# Patient Record
Sex: FEMALE | Race: WHITE | NOT HISPANIC OR LATINO
[De-identification: names, ages, dates, MRNs, and addresses within clinical notes are randomized per-mention and may not be internally consistent; named-entity substitution may affect disease eponyms.]

---

## 2020-03-21 ENCOUNTER — TRANSCRIPTION ENCOUNTER (OUTPATIENT)
Age: 60
End: 2020-03-21

## 2022-04-07 ENCOUNTER — APPOINTMENT (OUTPATIENT)
Dept: PAIN MANAGEMENT | Facility: CLINIC | Age: 62
End: 2022-04-07
Payer: COMMERCIAL

## 2022-04-07 PROCEDURE — 20552 NJX 1/MLT TRIGGER POINT 1/2: CPT | Mod: 59

## 2022-04-07 PROCEDURE — J3490M: CUSTOM

## 2022-04-07 PROCEDURE — 64635 DESTROY LUMB/SAC FACET JNT: CPT

## 2022-04-07 NOTE — PROCEDURE
[FreeTextEntry3] : Date of Service: 04/07/2022 \par \par Account: [id number]\par \par Patient: NAIDA FAM \par \par YOB: 1960\par \par Age: 61 year\par \par \par Surgeon:  Crispin Acuna M.D.\par \par PREOPERATIVE DIAGNOSIS: Spondylosis of lumbar region without myelopathy or radiculopathy\par \par POSTOPERATIVE DIAGNOSIS: Spondylosis of lumbar region without myelopathy or radiculopathy \par \par PROCEDURE:\par \par      1) Left sided L2, L3, L4, L5 medial branch radiofrequency ablation under fluoroscopic guidance.  \par \par Anesthesia:                             MAC\par \par \par Risks, benefits and alternatives of the procedure were discussed with the patient after which she agreed to proceed.  Patient was brought into fluoroscopy suite and was placed in prone position with hip support. Back was prepped and draped in a sterile fashion x3. Anesthesia initiated. \par \par Under AP visualization, the left sacral ala was identified and marked. Using a 25 gauge ½ inch needle the skin and subcutaneous structures at this point were localized with 1% Lidocaine using approximately 3 cc's 1% Lidocaine.  After this, a 20 gauge 100mm Mina radiofrequency needle with a 10mm curved tip was inserted and using depth direction depth technique under constant fluoroscopic visualization, the needle was advanced to the sacral ala until os was contacted.  \par \par The camera was then redirected under AP view to visualize the L3,L4,L5 vertebra, the camera was left oblique to approximately 30 degrees to reveal good Emile dog anatomical view. The junction of the superior articulate process and transverse process at the L3,L4,L5 levels on the left were then identified and marked. Skin and subcutaneous structures were then anesthetized with approximately 3 cc's of 1% Lidocaine at each of these levels. After which a 20 gauge 100mm Mina radiofrequency needle with a 10mm curved tip then advanced until the junction at the SAP and transverse process was met.  The camera was then directed through the lateral view and under constant fluoroscopic visualization, the needle tips were then advanced and confirmed at the junction of the SAP and transverse process.  \par \par The stylette for the most cephalad needle at the L3 level was then removed and a Liliam radiofrequency probe was then placed inside the needle. After her pedis' were checked at approximately 300 ohm, 50 hertz sensory stimulation was performed.  Patient experienced concordant pain in his left low back at approximately 0.3 volts. The voltage was then increased to 1 volt. The patient reported increased left low back pain symptoms without any radiation below the left knee.  Stimulation was then changed to 2 hertz and increased slowly by .1 volt increments at approximately 0.5 volts, patient began to experience thumping like reproductive pain in his left low back. The voltage was then increased to approximately 2.5 volts. Patient experienced increasing thumping without any sensation below his left knee. This exact stimulation was then repeated for the L4 and L5 needles as well as sacral ala needle with concordant pain and no radiation below the left knee. The 3 levels were then anesthetized with approximately 0.5 cc's of 1% Lidocaine. After which each area was then ablated at 80 degrees centigrade for 60 seconds each. Patient felt no pain reproduction during the ablation procedure.  After each of these levels were ablated and injected approximately 1 cc of 0.25% Bupivacaine plus 10 mg of depo-medrol were then injected before the needles were removed.  Pressure was then applied to the low back. Band-aids were applied.  Patient was brought to the recovery, ambulated on his own after the procedure and reported decreased left sided low back pain. \par \par Anesthesia personnel were present throughout the procedure. Patient was told to apply ice to the low back for 20 minutes on and 20 minutes off for focal symptoms for 24-48 hours. He is to call the office if he had any questions or concerns.  \par \par \par Crispin Acuna M.D.\par

## 2022-04-25 ENCOUNTER — APPOINTMENT (OUTPATIENT)
Dept: PAIN MANAGEMENT | Facility: CLINIC | Age: 62
End: 2022-04-25
Payer: COMMERCIAL

## 2022-04-25 VITALS — BODY MASS INDEX: 30.65 KG/M2 | HEIGHT: 63 IN | WEIGHT: 173 LBS

## 2022-04-25 DIAGNOSIS — G89.29 OTHER CHRONIC PAIN: ICD-10-CM

## 2022-04-25 DIAGNOSIS — Z78.9 OTHER SPECIFIED HEALTH STATUS: ICD-10-CM

## 2022-04-25 PROCEDURE — J3490M: CUSTOM

## 2022-04-25 PROCEDURE — 20552 NJX 1/MLT TRIGGER POINT 1/2: CPT

## 2022-04-25 PROCEDURE — 99214 OFFICE O/P EST MOD 30 MIN: CPT | Mod: 25

## 2022-04-25 RX ORDER — ONDANSETRON 4 MG/1
4 TABLET, ORALLY DISINTEGRATING ORAL
Qty: 15 | Refills: 0 | Status: ACTIVE | COMMUNITY
Start: 2022-04-07

## 2022-04-25 RX ORDER — LEVOTHYROXINE SODIUM 0.1 MG/1
100 TABLET ORAL
Qty: 30 | Refills: 0 | Status: ACTIVE | COMMUNITY
Start: 2022-03-11

## 2022-04-25 NOTE — HISTORY OF PRESENT ILLNESS
[Lower back] : lower back [7] : 7 [Dull/Aching] : dull/aching [Radiating] : radiating [Constant] : constant [Injection therapy] : injection therapy [Full time] : Work status: full time [] : no [de-identified] : lifting

## 2022-04-25 NOTE — PHYSICAL EXAM
[Able to Communicate] : able to communicate [Well Developed] : well developed [Well Nourished] : well nourished [No Respiratory Distress] : no respiratory distress [] : positive Dickey maneuver facet loading

## 2022-04-25 NOTE — PROCEDURE
[Trigger point 1-2 muscle groups] : trigger point 1-2 muscle groups [Left] : of the left [Lumbar paraspinal muscle] : lumbar paraspinal muscle [Pain] : pain [Inflammation] : inflammation [Alcohol] : alcohol [Ethyl Chloride sprayed topically] : ethyl chloride sprayed topically [Sterile technique used] : sterile technique used [___ cc    1%] : Lidocaine ~Vcc of 1%  [___ cc    0.25%] : Bupivacaine (Marcaine) ~Vcc of 0.25%  [___ cc    10mg] : Triamcinolone (Kenalog) ~Vcc of 10 mg

## 2022-04-25 NOTE — ASSESSMENT
[FreeTextEntry1] : L LS RFA with 80% relief at this time, will re-assess \par will do TPI today \par  \par \par Trigger Point was performed because of pain inflammation Anesthesia: ethyl chloride sprayed topically.: Lidocaine .1% 2 cc Marcaine:.25% 2cc  kenalog 10mg/cc 2cc :Needle size: 25 gauge 1 1/2inch.  Medication was injected in the left Lumbar paraspinal muscles . Patient has tried OTC's including aspirin, Ibuprofen, Aleve etc or prescription NSAIDS, and/or exercises at home and/ or physical therapy without satisfactory response After verbal consent using sterile preparation and technique.The risks, benefits, and alternatives to cortisone injection were explained in full to the patient. Risks outlined include but are not limited to infection, sepsis, bleeding, scarring, skin discoloration, temporary increase in pain, syncopal episode, failure to resolve symptoms, allergic reaction, symptom recurrence, and elevation of blood sugar in diabetics. Patient understood the risks. All questions were answered. After discussion of options, patient requested an injection. Oral informed consent was obtained and sterile prep was done of the injection site. Sterile technique was utilized for the procedure including the preparation of the solutions used for the injection. Patient tolerated the procedure well. Advised to ice the injection site this evening.  Sterile technique used Prep with alcohol locally to site.\par \par

## 2022-04-25 NOTE — REASON FOR VISIT
[FreeTextEntry2] : f/u to injection \par Procedure: Left sided L2,L3,L4,L5 medical branch radiofrequency ablation under fluoroscopic guidance \par Anethesia: MAC ( DOS: 04/07/2022 )

## 2022-05-04 ENCOUNTER — RX RENEWAL (OUTPATIENT)
Age: 62
End: 2022-05-04

## 2022-06-06 ENCOUNTER — APPOINTMENT (OUTPATIENT)
Dept: PAIN MANAGEMENT | Facility: CLINIC | Age: 62
End: 2022-06-06

## 2022-06-08 ENCOUNTER — RX RENEWAL (OUTPATIENT)
Age: 62
End: 2022-06-08

## 2022-06-08 RX ORDER — DICLOFENAC SODIUM 50 MG/1
50 TABLET, DELAYED RELEASE ORAL
Qty: 60 | Refills: 0 | Status: ACTIVE | COMMUNITY
Start: 2022-05-04 | End: 1900-01-01

## 2022-07-25 ENCOUNTER — APPOINTMENT (OUTPATIENT)
Dept: PAIN MANAGEMENT | Facility: CLINIC | Age: 62
End: 2022-07-25

## 2022-07-26 ENCOUNTER — APPOINTMENT (OUTPATIENT)
Dept: ORTHOPEDIC SURGERY | Facility: CLINIC | Age: 62
End: 2022-07-26
Payer: COMMERCIAL

## 2022-07-26 VITALS — HEIGHT: 63 IN | BODY MASS INDEX: 29.23 KG/M2 | WEIGHT: 165 LBS

## 2022-07-26 DIAGNOSIS — S90.31XA CONTUSION OF RIGHT FOOT, INITIAL ENCOUNTER: ICD-10-CM

## 2022-07-26 DIAGNOSIS — S82.62XA DISPLACED FRACTURE OF LATERAL MALLEOLUS OF LEFT FIBULA, INITIAL ENCOUNTER FOR CLOSED FRACTURE: ICD-10-CM

## 2022-07-26 PROCEDURE — 73660 X-RAY EXAM OF TOE(S): CPT | Mod: RT

## 2022-07-26 PROCEDURE — 99214 OFFICE O/P EST MOD 30 MIN: CPT | Mod: 25

## 2022-07-26 PROCEDURE — L4361: CPT

## 2022-07-26 PROCEDURE — 99072 ADDL SUPL MATRL&STAF TM PHE: CPT

## 2022-07-26 PROCEDURE — 73610 X-RAY EXAM OF ANKLE: CPT | Mod: LT

## 2022-07-26 PROCEDURE — 73590 X-RAY EXAM OF LOWER LEG: CPT | Mod: LT

## 2022-07-26 PROCEDURE — 27786 TREATMENT OF ANKLE FRACTURE: CPT | Mod: LT

## 2022-07-26 NOTE — ASSESSMENT
[FreeTextEntry1] : wbat\par cam boot LLE\par ice/elevate\par nsaids prn\par f/up 3 wks w/ L ankle xray\par \par f/up elbow specialist for elbow injury

## 2022-07-26 NOTE — HISTORY OF PRESENT ILLNESS
[7] : 7 [Sharp] : sharp [de-identified] : 07/26/2022: fell on stairs 2 days ago w/ L ankle pain. went to ed and placed in splint. no prior ankle probs. also c/o R toe pain after running over w/ wheelchair. denies dm/tob.  [] : no [FreeTextEntry1] : Lt ankle  [FreeTextEntry3] : 7/24/22

## 2022-07-26 NOTE — PHYSICAL EXAM
[Mild] : mild swelling of toe(s) [5th] : 5th [5___] : eversion 5[unfilled]/5 [2+] : dorsalis pedis pulse: 2+ [] : Sensation present to light touch in all distributions [Left] : left tibia/fibula [Right] : right toe [There are no fractures, subluxations or dislocations. No significant abnormalities are seen] : There are no fractures, subluxations or dislocations. No significant abnormalities are seen [FreeTextEntry9] : lat mall avulsion fx [de-identified] : lat mall avulsion fx [de-identified] : plantar flexion 20 degrees [TWNoteComboBox7] : dorsiflexion 5 degrees

## 2022-07-26 NOTE — DATA REVIEWED
[CT Scan] : CT scan [Left] : left [Ankle] : ankle [FreeTextEntry1] : lat mall avulsion fx - [facility]--no images available

## 2022-08-01 ENCOUNTER — APPOINTMENT (OUTPATIENT)
Dept: PAIN MANAGEMENT | Facility: CLINIC | Age: 62
End: 2022-08-01
Payer: COMMERCIAL

## 2022-08-01 VITALS — WEIGHT: 165 LBS | HEIGHT: 63 IN | BODY MASS INDEX: 29.23 KG/M2

## 2022-08-01 PROCEDURE — 99072 ADDL SUPL MATRL&STAF TM PHE: CPT

## 2022-08-01 PROCEDURE — J3490M: CUSTOM

## 2022-08-01 PROCEDURE — 20552 NJX 1/MLT TRIGGER POINT 1/2: CPT

## 2022-08-01 PROCEDURE — 99214 OFFICE O/P EST MOD 30 MIN: CPT | Mod: 25

## 2022-08-01 NOTE — ASSESSMENT
[FreeTextEntry1] : L LS RFA sustained relief \par will do TPI today \par  \par \par Trigger Point was performed because of pain inflammation Anesthesia: ethyl chloride sprayed topically.: Lidocaine .1% 2 cc Marcaine:.25% 2cc  kenalog 10mg/cc 2cc :Needle size: 25 gauge 1 1/2inch.  Medication was injected in the left Lumbar paraspinal muscles . Patient has tried OTC's including aspirin, Ibuprofen, Aleve etc or prescription NSAIDS, and/or exercises at home and/ or physical therapy without satisfactory response After verbal consent using sterile preparation and technique.The risks, benefits, and alternatives to cortisone injection were explained in full to the patient. Risks outlined include but are not limited to infection, sepsis, bleeding, scarring, skin discoloration, temporary increase in pain, syncopal episode, failure to resolve symptoms, allergic reaction, symptom recurrence, and elevation of blood sugar in diabetics. Patient understood the risks. All questions were answered. After discussion of options, patient requested an injection. Oral informed consent was obtained and sterile prep was done of the injection site. Sterile technique was utilized for the procedure including the preparation of the solutions used for the injection. Patient tolerated the procedure well. Advised to ice the injection site this evening.  Sterile technique used Prep with alcohol locally to site.\par \par

## 2022-08-01 NOTE — HISTORY OF PRESENT ILLNESS
[Sharp] : sharp [Shooting] : shooting [Stabbing] : stabbing [Household chores] : household chores [Leisure] : leisure [Sleep] : sleep [Meds] : meds [Sitting] : sitting [Nothing helps with pain getting better] : Nothing helps with pain getting better [Standing] : standing [Walking] : walking [Stairs] : stairs [Lower back] : lower back [7] : 7 [Dull/Aching] : dull/aching [Radiating] : radiating [Constant] : constant [Injection therapy] : injection therapy [Full time] : Work status: full time [] : no [FreeTextEntry7] : Left side [de-identified] : lifting

## 2022-08-08 ENCOUNTER — FORM ENCOUNTER (OUTPATIENT)
Age: 62
End: 2022-08-08

## 2022-08-09 ENCOUNTER — APPOINTMENT (OUTPATIENT)
Dept: ORTHOPEDIC SURGERY | Facility: CLINIC | Age: 62
End: 2022-08-09

## 2022-08-09 ENCOUNTER — APPOINTMENT (OUTPATIENT)
Dept: MRI IMAGING | Facility: CLINIC | Age: 62
End: 2022-08-09

## 2022-08-09 VITALS — WEIGHT: 165 LBS | BODY MASS INDEX: 29.23 KG/M2 | HEIGHT: 63 IN

## 2022-08-09 DIAGNOSIS — M25.421 EFFUSION, RIGHT ELBOW: ICD-10-CM

## 2022-08-09 DIAGNOSIS — S50.01XA CONTUSION OF RIGHT ELBOW, INITIAL ENCOUNTER: ICD-10-CM

## 2022-08-09 PROCEDURE — 99072 ADDL SUPL MATRL&STAF TM PHE: CPT

## 2022-08-09 PROCEDURE — 73090 X-RAY EXAM OF FOREARM: CPT | Mod: RT

## 2022-08-09 PROCEDURE — 99244 OFF/OP CNSLTJ NEW/EST MOD 40: CPT | Mod: 24

## 2022-08-09 PROCEDURE — 73221 MRI JOINT UPR EXTREM W/O DYE: CPT | Mod: RT

## 2022-08-09 PROCEDURE — 73080 X-RAY EXAM OF ELBOW: CPT | Mod: RT

## 2022-08-09 NOTE — ASSESSMENT
[FreeTextEntry1] : Right elbow effusion, concern for fx or tear.\par MRI to eval.\par Ice/heat.\par RTO for review.

## 2022-08-09 NOTE — IMAGING
[Right] : right forearm [No acute displaced fracture or dislocation] : No acute displaced fracture or dislocation

## 2022-08-09 NOTE — HISTORY OF PRESENT ILLNESS
[8] : 8 [3] : 3 [Radiating] : radiating [Tingling] : tingling [Occasional] : occasional [Household chores] : household chores [Sleep] : sleep [Nothing helps with pain getting better] : Nothing helps with pain getting better [de-identified] : 61 yo RHD Female here as consult from [place] Right elbow/ forearm pain since 7/24/22, when she fell onto elbow after missing a step. Did not pop out. Was seen [facility] ED - took elbow CT which showed osseus fragment adjacent to right cornoid process. Pain has not improved since injury. Notes limited ROM and pain with lifting/pulling/ pushing. Denies N/T.  [] : no [FreeTextEntry1] : right elbow  [FreeTextEntry3] : 7/21/22 [FreeTextEntry5] : patient was injured when she fell outside in her sons backyard. She landed on her elbow  [FreeTextEntry7] : down the forearm  [de-identified] : ct

## 2022-08-15 ENCOUNTER — APPOINTMENT (OUTPATIENT)
Dept: ORTHOPEDIC SURGERY | Facility: CLINIC | Age: 62
End: 2022-08-15

## 2022-08-15 DIAGNOSIS — S52.124D NONDISPLACED FRACTURE OF HEAD OF RIGHT RADIUS, SUBSEQUENT ENCOUNTER FOR CLOSED FRACTURE WITH ROUTINE HEALING: ICD-10-CM

## 2022-08-15 PROCEDURE — 73080 X-RAY EXAM OF ELBOW: CPT | Mod: RT

## 2022-08-15 PROCEDURE — 24650 CLTX RDL HEAD/NCK FX WO MNPJ: CPT | Mod: 79,RT

## 2022-08-15 PROCEDURE — 99072 ADDL SUPL MATRL&STAF TM PHE: CPT

## 2022-08-15 PROCEDURE — 99214 OFFICE O/P EST MOD 30 MIN: CPT | Mod: 24,57

## 2022-08-15 NOTE — ASSESSMENT
[FreeTextEntry1] : MRI reviewed- non displaced radial head fracture.\par Now 2 weeks.\par D/c sling.\par Pt for PROM, AROM.\par Ok for light ADLs.\par RTO 4 weeks with xrays.

## 2022-08-15 NOTE — HISTORY OF PRESENT ILLNESS
[8] : 8 [3] : 3 [Radiating] : radiating [Tingling] : tingling [Occasional] : occasional [Household chores] : household chores [Sleep] : sleep [Nothing helps with pain getting better] : Nothing helps with pain getting better [de-identified] : 8/15/22- F/U R Elbow, presents for MRI results. \par \par MRI R elbow: \par 1. Nondisplaced fracture of the radial head with fracture lines extending into the neck and proximal diaphysis with moderate bone edema. The radial articular surface appears intact.\par 2. Mild bone contusion of the capitellum.\par 3. Mild strain of the common flexor tendon.\par 4. Mild sprain of the ulnar collateral ligament.\par 5. Moderate-sized joint effusion.\par \par 8/9/22- 61 yo RHD Female here as consult from [place] Right elbow/ forearm pain since 7/24/22, when she fell onto elbow after missing a step. Did not pop out. Was seen [facility] ED - took elbow CT which showed osseus fragment adjacent to right cornoid process. Pain has not improved since injury. Notes limited ROM and pain with lifting/pulling/pushing. Denies N/T.  [] : no [FreeTextEntry1] : right elbow  [FreeTextEntry3] : 7/21/22 [FreeTextEntry5] : patient was injured when she fell outside in her sons backyard. She landed on her elbow  [FreeTextEntry7] : down the forearm  [de-identified] : ct

## 2022-08-15 NOTE — IMAGING
[Right] : right forearm [The fracture is in acceptable alignment. There is progression in healing seen] : The fracture is in acceptable alignment. There is progression in healing seen

## 2022-08-15 NOTE — PHYSICAL EXAM
[Right] : right elbow [Pain with Extension] : pain with extension [] : no tenderness over olecranon [FreeTextEntry9] : Extension 10 deg [de-identified] : Pain with strength testing  [TWNoteComboBox7] : flexion 110 degrees

## 2022-08-16 ENCOUNTER — APPOINTMENT (OUTPATIENT)
Dept: ORTHOPEDIC SURGERY | Facility: CLINIC | Age: 62
End: 2022-08-16

## 2022-08-16 VITALS — HEIGHT: 63 IN | BODY MASS INDEX: 29.23 KG/M2 | WEIGHT: 165 LBS

## 2022-08-16 PROCEDURE — 73610 X-RAY EXAM OF ANKLE: CPT | Mod: LT

## 2022-08-16 PROCEDURE — L4350: CPT

## 2022-08-16 PROCEDURE — 99024 POSTOP FOLLOW-UP VISIT: CPT

## 2022-08-16 NOTE — ASSESSMENT
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

## 2022-08-16 NOTE — HISTORY OF PRESENT ILLNESS
[3] : 3 [Dull/Aching] : dull/aching [de-identified] : 07/26/2022: fell on stairs 2 days ago w/ L ankle pain. went to ed and placed in splint. no prior ankle probs. also c/o R toe pain after running over w/ wheelchair. denies dm/tob. \par \par 08/16/2022: Pain improving. WB in boot.  [] : no [FreeTextEntry1] : Lt ankle  [FreeTextEntry3] : 7/24/22 [de-identified] : boot  [de-identified] : Arina

## 2022-08-16 NOTE — IMAGING
[Left] : left ankle [The fracture is in acceptable alignment. There is progression in healing seen] : The fracture is in acceptable alignment. There is progression in healing seen

## 2022-08-16 NOTE — PHYSICAL EXAM
[Mild] : mild swelling of toe(s) [5th] : 5th [2+] : dorsalis pedis pulse: 2+ [Left] : left tibia/fibula [Right] : right toe [There are no fractures, subluxations or dislocations. No significant abnormalities are seen] : There are no fractures, subluxations or dislocations. No significant abnormalities are seen [5___] : plantar flexion 5[unfilled]/5 [] : negative anterior drawer at ankle [FreeTextEntry8] : improved [FreeTextEntry9] : lat mall avulsion fx [de-identified] : lat mall avulsion fx [de-identified] : plantar flexion 30 degrees [TWNoteComboBox7] : dorsiflexion 10 degrees

## 2022-08-29 ENCOUNTER — APPOINTMENT (OUTPATIENT)
Dept: PAIN MANAGEMENT | Facility: CLINIC | Age: 62
End: 2022-08-29
Payer: COMMERCIAL

## 2022-08-29 VITALS — HEIGHT: 63 IN | BODY MASS INDEX: 29.23 KG/M2 | WEIGHT: 165 LBS

## 2022-08-29 PROCEDURE — 20552 NJX 1/MLT TRIGGER POINT 1/2: CPT

## 2022-08-29 PROCEDURE — J3490M: CUSTOM

## 2022-08-29 PROCEDURE — 99072 ADDL SUPL MATRL&STAF TM PHE: CPT

## 2022-08-29 PROCEDURE — 99214 OFFICE O/P EST MOD 30 MIN: CPT | Mod: 25

## 2022-08-29 NOTE — HISTORY OF PRESENT ILLNESS
[Bending forward] : bending forward [Lower back] : lower back [7] : 7 [Dull/Aching] : dull/aching [Radiating] : radiating [Sharp] : sharp [Shooting] : shooting [Stabbing] : stabbing [Constant] : constant [Household chores] : household chores [Leisure] : leisure [Sleep] : sleep [Meds] : meds [Injection therapy] : injection therapy [Nothing helps with pain getting better] : Nothing helps with pain getting better [Sitting] : sitting [Standing] : standing [Walking] : walking [Stairs] : stairs [Full time] : Work status: full time [] : no [FreeTextEntry7] : Left side [de-identified] : lifting

## 2022-08-29 NOTE — ASSESSMENT
[FreeTextEntry1] : L LS RFA  WITH 75-80% RELIEF PP IMPROVED ROM AND ADLS, WILL REPEAT \par will do TPI today \par  \par \par Trigger Point was performed because of pain inflammation Anesthesia: ethyl chloride sprayed topically.: Lidocaine .1% 2 cc Marcaine:.25% 2cc  kenalog 10mg/cc 2cc :Needle size: 25 gauge 1 1/2inch.  Medication was injected in the left Lumbar paraspinal muscles . Patient has tried OTC's including aspirin, Ibuprofen, Aleve etc or prescription NSAIDS, and/or exercises at home and/ or physical therapy without satisfactory response After verbal consent using sterile preparation and technique.The risks, benefits, and alternatives to cortisone injection were explained in full to the patient. Risks outlined include but are not limited to infection, sepsis, bleeding, scarring, skin discoloration, temporary increase in pain, syncopal episode, failure to resolve symptoms, allergic reaction, symptom recurrence, and elevation of blood sugar in diabetics. Patient understood the risks. All questions were answered. After discussion of options, patient requested an injection. Oral informed consent was obtained and sterile prep was done of the injection site. Sterile technique was utilized for the procedure including the preparation of the solutions used for the injection. Patient tolerated the procedure well. Advised to ice the injection site this evening.  Sterile technique used Prep with alcohol locally to site.\par \par

## 2022-09-20 ENCOUNTER — APPOINTMENT (OUTPATIENT)
Dept: ORTHOPEDIC SURGERY | Facility: CLINIC | Age: 62
End: 2022-09-20

## 2022-09-20 VITALS — WEIGHT: 165 LBS | BODY MASS INDEX: 29.23 KG/M2 | HEIGHT: 63 IN

## 2022-09-20 DIAGNOSIS — S82.832D OTHER FRACTURE OF UPPER AND LOWER END OF LEFT FIBULA, SUBSEQUENT ENCOUNTER FOR CLOSED FRACTURE WITH ROUTINE HEALING: ICD-10-CM

## 2022-09-20 PROCEDURE — 73610 X-RAY EXAM OF ANKLE: CPT | Mod: LT

## 2022-09-20 PROCEDURE — 99024 POSTOP FOLLOW-UP VISIT: CPT

## 2022-09-20 NOTE — PHYSICAL EXAM
[5___] : [facility] 5[unfilled]/5 [2+] : dorsalis pedis pulse: 2+ [5th] : 5th [Left] : left ankle [] : no lisfranc's joint tenderness [The fracture is in acceptable alignment. There is progression in healing seen] : The fracture is in acceptable alignment. There is progression in healing seen [FreeTextEntry8] : improved [de-identified] : plantar flexion 30 degrees [TWNoteComboBox7] : dorsiflexion 10 degrees

## 2022-09-20 NOTE — HISTORY OF PRESENT ILLNESS
[3] : 3 [Dull/Aching] : dull/aching [de-identified] : 07/26/2022: fell on stairs 2 days ago w/ L ankle pain. went to ed and placed in splint. no prior ankle probs. also c/o R toe pain after running over w/ wheelchair. denies dm/tob. \par \par 08/16/2022: Pain improving. WB in boot. \par \par 09/20/2022: pain improving. walking in regular shoes w/ brace. stopped PT due to cost - doing HEP. using brace [] : Post Surgical Visit: no [FreeTextEntry1] : Lt ankle  [FreeTextEntry3] : 7/24/22 [de-identified] : boot  [de-identified] : [facility]  [de-identified] : xray

## 2022-09-27 ENCOUNTER — APPOINTMENT (OUTPATIENT)
Dept: ORTHOPEDIC SURGERY | Facility: CLINIC | Age: 62
End: 2022-09-27

## 2022-10-12 ENCOUNTER — APPOINTMENT (OUTPATIENT)
Dept: PAIN MANAGEMENT | Facility: CLINIC | Age: 62
End: 2022-10-12

## 2022-10-12 PROCEDURE — 64636Z: CUSTOM | Mod: 50

## 2022-10-12 PROCEDURE — J3490M: CUSTOM

## 2022-10-12 PROCEDURE — 99072 ADDL SUPL MATRL&STAF TM PHE: CPT

## 2022-10-12 PROCEDURE — 64635 DESTROY LUMB/SAC FACET JNT: CPT | Mod: LT

## 2022-10-17 NOTE — PROCEDURE
[FreeTextEntry3] : Date of Service: 10/12/2022 \par \par Account: [id number]\par \par Patient: NAIDA FAM \par \par YOB: 1960\par \par Age: 62 year\par \par \par Surgeon:  Crispin Acuna M.D.\par \par PREOPERATIVE DIAGNOSIS: Spondylosis of lumbar region without myelopathy or radiculopathy\par \par POSTOPERATIVE DIAGNOSIS: Spondylosis of lumbar region without myelopathy or radiculopathy \par \par PROCEDURE:\par \par      1) Left sided L2, L3, L4, L5 medial branch radiofrequency ablation under fluoroscopic guidance.  \par \par Anesthesia:                             MAC\par \par \par Risks, benefits and alternatives of the procedure were discussed with the patient after which she agreed to proceed.  Patient was brought into fluoroscopy suite and was placed in prone position with hip support. Back was prepped and draped in a sterile fashion x3. Anesthesia initiated. \par \par Under AP visualization, the left sacral ala was identified and marked. Using a 25 gauge ½ inch needle the skin and subcutaneous structures at this point were localized with 1% Lidocaine using approximately 3 cc's 1% Lidocaine.  After this, a 20 gauge 100mm [place] radiofrequency needle with a 10mm curved tip was inserted and using depth direction depth technique under constant fluoroscopic visualization, the needle was advanced to the sacral ala until os was contacted.  \par \par The camera was then redirected under AP view to visualize the L3,L4,L5 vertebra, the camera was left oblique to approximately 30 degrees to reveal good Emile dog anatomical view. The junction of the superior articulate process and transverse process at the L3,L4,L5 levels on the left were then identified and marked. Skin and subcutaneous structures were then anesthetized with approximately 3 cc's of 1% Lidocaine at each of these levels. After which a 20 gauge 100mm [place] radiofrequency needle with a 10mm curved tip then advanced until the junction at the SAP and transverse process was met.  The camera was then directed through the lateral view and under constant fluoroscopic visualization, the needle tips were then advanced and confirmed at the junction of the SAP and transverse process.  \par \par The stylette for the most cephalad needle at the L3 level was then removed and a Liliam radiofrequency probe was then placed inside the needle. After her pedis' were checked at approximately 300 ohm, 50 hertz sensory stimulation was performed.  Patient experienced concordant pain in his left low back at approximately 0.3 volts. The voltage was then increased to 1 volt. The patient reported increased left low back pain symptoms without any radiation below the left knee.  Stimulation was then changed to 2 hertz and increased slowly by .1 volt increments at approximately 0.5 volts, patient began to experience thumping like reproductive pain in his left low back. The voltage was then increased to approximately 2.5 volts. Patient experienced increasing thumping without any sensation below his left knee. This exact stimulation was then repeated for the L4 and L5 needles as well as sacral ala needle with concordant pain and no radiation below the left knee. The 3 levels were then anesthetized with approximately 0.5 cc's of 1% Lidocaine. After which each area was then ablated at 80 degrees centigrade for 60 seconds each. Patient felt no pain reproduction during the ablation procedure.  After each of these levels were ablated and injected approximately 1 cc of 0.25% Bupivacaine plus 10 mg of depo-medrol were then injected before the needles were removed.  Pressure was then applied to the low back. Band-aids were applied.  Patient was brought to the recovery, ambulated on his own after the procedure and reported decreased left sided low back pain. \par \par Anesthesia personnel were present throughout the procedure. Patient was told to apply ice to the low back for 20 minutes on and 20 minutes off for focal symptoms for 24-48 hours. He is to call the office if he had any questions or concerns.  \par \par \par Crispin Acuna M.D.\par

## 2022-11-07 ENCOUNTER — APPOINTMENT (OUTPATIENT)
Dept: PAIN MANAGEMENT | Facility: CLINIC | Age: 62
End: 2022-11-07

## 2022-11-07 VITALS — WEIGHT: 165 LBS | HEIGHT: 63 IN | BODY MASS INDEX: 29.23 KG/M2

## 2022-11-07 DIAGNOSIS — M79.10 MYALGIA, UNSPECIFIED SITE: ICD-10-CM

## 2022-11-07 PROCEDURE — 99072 ADDL SUPL MATRL&STAF TM PHE: CPT

## 2022-11-07 PROCEDURE — 99214 OFFICE O/P EST MOD 30 MIN: CPT | Mod: 25

## 2022-11-07 NOTE — ASSESSMENT
[FreeTextEntry1] : L LS RFA with >90% relief x 3 weeks, pain has returned with activity\par will do TPI today \par consider repeat MBB

## 2022-11-07 NOTE — PROCEDURE
[Trigger point 1-2 muscle groups] : trigger point 1-2 muscle groups [Left] : of the left [Lumbar paraspinal muscle] : lumbar paraspinal muscle [Pain] : pain [Inflammation] : inflammation [Alcohol] : alcohol [___ cc    1%] : Lidocaine ~Vcc of 1%  [___ cc    0.25%] : Bupivacaine (Marcaine) ~Vcc of 0.25%  [___ cc    10mg] : Triamcinolone (Kenalog) ~Vcc of 10 mg  [] : Patient tolerated procedure well [Call if redness, pain or fever occur] : call if redness, pain or fever occur [Risks, benefits, alternatives discussed / Verbal consent obtained] : the risks benefits, and alternatives have been discussed, and verbal consent was obtained

## 2022-11-07 NOTE — REASON FOR VISIT
[FreeTextEntry2] : f/u to injection \par Procedure: Left sided L2,L3,L4,L4 medial branch radiofrequency ablation under fluoroscopic guidance\par Anesthesia:MAC(DOS:10/12/2022)

## 2022-11-07 NOTE — HISTORY OF PRESENT ILLNESS
[Lower back] : lower back [7] : 7 [3] : 3 [Radiating] : radiating [] : yes [Constant] : constant [Household chores] : household chores [Rest] : rest [Standing] : standing [de-identified] : pt is following up after l spine rfa

## 2023-01-09 ENCOUNTER — APPOINTMENT (OUTPATIENT)
Dept: PAIN MANAGEMENT | Facility: CLINIC | Age: 63
End: 2023-01-09
Payer: COMMERCIAL

## 2023-01-09 VITALS — HEIGHT: 63 IN | WEIGHT: 165 LBS | BODY MASS INDEX: 29.23 KG/M2

## 2023-01-09 PROCEDURE — 99213 OFFICE O/P EST LOW 20 MIN: CPT

## 2023-01-09 PROCEDURE — 99072 ADDL SUPL MATRL&STAF TM PHE: CPT

## 2023-01-09 NOTE — DISCUSSION/SUMMARY
[de-identified] : doing better this month 85% relief with improved rom and adls\par still with some pain with standing in place and sleeping\par \par will consider repeat MBB prn and eligible for RFA after 4/12\par \par will hold off injections today

## 2023-01-09 NOTE — HISTORY OF PRESENT ILLNESS
[Lower back] : lower back [3] : 3 [Dull/Aching] : dull/aching [] : yes [Intermittent] : intermittent [Household chores] : household chores [Leisure] : leisure [Sleep] : sleep [Meds] : meds [Standing] : standing [Walking] : walking [FreeTextEntry7] : b/l legs

## 2023-01-09 NOTE — PHYSICAL EXAM
[de-identified] : PHYSICAL EXAM\par \par Constitutional: \par Appears well, no apparent distress\par Ability to communicate: Normal\par Respiratory: non-labored breathing\par Skin: no rash noted\par Head: normocephalic, atraumatic\par Neck: no visible thyroid enlargement\par Eyes: extraocular movements intact\par Neurologic: alert and oriented x3\par Psychiatric: normal mood, affect, and behavior\par \par Lumbar Spine: \par Palpation: left and right lumbar paraspinal spasm and left and right lumbar paraspinal tenderness to palpation.\par ROM: Diminished range of motion in all plains.  Patient notes pain with lateral bending to the left and right.\par MMT: Motor exam is 5/5 through out bilateral lower extremities.\par Sensation: Light touch and pain is intact throughout bilateral lower extremities.\par Reflexes: achilles and patella reflexes are intact and  symmetrical.  No sustained clonus.\par Special Testing: Positive kemps maneuver on the left and right side\par \par Assessemnt:\par Lumbar spondylosis (m47.816)\par Myalgia (M79.10)\par \par

## 2023-02-13 ENCOUNTER — APPOINTMENT (OUTPATIENT)
Dept: PAIN MANAGEMENT | Facility: CLINIC | Age: 63
End: 2023-02-13
Payer: COMMERCIAL

## 2023-02-13 VITALS — WEIGHT: 165 LBS | HEIGHT: 63 IN | BODY MASS INDEX: 29.23 KG/M2

## 2023-02-13 PROCEDURE — 99214 OFFICE O/P EST MOD 30 MIN: CPT | Mod: 25

## 2023-02-13 PROCEDURE — 20552 NJX 1/MLT TRIGGER POINT 1/2: CPT

## 2023-02-13 PROCEDURE — J3490M: CUSTOM

## 2023-02-13 PROCEDURE — 99072 ADDL SUPL MATRL&STAF TM PHE: CPT

## 2023-02-13 NOTE — HISTORY OF PRESENT ILLNESS
[Lower back] : lower back [7] : 7 [4] : 4 [Dull/Aching] : dull/aching [Radiating] : radiating [Constant] : constant [Household chores] : household chores [Leisure] : leisure [Social interactions] : social interactions [Injection therapy] : injection therapy [de-identified] : pt is following up for l spine pain , the pain goes into the left buttocks  [] : no [FreeTextEntry7] : left buttocks  [de-identified] : lifting

## 2023-02-13 NOTE — ASSESSMENT
[FreeTextEntry1] : L LS RFA 10/12/22 with >70% relief sustained until few weeks ago\par unable to repeat RFA until after 4/12, will do TPI today

## 2023-02-13 NOTE — PHYSICAL EXAM
[de-identified] : PHYSICAL EXAM\par \par Constitutional: \par Appears well, no apparent distress\par Ability to communicate: Normal\par Respiratory: non-labored breathing\par Skin: no rash noted\par Head: normocephalic, atraumatic\par Neck: no visible thyroid enlargement\par Eyes: extraocular movements intact\par Neurologic: alert and oriented x3\par Psychiatric: normal mood, affect, and behavior\par \par Lumbar Spine: \par Palpation: left and right lumbar paraspinal spasm and left and right lumbar paraspinal tenderness to palpation.\par ROM: Diminished range of motion in all plains.  Patient notes pain with lateral bending to the left and right.\par MMT: Motor exam is 5/5 through out bilateral lower extremities.\par Sensation: Light touch and pain is intact throughout bilateral lower extremities.\par Reflexes: achilles and patella reflexes are intact and  symmetrical.  No sustained clonus.\par Special Testing: Positive kemps maneuver on the left and right side\par \par Assessemnt:\par Lumbar spondylosis (m47.816)\par Myalgia (M79.10)\par \par

## 2023-02-13 NOTE — PROCEDURE
[Trigger point 1-2 muscle groups] : trigger point 1-2 muscle groups [Left] : of the left [Lumbar paraspinal muscle] : lumbar paraspinal muscle [Pain] : pain [Inflammation] : inflammation [Alcohol] : alcohol [Ethyl Chloride sprayed topically] : ethyl chloride sprayed topically [Sterile technique used] : sterile technique used [___ cc    1%] : Lidocaine ~Vcc of 1%  [___ cc    0.25%] : Bupivacaine (Marcaine) ~Vcc of 0.25%  [___ cc    10mg] : Triamcinolone (Kenalog) ~Vcc of 10 mg  [] : Patient tolerated procedure well [Call if redness, pain or fever occur] : call if redness, pain or fever occur [Risks, benefits, alternatives discussed / Verbal consent obtained] : the risks benefits, and alternatives have been discussed, and verbal consent was obtained

## 2023-03-06 ENCOUNTER — APPOINTMENT (OUTPATIENT)
Dept: ORTHOPEDIC SURGERY | Facility: CLINIC | Age: 63
End: 2023-03-06
Payer: COMMERCIAL

## 2023-03-06 ENCOUNTER — NON-APPOINTMENT (OUTPATIENT)
Age: 63
End: 2023-03-06

## 2023-03-06 VITALS — WEIGHT: 165 LBS | HEIGHT: 63 IN | BODY MASS INDEX: 29.23 KG/M2

## 2023-03-06 DIAGNOSIS — S43.422A SPRAIN OF LEFT ROTATOR CUFF CAPSULE, INITIAL ENCOUNTER: ICD-10-CM

## 2023-03-06 PROCEDURE — 99072 ADDL SUPL MATRL&STAF TM PHE: CPT

## 2023-03-06 PROCEDURE — L1833: CPT | Mod: RT

## 2023-03-06 PROCEDURE — 99213 OFFICE O/P EST LOW 20 MIN: CPT

## 2023-03-06 NOTE — HISTORY OF PRESENT ILLNESS
[Sudden] : sudden [9] : 9 [8] : 8 [Localized] : localized [Rest] : rest [Walking] : walking [] : yes [de-identified] : 03/06/2023 pt presents here today with left shoulder/right knee pain since friday 03/03/2023 after tripped and fell ,pt went to [facility] md next day \par h/o left shoulder rct repair  [FreeTextEntry1] : right knee,left shoulder  [FreeTextEntry5] : fell 03/03/2023 [de-identified] : with activity  [de-identified] : x rays done at [facility] [de-identified] : brace

## 2023-03-06 NOTE — ASSESSMENT
[FreeTextEntry1] : TRAUMATIC LEFT SHOULDER AND RIGHT KNEE PAIN AFTER FALL 3 DAYS AGO\par H/O LEFT SHOULDER RCR 2018, VERY GUARDED ON EXAM WITH DIFFUSE PAIN\par RIGHT KNEE WITH MECHANICAL SYMPTOMS AND GUARDING ON EXAM\par OUTSIDE XRAYS NEGATIVE\par MRI LEFT SHOULDER AND RIGHT KNEE TO FURTHER EVAL, R/O RCT/OCCULT FX AND MMT\par C/W SLING, PLAYMAKER BRACE RIGHT KNEE\par FOLLOW UP WITH DR. ARANA AFTER SHOULDER MRI\par RTO P KNEE MRI TO REVIEW\par NSAIDS, ICE PRN

## 2023-03-06 NOTE — IMAGING
[de-identified] : LEFT SHOULDER\par No swelling, no ecchymosis, no deformity, no scapular winging.\par Diffuse TTP\par Very guarded, limiting exam, able to passively elevate to 90 degrees with pain\par positive Richardson test, positive impingement sign, negative O’Nick test.\par Speed’s and yergason negative\par Motor and sensory intact distally\par \par RIGHT KNEE\par Mild effusion, no warmth, no ecchymosis, no erythema. Small superficial abrasion over anterior knee\par medial joint and anterior ttp\par Range of motion 3-110 with pain\par 5/5 quadriceps and hamstring strength\par Negative Lachman, positive Trav, negative anterior drawer, negative posterior drawer, negative patella apprehension; no extensor lag: no varus or valgus instability.\par Motor and sensory intact distally\par Negative Ameena\par antalgic gait\par  [Left] : left shoulder [Right] : right knee [All Views] : anteroposterior, lateral, skyline, and anteroposterior standing [Outside films reviewed] : Outside films reviewed [There are no fractures, subluxations or dislocations. No significant abnormalities are seen] : There are no fractures, subluxations or dislocations. No significant abnormalities are seen

## 2023-03-12 ENCOUNTER — FORM ENCOUNTER (OUTPATIENT)
Age: 63
End: 2023-03-12

## 2023-03-13 ENCOUNTER — APPOINTMENT (OUTPATIENT)
Dept: MRI IMAGING | Facility: CLINIC | Age: 63
End: 2023-03-13
Payer: COMMERCIAL

## 2023-03-13 PROCEDURE — 99072 ADDL SUPL MATRL&STAF TM PHE: CPT

## 2023-03-13 PROCEDURE — 73721 MRI JNT OF LWR EXTRE W/O DYE: CPT | Mod: RT

## 2023-03-13 PROCEDURE — 73221 MRI JOINT UPR EXTREM W/O DYE: CPT | Mod: LT

## 2023-03-16 ENCOUNTER — APPOINTMENT (OUTPATIENT)
Dept: ORTHOPEDIC SURGERY | Facility: CLINIC | Age: 63
End: 2023-03-16
Payer: COMMERCIAL

## 2023-03-16 VITALS — WEIGHT: 165 LBS | BODY MASS INDEX: 29.23 KG/M2 | HEIGHT: 63 IN

## 2023-03-16 PROCEDURE — 99072 ADDL SUPL MATRL&STAF TM PHE: CPT

## 2023-03-16 PROCEDURE — 99214 OFFICE O/P EST MOD 30 MIN: CPT

## 2023-03-16 NOTE — ASSESSMENT
[FreeTextEntry1] : REVIEWED SHOULDER MRI IN DETAIL, POSTERIOR MUSCLE STRAIN WITH ASSOCIATED POSTERIOR HH EDEMA; NO RE-TEAR NOTED; ADVISED SHE WEAN OFF SLING; SEEING TICKER TOMORROW FOR FURTHER CARE.\par tHE KNEE MRI ALSO REVEALS PRIMARILY A CONTUSION, NO TEAR NOTED; WEAN OFF KNEE BRACE AND START PT. F/U 4 WEEKS

## 2023-03-16 NOTE — IMAGING
[de-identified] : RIGHT KNEE\par Mild effusion, Iimproving\par Range of motion 3-110 with pain\par 5/5 quadriceps and hamstring strength\par Negative Lachman, positive Trav, negative anterior drawer, negative posterior drawer, negative patella apprehension; no extensor lag: no varus or valgus instability.\par Motor and sensory intact distally\par Negative Ameena\par MIldly antalgic gait\par

## 2023-03-16 NOTE — HISTORY OF PRESENT ILLNESS
[3] : 3 [0] : 0 [Dull/Aching] : dull/aching [Localized] : localized [Full time] : Work status: full time [de-identified] : 03/06/2023 pt presents here today with left shoulder/right knee pain since friday 03/03/2023 after tripped and fell ,pt went to [facility] md next day \par h/o left shoulder rct repair  [] : Post Surgical Visit: no [FreeTextEntry1] : R Knee [FreeTextEntry3] : 3/3/23 [FreeTextEntry5] : 63 y/o RHD F eval R Knee s/p MRI due to a trip and fall on above DOI pt states condition has improved since last visit  [de-identified] : MRI\par Playmaker  [de-identified] :

## 2023-03-17 ENCOUNTER — APPOINTMENT (OUTPATIENT)
Dept: ORTHOPEDIC SURGERY | Facility: CLINIC | Age: 63
End: 2023-03-17
Payer: COMMERCIAL

## 2023-03-17 VITALS — HEIGHT: 63 IN | WEIGHT: 165 LBS | BODY MASS INDEX: 29.23 KG/M2

## 2023-03-17 PROCEDURE — 73010 X-RAY EXAM OF SHOULDER BLADE: CPT | Mod: LT

## 2023-03-17 PROCEDURE — 73030 X-RAY EXAM OF SHOULDER: CPT | Mod: LT

## 2023-03-17 PROCEDURE — 99214 OFFICE O/P EST MOD 30 MIN: CPT

## 2023-03-17 PROCEDURE — 99072 ADDL SUPL MATRL&STAF TM PHE: CPT

## 2023-03-17 NOTE — REASON FOR VISIT
[FreeTextEntry2] : This is a 62 year old RHD female  with left shoulder pain after fall march 3rd 2023 neg LOC, also addressing concussion. Dr. Chand did surgery 2017, and again 2018. Night symptoms can occur. There is no n/t. Motrin use but d/c due concussion, and now taking tramadol with temp relief. Since fall minimal improvement changes in quality of pain. \par \par 3/7/2017 Left Shoulder Arthroscopy, Glenohumeral Debridement, Synovectomy, Lysis of Adhesions, Manipulation, Subacromial Decompression, Glenohumeral Injection\par \par 7/17/2018 Left Shoulder Arthroscopy, Glenohumeral Debridement, Lysis of Adhesions, Revision Subacromial Decompression, Small Rotator Cuff Repair, Distal Clavicle Resection.

## 2023-03-17 NOTE — CONSULT LETTER
[Dear  ___] : Dear  [unfilled], [Consult Letter:] : I had the pleasure of evaluating your patient, [unfilled]. [Please see my note below.] : Please see my note below. [Consult Closing:] : Thank you very much for allowing me to participate in the care of this patient.  If you have any questions, please do not hesitate to contact me. [Sincerely,] : Sincerely, [FreeTextEntry2] : BECK GÓMEZ [FreeTextEntry3] : Dr. Mandeep Chand\par Shoulder Surgery\par

## 2023-03-17 NOTE — HISTORY OF PRESENT ILLNESS
[Sudden] : sudden [5] : 5 [3] : 3 [Sleep] : sleep [Rest] : rest [Meds] : meds [] : no [FreeTextEntry1] : left shoulder  [FreeTextEntry3] : 03/03/2023 [FreeTextEntry5] : pt tripped over a pipe that was sticking out of the sidewalk and injured her left shoulder about 2 weeks ago [FreeTextEntry7] : to her neck slightly  [FreeTextEntry9] : motrin and tramadol  [de-identified] : movement  [de-identified] : 03/16/2023 [de-identified] :  [de-identified] : 2017 and 2018 [de-identified] : MRI

## 2023-03-17 NOTE — PHYSICAL EXAM
[Left] : left shoulder [Right] : right shoulder [Sitting] : sitting [Mild] : mild [] : no sensory deficits [TWNoteComboBox4] : passive forward flexion 160 degrees [TWNoteComboBox6] : internal rotation L1 [de-identified] : external rotation 60 degrees

## 2023-03-17 NOTE — DATA REVIEWED
[I independently reviewed and interpreted images and report] : I independently reviewed and interpreted images and report [FreeTextEntry1] : MRI left shoulder 03/13/23 There is posterior HH edema. The cuff and biceps look intact. There is soft tissue swelling. The muscle is decent. Po op changes evident.

## 2023-03-17 NOTE — ASSESSMENT
[FreeTextEntry1] : We discussed the underlying pathology. \par Treatment options reviewed. \par Medrol is prescribed. \par Tylenol use causes headaches. \par She'll continue with tramadol. \par Start PT. \par RTW FD 03/27/23. \par \par Cautions discussed. \par Questions answered.\par

## 2023-03-17 NOTE — IMAGING
[Left] : left shoulder [FreeTextEntry1] : The GH is OK. Evidence of prior DCE and acromioplasty. GT changes.  [FreeTextEntry5] : There is a type II acromion.

## 2023-04-10 ENCOUNTER — APPOINTMENT (OUTPATIENT)
Dept: ORTHOPEDIC SURGERY | Facility: CLINIC | Age: 63
End: 2023-04-10
Payer: COMMERCIAL

## 2023-04-10 VITALS — BODY MASS INDEX: 29.23 KG/M2 | HEIGHT: 63 IN | WEIGHT: 165 LBS

## 2023-04-10 DIAGNOSIS — S40.012D CONTUSION OF LEFT SHOULDER, SUBSEQUENT ENCOUNTER: ICD-10-CM

## 2023-04-10 DIAGNOSIS — Z98.890 OTHER SPECIFIED POSTPROCEDURAL STATES: ICD-10-CM

## 2023-04-10 PROCEDURE — 99072 ADDL SUPL MATRL&STAF TM PHE: CPT

## 2023-04-10 PROCEDURE — 99214 OFFICE O/P EST MOD 30 MIN: CPT

## 2023-04-10 NOTE — ASSESSMENT
[FreeTextEntry1] : We discussed her issues.\par She has an upcoming lumbar steroid injection, so we will avoid NSAIDs.\par I have suggested she hold PT.\par She will cont her HEP.\par A L SA/GH injection is elected.\par Tho she is on abx for a finger infection.\par She will hold off on the SA injection.\par F/u in a month is advised, or sooner.\par Questions answered.\par \par Patient seen by Mandeep Chand M.D.\par \par

## 2023-04-10 NOTE — HISTORY OF PRESENT ILLNESS
[7] : 7 [4] : 4 [de-identified] : pt is here today for a follow up for her left shoulder. pt states her pain is similar to last visit  [FreeTextEntry1] : left shoulder  [de-identified] : physical therapy and tramadol used as needed

## 2023-04-10 NOTE — PHYSICAL EXAM
[Left] : left shoulder [Sitting] : sitting [Mild] : mild [Right] : right shoulder [] : no sensory deficits [TWNoteComboBox4] : passive forward flexion 160 degrees [TWNoteComboBox6] : internal rotation L1 [de-identified] : external rotation 60 degrees

## 2023-04-10 NOTE — REASON FOR VISIT
[FreeTextEntry2] : This is a 62 year old RHD female  with left shoulder pain after fall march 3rd 2023 neg LOC, also addressing concussion. Dr. Chand did surgery 2017, and again 2018. Night symptoms can occur. There is no n/t. Motrin use but d/c due concussion, and now taking tramadol with temp relief. Since fall minimal improvement changes in quality of pain. Medrol did not help nor has PT.\par \par 3/7/2017 Left Shoulder Arthroscopy, Glenohumeral Debridement, Synovectomy, Lysis of Adhesions, Manipulation, Subacromial Decompression, Glenohumeral Injection\par \par 7/17/2018 Left Shoulder Arthroscopy, Glenohumeral Debridement, Lysis of Adhesions, Revision Subacromial Decompression, Small Rotator Cuff Repair, Distal Clavicle Resection.

## 2023-04-19 ENCOUNTER — APPOINTMENT (OUTPATIENT)
Dept: PAIN MANAGEMENT | Facility: CLINIC | Age: 63
End: 2023-04-19
Payer: COMMERCIAL

## 2023-04-19 PROCEDURE — 64636Z: CUSTOM | Mod: 59,LT

## 2023-04-19 PROCEDURE — 64635 DESTROY LUMB/SAC FACET JNT: CPT | Mod: LT

## 2023-04-19 PROCEDURE — 99072 ADDL SUPL MATRL&STAF TM PHE: CPT

## 2023-04-19 NOTE — PROCEDURE
[FreeTextEntry3] : Date of Service: 04/19/2023 \par \par Account: [id number]\par \par Patient: NAIDA FAM \par \par YOB: 1960\par \par Age: 62 year\par \par \par Surgeon:  Crispin Acuna M.D.\par \par PREOPERATIVE DIAGNOSIS: Spondylosis of lumbar region without myelopathy or radiculopathy\par \par POSTOPERATIVE DIAGNOSIS: Spondylosis of lumbar region without myelopathy or radiculopathy \par \par PROCEDURE:\par \par      1) Left sided L2, L3, L4, L5 medial branch radiofrequency ablation under fluoroscopic guidance.  \par \par Anesthesia:                             MAC\par \par \par Risks, benefits and alternatives of the procedure were discussed with the patient after which she agreed to proceed.  Patient was brought into fluoroscopy suite and was placed in prone position with hip support. Back was prepped and draped in a sterile fashion x3. Anesthesia initiated. \par \par Under AP visualization, the left sacral ala was identified and marked. Using a 25 gauge ½ inch needle the skin and subcutaneous structures at this point were localized with 1% Lidocaine using approximately 3 cc's 1% Lidocaine.  After this, a 20 gauge 100mm [place] radiofrequency needle with a 10mm curved tip was inserted and using depth direction depth technique under constant fluoroscopic visualization, the needle was advanced to the sacral ala until os was contacted.  \par \par The camera was then redirected under AP view to visualize the L3,L4,L5 vertebra, the camera was left oblique to approximately 30 degrees to reveal good Emile dog anatomical view. The junction of the superior articulate process and transverse process at the L3,L4,L5 levels on the left were then identified and marked. Skin and subcutaneous structures were then anesthetized with approximately 3 cc's of 1% Lidocaine at each of these levels. After which a 20 gauge 100mm [place] radiofrequency needle with a 10mm curved tip then advanced until the junction at the SAP and transverse process was met.  The camera was then directed through the lateral view and under constant fluoroscopic visualization, the needle tips were then advanced and confirmed at the junction of the SAP and transverse process.  \par \par The stylette for the most cephalad needle at the L3 level was then removed and a [place] radiofrequency probe was then placed inside the needle. After her pedis' were checked at approximately 300 ohm, 50 hertz sensory stimulation was performed.  Patient experienced concordant pain in his left low back at approximately 0.3 volts. The voltage was then increased to 1 volt. The patient reported increased left low back pain symptoms without any radiation below the left knee.  Stimulation was then changed to 2 hertz and increased slowly by .1 volt increments at approximately 0.5 volts, patient began to experience thumping like reproductive pain in his left low back. The voltage was then increased to approximately 2.5 volts. Patient experienced increasing thumping without any sensation below his left knee. This exact stimulation was then repeated for the L4 and L5 needles as well as sacral ala needle with concordant pain and no radiation below the left knee. The 3 levels were then anesthetized with approximately 0.5 cc's of 1% Lidocaine. After which each area was then ablated at 80 degrees centigrade for 60 seconds each. Patient felt no pain reproduction during the ablation procedure.  After each of these levels were ablated and injected approximately 1 cc of 0.25% Bupivacaine plus 10 mg of depo-medrol were then injected before the needles were removed.  Pressure was then applied to the low back. Band-aids were applied.  Patient was brought to the recovery, ambulated on his own after the procedure and reported decreased left sided low back pain. \par \par Anesthesia personnel were present throughout the procedure. Patient was told to apply ice to the low back for 20 minutes on and 20 minutes off for focal symptoms for 24-48 hours. He is to call the office if he had any questions or concerns.  \par \par \par Crispin Acuna M.D.\par

## 2023-04-20 ENCOUNTER — APPOINTMENT (OUTPATIENT)
Dept: ORTHOPEDIC SURGERY | Facility: CLINIC | Age: 63
End: 2023-04-20

## 2023-05-01 ENCOUNTER — APPOINTMENT (OUTPATIENT)
Dept: PAIN MANAGEMENT | Facility: CLINIC | Age: 63
End: 2023-05-01
Payer: COMMERCIAL

## 2023-05-01 VITALS — WEIGHT: 165 LBS | BODY MASS INDEX: 29.23 KG/M2 | HEIGHT: 63 IN

## 2023-05-01 PROCEDURE — J3490M: CUSTOM

## 2023-05-01 PROCEDURE — 99072 ADDL SUPL MATRL&STAF TM PHE: CPT

## 2023-05-01 PROCEDURE — 20552 NJX 1/MLT TRIGGER POINT 1/2: CPT

## 2023-05-01 PROCEDURE — 99214 OFFICE O/P EST MOD 30 MIN: CPT | Mod: 25

## 2023-05-01 NOTE — REASON FOR VISIT
[Follow-Up Visit] : a follow-up pain management visit [FreeTextEntry2] : LT L2,L3,L4,L5 RADIOFREQUENCY ABLATION ( DOS 04/19/23- [facility] )

## 2023-05-01 NOTE — PHYSICAL EXAM
[de-identified] : PHYSICAL EXAM\par \par Constitutional: \par Appears well, no apparent distress\par Ability to communicate: Normal\par Respiratory: non-labored breathing\par Skin: no rash noted\par Head: normocephalic, atraumatic\par Neck: no visible thyroid enlargement\par Eyes: extraocular movements intact\par Neurologic: alert and oriented x3\par Psychiatric: normal mood, affect, and behavior\par \par Lumbar Spine: \par Palpation: left and right lumbar paraspinal spasm and left and right lumbar paraspinal tenderness to palpation.\par ROM: Diminished range of motion in all plains.  Patient notes pain with lateral bending to the left and right.\par MMT: Motor exam is 5/5 through out bilateral lower extremities.\par Sensation: Light touch and pain is intact throughout bilateral lower extremities.\par Reflexes: achilles and patella reflexes are intact and  symmetrical.  No sustained clonus.\par Special Testing: Positive kemps maneuver on the left and right side\par \par Assessemnt:\par Lumbar spondylosis (m47.816)\par Myalgia (M79.10)\par \par

## 2023-05-01 NOTE — HISTORY OF PRESENT ILLNESS
[Lower back] : lower back [6] : 6 [Radiating] : radiating [Shooting] : shooting [Constant] : constant [Ice] : ice [Injection therapy] : injection therapy [Standing] : standing [Walking] : walking [Bending forward] : bending forward [FreeTextEntry1] : pt is following up after RFA ( left sided ) , she states it helped her a bit  [] : no [FreeTextEntry7] : left side

## 2023-06-19 ENCOUNTER — APPOINTMENT (OUTPATIENT)
Dept: PAIN MANAGEMENT | Facility: CLINIC | Age: 63
End: 2023-06-19

## 2023-07-12 ENCOUNTER — APPOINTMENT (OUTPATIENT)
Dept: PAIN MANAGEMENT | Facility: CLINIC | Age: 63
End: 2023-07-12
Payer: COMMERCIAL

## 2023-07-12 VITALS — WEIGHT: 165 LBS | BODY MASS INDEX: 29.23 KG/M2 | HEIGHT: 63 IN

## 2023-07-12 PROCEDURE — 99213 OFFICE O/P EST LOW 20 MIN: CPT

## 2023-07-12 NOTE — HISTORY OF PRESENT ILLNESS
[Lower back] : lower back [Radiating] : radiating [Shooting] : shooting [Constant] : constant [Ice] : ice [Injection therapy] : injection therapy [Standing] : standing [Walking] : walking [Bending forward] : bending forward [5] : 5 [4] : 4 [] : no [FreeTextEntry7] : left side

## 2023-07-12 NOTE — PHYSICAL EXAM
[de-identified] : PHYSICAL EXAM\par \par Constitutional: \par Appears well, no apparent distress\par Ability to communicate: Normal\par Respiratory: non-labored breathing\par Skin: no rash noted\par Head: normocephalic, atraumatic\par Neck: no visible thyroid enlargement\par Eyes: extraocular movements intact\par Neurologic: alert and oriented x3\par Psychiatric: normal mood, affect, and behavior\par \par \par Back, including spine: Range of motion of the thoracic and lumbar spine is as follows: Diminished range of motion in all planes.  MMT 5/5 BL LE.  Sensation is intact to light touch and pin prick BL LE.  Negative Straight leg raise testing bilaterally.  Negatvie Kemps maneuver bilaterally.  Normal Gait.\par \par \par Assessment:\par Lumbago\par \par Plan:\par After discussing various treatment options with the patient including but not limited to oral medications, physical therapy, exercise modalities as well as interventional spinal injections, we have decided with the following plan:\par  \par Continue home exercises, stretching, activity modification, physical therapy, and conservative care.\par \par \par

## 2023-07-24 ENCOUNTER — APPOINTMENT (OUTPATIENT)
Dept: ORTHOPEDIC SURGERY | Facility: CLINIC | Age: 63
End: 2023-07-24
Payer: COMMERCIAL

## 2023-07-24 VITALS — HEIGHT: 63 IN | WEIGHT: 165 LBS | BODY MASS INDEX: 29.23 KG/M2

## 2023-07-24 DIAGNOSIS — Z00.00 ENCOUNTER FOR GENERAL ADULT MEDICAL EXAMINATION W/OUT ABNORMAL FINDINGS: ICD-10-CM

## 2023-07-24 DIAGNOSIS — S39.012A STRAIN OF MUSCLE, FASCIA AND TENDON OF LOWER BACK, INITIAL ENCOUNTER: ICD-10-CM

## 2023-07-24 PROCEDURE — 72170 X-RAY EXAM OF PELVIS: CPT

## 2023-07-24 PROCEDURE — 72100 X-RAY EXAM L-S SPINE 2/3 VWS: CPT

## 2023-07-24 PROCEDURE — 99214 OFFICE O/P EST MOD 30 MIN: CPT

## 2023-07-24 RX ORDER — NAPROXEN 500 MG/1
500 TABLET ORAL
Qty: 60 | Refills: 0 | Status: ACTIVE | COMMUNITY
Start: 2023-07-24 | End: 1900-01-01

## 2023-07-24 RX ORDER — CYCLOBENZAPRINE HYDROCHLORIDE 5 MG/1
5 TABLET, FILM COATED ORAL
Qty: 30 | Refills: 0 | Status: ACTIVE | COMMUNITY
Start: 2023-07-24 | End: 1900-01-01

## 2023-07-24 RX ORDER — METHYLPREDNISOLONE 4 MG/1
4 TABLET ORAL
Qty: 1 | Refills: 0 | Status: ACTIVE | COMMUNITY
Start: 2023-03-17 | End: 1900-01-01

## 2023-07-24 NOTE — IMAGING
[Straightening consistent with spasm] : Straightening consistent with spasm [Disc space narrowing] : Disc space narrowing [AP] : anteroposterior [There are no fractures, subluxations or dislocations. No significant abnormalities are seen] : There are no fractures, subluxations or dislocations. No significant abnormalities are seen [de-identified] : L spine\par \par Inspection: No rash or ecchymosis \par \par Palpation: midline lumbar tenderness, left lumbar paraspinal tenderness\par \par ROM: limited all planes\par \par Strength: 5/5 bilateral hip flexors, knee extensors, ankle dorsiflexors, EHL, ankle plantarflexors \par \par Sensation: Sensation present to light touch bilateral L2-S1 distributions \par \par Provocative maneuvers: positive contralateral R SLR, positive ipsilateral L SLR\par

## 2023-07-24 NOTE — HISTORY OF PRESENT ILLNESS
[de-identified] : 7/24/23- 62 y/o F presents for left-sided lower back pain since bending down earlier today. Associated radiation to L glutes/posterior thigh. Aggravated by prolonged sitting/standing. Difficulty finding position of comfort. Has tried tramadol, with slight relief. Denies b/b dysfunction.\par \par PMH: hypothyroidism. Denies DM/HTN/CKD [FreeTextEntry1] : low back  [FreeTextEntry5] : pt bent down today and felt pain in her low back \par pt c.o pain in low back since today

## 2023-07-24 NOTE — ASSESSMENT
[FreeTextEntry1] : lumbar strain, possible LLE radic\par PT\par MDP, naproxen for when MDP finished\par cyclobenzaprine \par f/u 2 weeks with spine specialist\par \par \par NSAIDs- Patient warned of risk of medication to GI tract, increased blood pressure, cardiac risk, and risk of fluid retention.  Advised to clear medication with internist or PCP if any concurrent health problem with heart, blood pressure, or GI system exists.\par \par Muscle Relaxants- To help decrease muscle spasm and assist with pain relief. Advised of sedating effects and instructed not to drive, operate heavy machinery, or take with other sedating medications.\par

## 2023-08-07 ENCOUNTER — APPOINTMENT (OUTPATIENT)
Dept: ORTHOPEDIC SURGERY | Facility: CLINIC | Age: 63
End: 2023-08-07

## 2023-08-09 ENCOUNTER — APPOINTMENT (OUTPATIENT)
Dept: PAIN MANAGEMENT | Facility: CLINIC | Age: 63
End: 2023-08-09
Payer: COMMERCIAL

## 2023-08-09 VITALS — BODY MASS INDEX: 29.23 KG/M2 | HEIGHT: 63 IN | WEIGHT: 165 LBS

## 2023-08-09 DIAGNOSIS — M79.18 MYALGIA, OTHER SITE: ICD-10-CM

## 2023-08-09 PROCEDURE — 20552 NJX 1/MLT TRIGGER POINT 1/2: CPT

## 2023-08-09 PROCEDURE — J3490M: CUSTOM

## 2023-08-09 PROCEDURE — 99214 OFFICE O/P EST MOD 30 MIN: CPT | Mod: 25

## 2023-08-09 NOTE — PHYSICAL EXAM
[de-identified] : PHYSICAL EXAM  Constitutional:  Appears well, no apparent distress Ability to communicate: Normal Respiratory: non-labored breathing Skin: no rash noted Head: normocephalic, atraumatic Neck: no visible thyroid enlargement Eyes: extraocular movements intact Neurologic: alert and oriented x3 Psychiatric: normal mood, affect, and behavior  Lumbar Spine:  Palpation: left lumbar paraspinal spasm and left lumbar paraspinal tenderness to palpation. ROM: Diminished range of motion in all plains.  Patient notes pain with lateral bending to the left. MMT: Motor exam is 5/5 through out bilateral lower extremities. Sensation: Light touch and pain is intact throughout bilateral lower extremities. Reflexes: achilles and patella reflexes are intact and  symmetrical.  No sustained clonus. Special Testing: Positive straight leg raise on the left side.

## 2023-08-09 NOTE — HISTORY OF PRESENT ILLNESS
[Lower back] : lower back [4] : 4 [Radiating] : radiating [Shooting] : shooting [Constant] : constant [Ice] : ice [Injection therapy] : injection therapy [Standing] : standing [Walking] : walking [Bending forward] : bending forward [5] : 5 [FreeTextEntry1] : pt states 2 weeks ago she blew her back out came into urgent care and informed her that she needed to follow up with Dr. Acuna [] : no [FreeTextEntry7] : left side

## 2023-08-09 NOTE — ASSESSMENT
[FreeTextEntry1] : threw out lower back 7/24/23- seen in urgent care was given medrol pack and naproxen, cyclobenzaprine  states that she is feeling better now- pain is in lower back L side into L glut initially had been radiaiting into LLE

## 2023-08-09 NOTE — DISCUSSION/SUMMARY
[Surgical risks reviewed] : Surgical risks reviewed [de-identified] : tpi today  will call L L45 5-S1 [id number]  After discussing various treatment options with the patient including but not limited to oral medications, physical therapy, exercise, modalities as well as interventional spinal injections, we have decided with the following plan: I personally reviewed the MRI/CT scan images and agree with the radiologist's report. The radiological findings were discussed with the patient. The risks, benefits, contents and alternatives to injection were explained in full to the patient. Risks outlined include but are not limited to infection,sepsis, bleeding, post-dural puncture headache, nerve damage, temporary increase in pain, syncopal episode, failure to resolve symptoms, allergic reaction, symptom recurrence, and elevation of blood sugar in diabetics. Cortisone may cause immunosuppression. Patient understands the risks. All questions were answered. After discussion of options, patient requested an injection. Information regarding the injection was given to the patient. Which medications to stop prior to the injection was explained to the patient as well. Follow up in 1-2 weeks post injection for re-evaluation. Continue Home exercises, stretching, activity modification, physical therapy, and conservative care. Patient is presenting with acute/sub-acute radicular pain with impairment in ADLs and functionality. The pain has not responded to conservative care including nsaid therapy and/or physical therapy. There is no bleeding tendency, unstable medical condition, or systemic infection.

## 2023-08-09 NOTE — PROCEDURE
[FreeTextEntry3] : Trigger Point was performed because of pain inflammation Anesthesia: ethyl chloride sprayed topically.: Lidocaine .1% 3cc Marcaine:.25% 3cckenalog 10mg/cc 3cc :Needle size: 25 gauge 1 1/2inch.  Medication was injected in the left Lumbar paraspinal muscles . Patient has tried OTC's including aspirin, Ibuprofen, Aleve etc or prescription NSAIDS, and/or exercises at home and/ or physical therapy without satisfactory response After verbal consent using sterile preparation and technique.The risks, benefits, and alternatives to cortisone injection were explained in full to the patient. Risks outlined include but are not limited to infection, sepsis, bleeding, scarring, skin discoloration, temporary increase in pain, syncopal episode, failure to resolve symptoms, allergic reaction, symptom recurrence, and elevation of blood sugar in diabetics. Patient understood the risks. All questions were answered. After discussion of options, patient requested an injection. Oral informed consent was obtained and sterile prep was done of the injection site. Sterile technique was utilized for the procedure including the preparation of the solutions used for the injection. Patient tolerated the procedure well. Advised to ice the injection site this evening.  Sterile technique used Prep with alcohol locally to site.

## 2023-09-06 ENCOUNTER — EMERGENCY (EMERGENCY)
Facility: HOSPITAL | Age: 63
LOS: 1 days | Discharge: HOME | End: 2023-09-06
Admitting: EMERGENCY MEDICINE
Payer: COMMERCIAL

## 2023-09-06 VITALS
DIASTOLIC BLOOD PRESSURE: 69 MMHG | RESPIRATION RATE: 16 BRPM | OXYGEN SATURATION: 98 % | TEMPERATURE: 99 F | SYSTOLIC BLOOD PRESSURE: 122 MMHG | HEART RATE: 82 BPM

## 2023-09-06 PROCEDURE — 99284 EMERGENCY DEPT VISIT MOD MDM: CPT

## 2023-09-06 PROCEDURE — 73090 X-RAY EXAM OF FOREARM: CPT | Mod: 26,RT

## 2023-09-06 PROCEDURE — 73502 X-RAY EXAM HIP UNI 2-3 VIEWS: CPT | Mod: 26,RT

## 2023-09-06 PROCEDURE — 73562 X-RAY EXAM OF KNEE 3: CPT | Mod: 26,RT

## 2023-09-06 PROCEDURE — 73030 X-RAY EXAM OF SHOULDER: CPT | Mod: 26,RT

## 2023-09-06 PROCEDURE — 73552 X-RAY EXAM OF FEMUR 2/>: CPT | Mod: 26,RT

## 2023-09-06 PROCEDURE — 73130 X-RAY EXAM OF HAND: CPT | Mod: 26,RT

## 2023-09-06 PROCEDURE — 73110 X-RAY EXAM OF WRIST: CPT | Mod: 26,LT

## 2023-09-06 PROCEDURE — 93010 ELECTROCARDIOGRAM REPORT: CPT

## 2023-09-06 RX ORDER — ACETAMINOPHEN 500 MG
975 TABLET ORAL ONCE
Refills: 0 | Status: COMPLETED | OUTPATIENT
Start: 2023-09-06 | End: 2023-09-06

## 2023-09-06 RX ORDER — IBUPROFEN 200 MG
600 TABLET ORAL ONCE
Refills: 0 | Status: COMPLETED | OUTPATIENT
Start: 2023-09-06 | End: 2023-09-06

## 2023-09-06 RX ADMIN — Medication 975 MILLIGRAM(S): at 19:58

## 2023-09-06 RX ADMIN — Medication 600 MILLIGRAM(S): at 19:58

## 2023-09-06 NOTE — ED ADULT TRIAGE NOTE - TEMPERATURE IN CELSIUS (DEGREES C)
From: Gayle Rainey  To: Nishi Mendiola MD  Sent: 7/22/2020 5:19 PM CDT  Subject: After-Visit Question    I have a question about SARS-COV-2 GENE DETECTION (EXACT SCIENCES), [place] ONLY resulted on 7/13/20, 10:48 AM.      Anyway I could have a document to give to my work that states my name on it and [facility] stating that my test results are Negative? My work is requesting it. Thank you.    -Gayle Rainey   37.1
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
## 2023-09-06 NOTE — ED PROVIDER NOTE - PHYSICAL EXAMINATION
Gen: Well appearing in NAD  Head: NC/AT  Neck: trachea midline  Resp:  No distress  Ext: no deformities  Neuro:  A&O appears non focal  Skin:  Warm and dry as visualized  Psych:  Normal affect and mood     Neck/back: No midline tenderness no swelling no obvious deformity full range of motion with mild pain able to straight leg bilaterally without difficulty sensations intact  Right upper extremity: Right shoulder full range of motion no tenderness to palpation.  Right elbow no swelling no obvious deformity mild pain with range of motion.  Right forearm no swelling noted but positive tenderness to palpation distal forearm distal wrist as well as lateral hand.  No deformity palpated full range of motion sensation is intact able to oppose all fingers.    Right hip no swelling no obvious deformity no bruising noted full range of motion with mild pain no joint laxity noted.  Right knee no swelling no obvious deformity no ecchymosis or signs of trauma full range of motion with pain no joint laxity noted sensations intact.

## 2023-09-07 ENCOUNTER — APPOINTMENT (OUTPATIENT)
Dept: ORTHOPEDIC SURGERY | Facility: CLINIC | Age: 63
End: 2023-09-07
Payer: COMMERCIAL

## 2023-09-07 VITALS — WEIGHT: 165 LBS | HEIGHT: 63 IN | BODY MASS INDEX: 29.23 KG/M2

## 2023-09-07 DIAGNOSIS — S62.025K NONDISPLACED FRACTURE OF MIDDLE THIRD OF NAVICULAR [SCAPHOID] BONE OF LEFT WRIST, SUBSEQUENT ENCOUNTER FOR FRACTURE WITH NONUNION: ICD-10-CM

## 2023-09-07 DIAGNOSIS — M25.511 PAIN IN RIGHT SHOULDER: ICD-10-CM

## 2023-09-07 PROCEDURE — 99214 OFFICE O/P EST MOD 30 MIN: CPT

## 2023-09-07 PROCEDURE — 72050 X-RAY EXAM NECK SPINE 4/5VWS: CPT | Mod: 1L

## 2023-09-07 PROCEDURE — L3809: CPT | Mod: LT

## 2023-09-07 PROCEDURE — 72170 X-RAY EXAM OF PELVIS: CPT

## 2023-09-07 PROCEDURE — 72040 X-RAY EXAM NECK SPINE 2-3 VW: CPT

## 2023-09-07 PROCEDURE — 72100 X-RAY EXAM L-S SPINE 2/3 VWS: CPT

## 2023-09-07 PROCEDURE — 73030 X-RAY EXAM OF SHOULDER: CPT | Mod: RT

## 2023-09-07 RX ORDER — METHYLPREDNISOLONE 4 MG/1
4 TABLET ORAL
Qty: 1 | Refills: 0 | Status: ACTIVE | COMMUNITY
Start: 2023-09-07 | End: 1900-01-01

## 2023-09-07 RX ORDER — CYCLOBENZAPRINE HYDROCHLORIDE 10 MG/1
10 TABLET, FILM COATED ORAL
Qty: 30 | Refills: 0 | Status: ACTIVE | COMMUNITY
Start: 2023-09-07 | End: 1900-01-01

## 2023-09-07 NOTE — DISCUSSION/SUMMARY
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
## 2023-09-08 ENCOUNTER — APPOINTMENT (OUTPATIENT)
Dept: MRI IMAGING | Facility: CLINIC | Age: 63
End: 2023-09-08
Payer: COMMERCIAL

## 2023-09-08 PROCEDURE — 73221 MRI JOINT UPR EXTREM W/O DYE: CPT | Mod: RT

## 2023-09-08 PROCEDURE — 72141 MRI NECK SPINE W/O DYE: CPT

## 2023-09-09 PROBLEM — E05.90 THYROTOXICOSIS, UNSPECIFIED WITHOUT THYROTOXIC CRISIS OR STORM: Chronic | Status: ACTIVE | Noted: 2023-09-06

## 2023-09-11 ENCOUNTER — APPOINTMENT (OUTPATIENT)
Dept: MRI IMAGING | Facility: CLINIC | Age: 63
End: 2023-09-11
Payer: COMMERCIAL

## 2023-09-11 ENCOUNTER — APPOINTMENT (OUTPATIENT)
Dept: ORTHOPEDIC SURGERY | Facility: CLINIC | Age: 63
End: 2023-09-11

## 2023-09-11 PROCEDURE — 73221 MRI JOINT UPR EXTREM W/O DYE: CPT | Mod: LT

## 2023-09-12 ENCOUNTER — APPOINTMENT (OUTPATIENT)
Dept: ORTHOPEDIC SURGERY | Facility: CLINIC | Age: 63
End: 2023-09-12
Payer: COMMERCIAL

## 2023-09-12 VITALS — WEIGHT: 165 LBS | BODY MASS INDEX: 29.23 KG/M2 | HEIGHT: 63 IN

## 2023-09-12 DIAGNOSIS — M23.91 UNSPECIFIED INTERNAL DERANGEMENT OF RIGHT KNEE: ICD-10-CM

## 2023-09-12 DIAGNOSIS — M79.18 MYALGIA, OTHER SITE: ICD-10-CM

## 2023-09-12 PROCEDURE — 99214 OFFICE O/P EST MOD 30 MIN: CPT

## 2023-09-12 PROCEDURE — 73564 X-RAY EXAM KNEE 4 OR MORE: CPT | Mod: RT

## 2023-09-12 RX ORDER — NAPROXEN 500 MG/1
500 TABLET ORAL TWICE DAILY
Qty: 60 | Refills: 0 | Status: ACTIVE | COMMUNITY
Start: 2023-09-12 | End: 1900-01-01

## 2023-09-15 ENCOUNTER — APPOINTMENT (OUTPATIENT)
Dept: MRI IMAGING | Facility: CLINIC | Age: 63
End: 2023-09-15
Payer: COMMERCIAL

## 2023-09-15 PROCEDURE — 73721 MRI JNT OF LWR EXTRE W/O DYE: CPT | Mod: RT

## 2023-09-18 ENCOUNTER — TRANSCRIPTION ENCOUNTER (OUTPATIENT)
Age: 63
End: 2023-09-18

## 2023-09-20 ENCOUNTER — APPOINTMENT (OUTPATIENT)
Dept: ORTHOPEDIC SURGERY | Facility: CLINIC | Age: 63
End: 2023-09-20
Payer: COMMERCIAL

## 2023-09-20 PROCEDURE — 20611 DRAIN/INJ JOINT/BURSA W/US: CPT | Mod: RT

## 2023-09-20 PROCEDURE — 99244 OFF/OP CNSLTJ NEW/EST MOD 40: CPT | Mod: 25

## 2023-09-21 PROBLEM — M67.51 SYNOVIAL PLICA SYNDROME OF RIGHT KNEE: Status: ACTIVE | Noted: 2023-09-21

## 2023-09-22 ENCOUNTER — APPOINTMENT (OUTPATIENT)
Dept: ORTHOPEDIC SURGERY | Facility: CLINIC | Age: 63
End: 2023-09-22
Payer: COMMERCIAL

## 2023-09-22 VITALS — WEIGHT: 165 LBS | HEIGHT: 63 IN | BODY MASS INDEX: 29.23 KG/M2

## 2023-09-22 PROCEDURE — 25630 CLTX CARPL FX W/O MNPJ EA B1: CPT | Mod: LT

## 2023-09-22 PROCEDURE — 99214 OFFICE O/P EST MOD 30 MIN: CPT | Mod: 57

## 2023-09-26 ENCOUNTER — APPOINTMENT (OUTPATIENT)
Dept: ORTHOPEDIC SURGERY | Facility: CLINIC | Age: 63
End: 2023-09-26
Payer: COMMERCIAL

## 2023-09-26 ENCOUNTER — APPOINTMENT (OUTPATIENT)
Age: 63
End: 2023-09-26
Payer: COMMERCIAL

## 2023-09-26 DIAGNOSIS — M67.51 PLICA SYNDROME, RIGHT KNEE: ICD-10-CM

## 2023-09-26 PROCEDURE — 64484 NJX AA&/STRD TFRM EPI L/S EA: CPT | Mod: 59,LT

## 2023-09-26 PROCEDURE — 64483 NJX AA&/STRD TFRM EPI L/S 1: CPT | Mod: LT

## 2023-10-02 PROBLEM — M17.11 ARTHRITIS OF KNEE, RIGHT: Status: ACTIVE | Noted: 2023-09-12

## 2023-10-02 PROBLEM — S80.01XD CONTUSION OF RIGHT KNEE, SUBSEQUENT ENCOUNTER: Status: ACTIVE | Noted: 2023-03-16

## 2023-10-03 ENCOUNTER — APPOINTMENT (OUTPATIENT)
Dept: ORTHOPEDIC SURGERY | Facility: CLINIC | Age: 63
End: 2023-10-03
Payer: COMMERCIAL

## 2023-10-03 VITALS — WEIGHT: 165 LBS | HEIGHT: 63 IN | BODY MASS INDEX: 29.23 KG/M2

## 2023-10-03 DIAGNOSIS — M17.11 UNILATERAL PRIMARY OSTEOARTHRITIS, RIGHT KNEE: ICD-10-CM

## 2023-10-03 DIAGNOSIS — S80.01XD CONTUSION OF RIGHT KNEE, SUBSEQUENT ENCOUNTER: ICD-10-CM

## 2023-10-03 PROCEDURE — 99214 OFFICE O/P EST MOD 30 MIN: CPT | Mod: 24

## 2023-10-04 ENCOUNTER — APPOINTMENT (OUTPATIENT)
Dept: ORTHOPEDIC SURGERY | Facility: CLINIC | Age: 63
End: 2023-10-04
Payer: COMMERCIAL

## 2023-10-04 DIAGNOSIS — M54.12 RADICULOPATHY, CERVICAL REGION: ICD-10-CM

## 2023-10-04 DIAGNOSIS — Z98.1 ARTHRODESIS STATUS: ICD-10-CM

## 2023-10-04 PROCEDURE — 99214 OFFICE O/P EST MOD 30 MIN: CPT | Mod: 24

## 2023-10-12 ENCOUNTER — APPOINTMENT (OUTPATIENT)
Dept: PAIN MANAGEMENT | Facility: CLINIC | Age: 63
End: 2023-10-12
Payer: COMMERCIAL

## 2023-10-12 VITALS — HEIGHT: 63 IN | BODY MASS INDEX: 28.35 KG/M2 | WEIGHT: 160 LBS

## 2023-10-12 DIAGNOSIS — G89.4 CHRONIC PAIN SYNDROME: ICD-10-CM

## 2023-10-12 PROCEDURE — 99214 OFFICE O/P EST MOD 30 MIN: CPT

## 2023-10-13 ENCOUNTER — APPOINTMENT (OUTPATIENT)
Dept: ORTHOPEDIC SURGERY | Facility: CLINIC | Age: 63
End: 2023-10-13
Payer: COMMERCIAL

## 2023-10-13 VITALS — WEIGHT: 160 LBS | BODY MASS INDEX: 28.35 KG/M2 | HEIGHT: 63 IN

## 2023-10-13 PROCEDURE — 99024 POSTOP FOLLOW-UP VISIT: CPT

## 2023-11-01 ENCOUNTER — NON-APPOINTMENT (OUTPATIENT)
Age: 63
End: 2023-11-01

## 2023-11-01 ENCOUNTER — APPOINTMENT (OUTPATIENT)
Dept: ORTHOPEDIC SURGERY | Facility: CLINIC | Age: 63
End: 2023-11-01
Payer: COMMERCIAL

## 2023-11-01 VITALS — HEIGHT: 63 IN | BODY MASS INDEX: 28.35 KG/M2 | WEIGHT: 160 LBS

## 2023-11-01 PROCEDURE — 99214 OFFICE O/P EST MOD 30 MIN: CPT | Mod: 24

## 2023-11-08 ENCOUNTER — APPOINTMENT (OUTPATIENT)
Dept: PAIN MANAGEMENT | Facility: CLINIC | Age: 63
End: 2023-11-08
Payer: COMMERCIAL

## 2023-11-08 PROCEDURE — 64635 DESTROY LUMB/SAC FACET JNT: CPT | Mod: LT

## 2023-11-08 PROCEDURE — 64636Z: CUSTOM | Mod: 59,LT

## 2023-11-08 PROCEDURE — J3490M: CUSTOM

## 2023-11-17 ENCOUNTER — APPOINTMENT (OUTPATIENT)
Dept: ORTHOPEDIC SURGERY | Facility: CLINIC | Age: 63
End: 2023-11-17
Payer: COMMERCIAL

## 2023-11-17 VITALS — BODY MASS INDEX: 28.35 KG/M2 | WEIGHT: 160 LBS | HEIGHT: 63 IN

## 2023-11-17 PROCEDURE — 99024 POSTOP FOLLOW-UP VISIT: CPT

## 2023-11-29 ENCOUNTER — APPOINTMENT (OUTPATIENT)
Dept: ORTHOPEDIC SURGERY | Facility: CLINIC | Age: 63
End: 2023-11-29
Payer: COMMERCIAL

## 2023-11-29 VITALS — HEIGHT: 63 IN | BODY MASS INDEX: 28.35 KG/M2 | WEIGHT: 160 LBS

## 2023-11-29 PROCEDURE — 99214 OFFICE O/P EST MOD 30 MIN: CPT | Mod: 24

## 2023-11-29 RX ORDER — CELECOXIB 200 MG/1
200 CAPSULE ORAL TWICE DAILY
Qty: 60 | Refills: 0 | Status: COMPLETED | COMMUNITY
Start: 2023-11-29 | End: 2023-12-29

## 2023-11-30 ENCOUNTER — APPOINTMENT (OUTPATIENT)
Dept: PAIN MANAGEMENT | Facility: CLINIC | Age: 63
End: 2023-11-30
Payer: COMMERCIAL

## 2023-11-30 VITALS — HEIGHT: 63 IN | BODY MASS INDEX: 28.35 KG/M2 | WEIGHT: 160 LBS

## 2023-11-30 DIAGNOSIS — M47.817 SPONDYLOSIS W/OUT MYELOPATHY OR RADICULOPATHY, LUMBOSACRAL REGION: ICD-10-CM

## 2023-11-30 PROCEDURE — 99213 OFFICE O/P EST LOW 20 MIN: CPT

## 2023-12-06 ENCOUNTER — APPOINTMENT (OUTPATIENT)
Dept: ORTHOPEDIC SURGERY | Facility: CLINIC | Age: 63
End: 2023-12-06
Payer: COMMERCIAL

## 2023-12-06 DIAGNOSIS — M47.816 SPONDYLOSIS W/OUT MYELOPATHY OR RADICULOPATHY, LUMBAR REGION: ICD-10-CM

## 2023-12-06 PROCEDURE — 99213 OFFICE O/P EST LOW 20 MIN: CPT | Mod: 24

## 2023-12-15 ENCOUNTER — APPOINTMENT (OUTPATIENT)
Dept: ORTHOPEDIC SURGERY | Facility: CLINIC | Age: 63
End: 2023-12-15
Payer: COMMERCIAL

## 2023-12-15 VITALS — WEIGHT: 160 LBS | BODY MASS INDEX: 28.35 KG/M2 | HEIGHT: 63 IN

## 2023-12-15 PROCEDURE — 99024 POSTOP FOLLOW-UP VISIT: CPT
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
## 2024-01-10 ENCOUNTER — APPOINTMENT (OUTPATIENT)
Dept: ORTHOPEDIC SURGERY | Facility: CLINIC | Age: 64
End: 2024-01-10
Payer: COMMERCIAL

## 2024-01-10 VITALS — BODY MASS INDEX: 28.35 KG/M2 | WEIGHT: 160 LBS | HEIGHT: 63 IN

## 2024-01-10 PROCEDURE — 20611 DRAIN/INJ JOINT/BURSA W/US: CPT | Mod: RT

## 2024-01-10 PROCEDURE — 99214 OFFICE O/P EST MOD 30 MIN: CPT | Mod: 25

## 2024-01-10 NOTE — ASSESSMENT
[FreeTextEntry1] : We discussed her course.  There are surgical options.  We will continue with a nonoperative approach for now.  PT will continue.  An SA/GH injection is planned today.  Questions answered.   Patient was seen by Dr. Mandeep Chand. Entered by Elena Antonio acting as scribe.  Procedure Name: Large Joint Injection / Aspiration: Depomedrol, Lidocaine and Guidance Ultrasound  Large Joint Injection was performed because of pain and inflammation. Depomedrol: An injection of Depomedrol 40 mg , 2 cc. Lidocaine: An injection of Lidocaine 1 mg , 13 cc.  Medication was injected in the right subacromial space and glenohumeral joint. Patient has tried OTC's including aspirin, Ibuprofen, Aleve etc or prescription NSAIDS, and/or exercises at home and/ or physical therapy without satisfactory response. The risks, benefits, and alternatives to steroid injection were explained in full to the patient. Risks outlined include but are not limited to infection, sepsis, bleeding, scarring, skin discoloration, temporary increase in pain, syncopal episode, failure to resolve symptoms, allergic reaction, symptom recurrence, and elevation of blood sugar in diabetics. Patient understood the risks. All questions were answered. After discussion, patient requested an injection. Oral informed consent was obtained.  Sterile preparation with betadine and aseptic technique was utilized for the procedure, including the preparation of the solutions used for the injection. Patient tolerated the procedure well.   Post Procedure Instructions: Patient was advised to call if redness, pain, or fever occur and apply ice for 15 min. out of every hour for the next 12-24 hours as tolerated. Patient was advised to rest the joint(s) for 3 days.  Advised to ice the injection site this evening. Ultrasound Guidance was used for the following reasons: for precise injection in area of tear. Visualization of the needle and placement of injection was performed without complication.

## 2024-01-10 NOTE — PHYSICAL EXAM
[Right] : right shoulder [Mild] : mild [5 ___] : forward flexion 5[unfilled]/5 [5___] : external rotation 5[unfilled]/5 [Left] : left shoulder [Sitting] : sitting [] : no sensory deficits [FreeTextEntry3] : She is wearing a left thumb spica splint.  [FreeTextEntry9] : /60/L1. [TWNoteComboBox4] : passive forward flexion 165 degrees [TWNoteComboBox6] : internal rotation L2 [de-identified] : external rotation 80 degrees
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
## 2024-01-10 NOTE — DATA REVIEWED
[FreeTextEntry1] : MRI R SHOULDER [facility] 9/8/23:  There are no major cuff tears There are clear AC changes. There is slight biceps fluid. There is a possible partial subscapularis tear. The muscle is decent.

## 2024-01-12 ENCOUNTER — APPOINTMENT (OUTPATIENT)
Dept: ORTHOPEDIC SURGERY | Facility: CLINIC | Age: 64
End: 2024-01-12
Payer: COMMERCIAL

## 2024-01-12 VITALS — HEIGHT: 63 IN | BODY MASS INDEX: 28.35 KG/M2 | WEIGHT: 160 LBS

## 2024-01-12 DIAGNOSIS — S62.125A: ICD-10-CM

## 2024-01-12 PROCEDURE — 99213 OFFICE O/P EST LOW 20 MIN: CPT
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
## 2024-01-17 ENCOUNTER — APPOINTMENT (OUTPATIENT)
Dept: ORTHOPEDIC SURGERY | Facility: CLINIC | Age: 64
End: 2024-01-17
Payer: COMMERCIAL

## 2024-01-17 VITALS — HEIGHT: 63 IN | WEIGHT: 160 LBS | BODY MASS INDEX: 28.35 KG/M2

## 2024-01-17 DIAGNOSIS — M54.12 RADICULOPATHY, CERVICAL REGION: ICD-10-CM

## 2024-01-17 DIAGNOSIS — M47.816 SPONDYLOSIS W/OUT MYELOPATHY OR RADICULOPATHY, LUMBAR REGION: ICD-10-CM

## 2024-01-17 DIAGNOSIS — Z86.39 PERSONAL HISTORY OF OTHER ENDOCRINE, NUTRITIONAL AND METABOLIC DISEASE: ICD-10-CM

## 2024-01-17 DIAGNOSIS — M51.9 UNSPECIFIED THORACIC, THORACOLUMBAR AND LUMBOSACRAL INTERVERTEBRAL DISC DISORDER: ICD-10-CM

## 2024-01-17 DIAGNOSIS — M54.50 LOW BACK PAIN, UNSPECIFIED: ICD-10-CM

## 2024-01-17 DIAGNOSIS — M47.812 SPONDYLOSIS W/OUT MYELOPATHY OR RADICULOPATHY, CERVICAL REGION: ICD-10-CM

## 2024-01-17 DIAGNOSIS — M54.16 RADICULOPATHY, LUMBAR REGION: ICD-10-CM

## 2024-01-17 PROCEDURE — 99213 OFFICE O/P EST LOW 20 MIN: CPT

## 2024-01-17 PROCEDURE — ZZZZZ: CPT

## 2024-01-18 NOTE — DISCUSSION/SUMMARY
[de-identified] : reviwed the case and the imaging with her  prior neck surgery  no indication for further or revision surgery  tylenol/heat as needed fu with dr sahu for the right shoulder pain management follow-up as needed

## 2024-02-21 ENCOUNTER — APPOINTMENT (OUTPATIENT)
Dept: ORTHOPEDIC SURGERY | Facility: CLINIC | Age: 64
End: 2024-02-21
Payer: COMMERCIAL

## 2024-02-21 VITALS — HEIGHT: 63 IN | BODY MASS INDEX: 28.35 KG/M2 | WEIGHT: 160 LBS

## 2024-02-21 PROCEDURE — 99214 OFFICE O/P EST MOD 30 MIN: CPT

## 2024-02-22 ENCOUNTER — APPOINTMENT (OUTPATIENT)
Dept: MRI IMAGING | Facility: CLINIC | Age: 64
End: 2024-02-22
Payer: COMMERCIAL

## 2024-02-22 PROCEDURE — 73221 MRI JOINT UPR EXTREM W/O DYE: CPT | Mod: RT

## 2024-02-28 ENCOUNTER — APPOINTMENT (OUTPATIENT)
Dept: ORTHOPEDIC SURGERY | Facility: CLINIC | Age: 64
End: 2024-02-28
Payer: COMMERCIAL

## 2024-02-28 VITALS — HEIGHT: 63 IN | WEIGHT: 160 LBS | BODY MASS INDEX: 28.35 KG/M2

## 2024-02-28 PROCEDURE — 73010 X-RAY EXAM OF SHOULDER BLADE: CPT | Mod: RT

## 2024-02-28 PROCEDURE — 73030 X-RAY EXAM OF SHOULDER: CPT | Mod: RT

## 2024-02-28 PROCEDURE — 99214 OFFICE O/P EST MOD 30 MIN: CPT
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
## 2024-02-28 NOTE — DATA REVIEWED
[FreeTextEntry1] : MRI R SHOULDER [facility] 2/22/24:  There is a limited partial intrasubstance tear. There is minor biceps fluid. The muscle is decent. There are slight AC changes. The GH joint seems ok.   MRI R [facility]OULD[facility] 9/8/23:  There are no major cuff tears There are clear AC changes. There is slight biceps fluid. There is a possible partial subscapularis tear. The muscle is decent.
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
## 2024-02-28 NOTE — PHYSICAL EXAM
[Right] : right shoulder [Moderate] : moderate [5 ___] : forward flexion 5[unfilled]/5 [5___] : external rotation 5[unfilled]/5 [Sitting] : sitting [Left] : left shoulder [] : no sensory deficits [FreeTextEntry9] : /60/L1. [TWNoteComboBox4] : passive forward flexion 150 degrees [TWNoteComboBox6] : internal rotation L2 [de-identified] : external rotation 60 degrees

## 2024-02-28 NOTE — IMAGING
[Right] : right shoulder [FreeTextEntry1] : The GH is OK. There are some AC changes. [FreeTextEntry5] : There is a Type II acromion with a small spur.

## 2024-02-28 NOTE — ASSESSMENT
[FreeTextEntry1] : She still wishes to proceed with surgery. This would include a right shoulder arthroscopy, debridement, synovectomy, lysis of adhesions, decompression, distal clavicle resection, possible repair, possible biceps surgery.   Her issues with stiffness were reviewed.   PT timing post-op noted.   Surgery is scheduled for 3/26/24.   Patient was seen by Dr. Mandeep Chand. Patient was seen by Estrellita CALVERT under the supervision of Dr. Mandeep Chand. Progress note was completed by Estrellita CALVERT. Entered by Elena Antonio acting as scribe.

## 2024-03-20 ENCOUNTER — APPOINTMENT (OUTPATIENT)
Dept: ORTHOPEDIC SURGERY | Facility: CLINIC | Age: 64
End: 2024-03-20
Payer: COMMERCIAL

## 2024-03-20 PROCEDURE — L3670: CPT | Mod: RT

## 2024-03-21 ENCOUNTER — OUTPATIENT (OUTPATIENT)
Dept: OUTPATIENT SERVICES | Facility: HOSPITAL | Age: 64
LOS: 1 days | End: 2024-03-21

## 2024-03-21 VITALS
DIASTOLIC BLOOD PRESSURE: 70 MMHG | HEART RATE: 70 BPM | HEIGHT: 63 IN | RESPIRATION RATE: 18 BRPM | TEMPERATURE: 98 F | SYSTOLIC BLOOD PRESSURE: 106 MMHG | WEIGHT: 164.02 LBS | OXYGEN SATURATION: 98 %

## 2024-03-21 DIAGNOSIS — M75.41 IMPINGEMENT SYNDROME OF RIGHT SHOULDER: ICD-10-CM

## 2024-03-21 DIAGNOSIS — Z98.890 OTHER SPECIFIED POSTPROCEDURAL STATES: Chronic | ICD-10-CM

## 2024-03-21 DIAGNOSIS — Z98.891 HISTORY OF UTERINE SCAR FROM PREVIOUS SURGERY: Chronic | ICD-10-CM

## 2024-03-21 NOTE — H&P PST ADULT - LOCATION OF BACK PAIN
Chronic low back pain- hx of lumbar herniated disc- has steroids injections and Lumbar nerve Ablation-October 2023

## 2024-03-21 NOTE — H&P PST ADULT - NSICDXPASTSURGICALHX_GEN_ALL_CORE_FT
PAST SURGICAL HISTORY:  H/O  section     H/O nasal septoplasty     H/O radiofrequency ablation (RFA) of nerve of lumbar spine     S/P arthroscopy of left shoulder     S/P cervical disc replacement

## 2024-03-21 NOTE — H&P PST ADULT - FUNCTIONAL STATUS
METS 6.70- DASI- Activities limited due to right arm pain./4-10 METS METS 6.70- DASI- Activities limited due to right shoulder pain./4-10 METS

## 2024-03-21 NOTE — H&P PST ADULT - NEGATIVE ENMT SYMPTOMS
Denies dentures. Denies loose teeth./no hearing difficulty/no ear pain/no tinnitus/no vertigo/no sinus symptoms/no nasal congestion/no nasal discharge/no gum bleeding/no dry mouth/no throat pain/no dysphagia

## 2024-03-21 NOTE — H&P PST ADULT - NEGATIVE NEUROLOGICAL SYMPTOMS
no transient paralysis/no weakness/no generalized seizures/no focal seizures/no syncope/no tremors/no vertigo/no loss of sensation/no headache

## 2024-03-21 NOTE — H&P PST ADULT - ATTENDING COMMENTS
To the best of my ability as an orthopaedic surgeon, I assert this. There are no differences orthopaedically.  Otherwise as per anesthesia and/or medicine.

## 2024-03-21 NOTE — H&P PST ADULT - PROBLEM SELECTOR PLAN 1
Patient tentatively scheduled for Right shoulder arthroscopy, debridement, synovectomy, lysis of adhesions, subacromial decompression, distal clavicle resection on 3/26/24.  Pre-op instructions provided. Pt given verbal and written instructions with teach back on chlorhexidine shampoo and pepcid. Pt verbalized understanding with return demonstration.     Copy of labs, Echocardiogram and Stress test requested.  Copy of EKG in chart

## 2024-03-21 NOTE — H&P PST ADULT - MUSCULOSKELETAL
right shoulder tender with palpation/normal/ROM intact/no joint swelling/no joint erythema/no joint warmth/no calf tenderness/decreased ROM due to pain/normal gait/strength 5/5 bilateral lower extremities/back exam/decreased strength details…
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
## 2024-03-22 RX ORDER — HYDROCODONE BITARTRATE AND ACETAMINOPHEN 7.5; 325 MG/1; MG/1
7.5-325 TABLET ORAL
Qty: 42 | Refills: 0 | Status: ACTIVE | COMMUNITY
Start: 2024-03-22 | End: 1900-01-01

## 2024-03-22 RX ORDER — GABAPENTIN 300 MG/1
300 CAPSULE ORAL 3 TIMES DAILY
Qty: 15 | Refills: 0 | Status: ACTIVE | COMMUNITY
Start: 2024-03-22 | End: 1900-01-01

## 2024-03-22 RX ORDER — KETOROLAC TROMETHAMINE 10 MG/1
10 TABLET, FILM COATED ORAL EVERY 6 HOURS
Qty: 20 | Refills: 0 | Status: ACTIVE | COMMUNITY
Start: 2024-03-22 | End: 1900-01-01

## 2024-03-25 ENCOUNTER — TRANSCRIPTION ENCOUNTER (OUTPATIENT)
Age: 64
End: 2024-03-25

## 2024-03-25 VITALS
HEART RATE: 78 BPM | TEMPERATURE: 98 F | WEIGHT: 164.02 LBS | OXYGEN SATURATION: 99 % | SYSTOLIC BLOOD PRESSURE: 123 MMHG | DIASTOLIC BLOOD PRESSURE: 70 MMHG | RESPIRATION RATE: 20 BRPM

## 2024-03-25 NOTE — ASU PREOPERATIVE ASSESSMENT, ADULT ([facility] ONLY) - PROCEDURE
right shoulder arthroscopy debridement, lysis of adhesions, subacromial decompression distal clavical resection

## 2024-03-26 ENCOUNTER — APPOINTMENT (OUTPATIENT)
Dept: ORTHOPEDIC SURGERY | Facility: AMBULATORY SURGERY CENTER | Age: 64
End: 2024-03-26
Payer: COMMERCIAL

## 2024-03-26 ENCOUNTER — TRANSCRIPTION ENCOUNTER (OUTPATIENT)
Age: 64
End: 2024-03-26

## 2024-03-26 ENCOUNTER — OUTPATIENT (OUTPATIENT)
Dept: OUTPATIENT SERVICES | Facility: HOSPITAL | Age: 64
LOS: 1 days | Discharge: HOME | End: 2024-03-26

## 2024-03-26 VITALS
HEART RATE: 76 BPM | SYSTOLIC BLOOD PRESSURE: 118 MMHG | RESPIRATION RATE: 16 BRPM | DIASTOLIC BLOOD PRESSURE: 69 MMHG | OXYGEN SATURATION: 97 %

## 2024-03-26 DIAGNOSIS — Z98.890 OTHER SPECIFIED POSTPROCEDURAL STATES: Chronic | ICD-10-CM

## 2024-03-26 DIAGNOSIS — M75.41 IMPINGEMENT SYNDROME OF RIGHT SHOULDER: ICD-10-CM

## 2024-03-26 DIAGNOSIS — Z98.891 HISTORY OF UTERINE SCAR FROM PREVIOUS SURGERY: Chronic | ICD-10-CM

## 2024-03-26 PROCEDURE — 29826 SHO ARTHRS SRG DECOMPRESSION: CPT | Mod: RT

## 2024-03-26 PROCEDURE — 29824 SHO ARTHRS SRG DSTL CLAVICLC: CPT | Mod: 59,RT

## 2024-03-26 PROCEDURE — 29823 SHO ARTHRS SRG XTNSV DBRDMT: CPT | Mod: 59,RT

## 2024-03-26 PROCEDURE — 29828 SHO ARTHRS SRG BICP TENODSIS: CPT | Mod: RT

## 2024-03-26 NOTE — ASU DISCHARGE PLAN (ADULT/PEDIATRIC) - CARE PROVIDER_API CALL
Mandeep Chand  Orthopaedic Surgery  [address] [place] [id number]  Phone: [phone]  Fax: [phone]  Follow Up Time: 2 weeks

## 2024-03-26 NOTE — ASU DISCHARGE PLAN (ADULT/PEDIATRIC) - NS MD DC FALL RISK RISK
For information on Fall & Injury Prevention, visit: [email or web address] OR  [email or web address] OR  https://www.cdc.gov/steadi/patient.html

## 2024-03-26 NOTE — ASU DISCHARGE PLAN (ADULT/PEDIATRIC) - ASU DC SPECIAL INSTRUCTIONSFT
Please follow the instructions provided to you by Dr. Chand regarding post operative care and follow up.  If you were told that you would be taking prescribed pain control (i.e. opiates/narcotics) they have been sent to your pharmacy.  If not then please take over the counter pain medication (i.e. Tylenol/Advil) as instructed on the labels

## 2024-04-01 PROBLEM — S43.51XA SPRAIN OF RIGHT ACROMIOCLAVICULAR LIGAMENT, INITIAL ENCOUNTER: Status: ACTIVE | Noted: 2023-09-20

## 2024-04-03 ENCOUNTER — APPOINTMENT (OUTPATIENT)
Dept: ORTHOPEDIC SURGERY | Facility: CLINIC | Age: 64
End: 2024-04-03
Payer: COMMERCIAL

## 2024-04-03 DIAGNOSIS — S43.51XA SPRAIN OF RIGHT ACROMIOCLAVICULAR JOINT, INITIAL ENCOUNTER: ICD-10-CM

## 2024-04-03 PROBLEM — M19.90 UNSPECIFIED OSTEOARTHRITIS, UNSPECIFIED SITE: Chronic | Status: ACTIVE | Noted: 2024-03-21

## 2024-04-03 PROBLEM — M75.102 UNSPECIFIED ROTATOR CUFF TEAR OR RUPTURE OF LEFT SHOULDER, NOT SPECIFIED AS TRAUMATIC: Chronic | Status: ACTIVE | Noted: 2024-03-21

## 2024-04-03 PROBLEM — Z87.19 PERSONAL HISTORY OF OTHER DISEASES OF THE DIGESTIVE SYSTEM: Chronic | Status: ACTIVE | Noted: 2024-03-21

## 2024-04-03 PROBLEM — M75.41 IMPINGEMENT SYNDROME OF RIGHT SHOULDER: Chronic | Status: ACTIVE | Noted: 2024-03-21

## 2024-04-03 PROBLEM — M51.26 OTHER INTERVERTEBRAL DISC DISPLACEMENT, LUMBAR REGION: Chronic | Status: ACTIVE | Noted: 2024-03-21

## 2024-04-03 PROBLEM — S02.2XXA FRACTURE OF NASAL BONES, INITIAL ENCOUNTER FOR CLOSED FRACTURE: Chronic | Status: ACTIVE | Noted: 2024-03-21
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
## 2024-04-03 PROBLEM — I77.89 OTHER SPECIFIED DISORDERS OF ARTERIES AND ARTERIOLES: Chronic | Status: ACTIVE | Noted: 2024-03-21

## 2024-04-03 PROBLEM — G54.2 CERVICAL ROOT DISORDERS, NOT ELSEWHERE CLASSIFIED: Chronic | Status: ACTIVE | Noted: 2024-03-21

## 2024-04-03 PROCEDURE — 99024 POSTOP FOLLOW-UP VISIT: CPT

## 2024-04-03 PROCEDURE — 73030 X-RAY EXAM OF SHOULDER: CPT | Mod: RT

## 2024-04-03 RX ORDER — METHYLPREDNISOLONE 4 MG/1
4 TABLET ORAL
Qty: 1 | Refills: 0 | Status: ACTIVE | COMMUNITY
Start: 2024-04-03 | End: 1900-01-01

## 2024-04-03 NOTE — PHYSICAL EXAM
[Right] : right shoulder [Supine] : supine [Severe] : severe [] : no sensory deficits [Moderate] : moderate [TWNoteComboBox4] : passive forward flexion 90 degrees [de-identified] : Range of motion was not assessed. [de-identified] : external rotation 20 degrees
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
## 2024-04-03 NOTE — ASSESSMENT
[FreeTextEntry1] : We reviewed the scope pictures. PT outlined. Sling use outlined. Caution discussed. Premedicating reviewed. Questions answered.  Patient was seen by Dr. Mandeep Chand. Patient was seen by Estrellita CALVERT under the supervision of Dr. Mandeep Chand. Progress note was completed by Estrellita CALVERT.

## 2024-04-05 ENCOUNTER — INPATIENT (INPATIENT)
Facility: HOSPITAL | Age: 64
LOS: 5 days | Discharge: HOME | DRG: 392 | End: 2024-04-11
Attending: INTERNAL MEDICINE | Admitting: INTERNAL MEDICINE
Payer: COMMERCIAL

## 2024-04-05 VITALS
TEMPERATURE: 97 F | SYSTOLIC BLOOD PRESSURE: 133 MMHG | HEIGHT: 63 IN | OXYGEN SATURATION: 97 % | HEART RATE: 83 BPM | RESPIRATION RATE: 18 BRPM | WEIGHT: 160.06 LBS | DIASTOLIC BLOOD PRESSURE: 82 MMHG

## 2024-04-05 DIAGNOSIS — Z98.890 OTHER SPECIFIED POSTPROCEDURAL STATES: Chronic | ICD-10-CM

## 2024-04-05 DIAGNOSIS — Z98.891 HISTORY OF UTERINE SCAR FROM PREVIOUS SURGERY: Chronic | ICD-10-CM

## 2024-04-05 LAB
ALBUMIN SERPL ELPH-MCNC: 3.7 G/DL — SIGNIFICANT CHANGE UP (ref 3.3–5)
ALP SERPL-CCNC: 76 U/L — SIGNIFICANT CHANGE UP (ref 40–120)
ALT FLD-CCNC: 22 U/L — SIGNIFICANT CHANGE UP (ref 12–78)
ANION GAP SERPL CALC-SCNC: 11 MMOL/L — SIGNIFICANT CHANGE UP (ref 5–17)
AST SERPL-CCNC: 20 U/L — SIGNIFICANT CHANGE UP (ref 15–37)
BASOPHILS # BLD AUTO: 0 K/UL — SIGNIFICANT CHANGE UP (ref 0–0.2)
BASOPHILS NFR BLD AUTO: 0 % — SIGNIFICANT CHANGE UP (ref 0–2)
BILIRUB SERPL-MCNC: 0.4 MG/DL — SIGNIFICANT CHANGE UP (ref 0.2–1.2)
BUN SERPL-MCNC: 17 MG/DL — SIGNIFICANT CHANGE UP (ref 7–23)
CALCIUM SERPL-MCNC: 9.2 MG/DL — SIGNIFICANT CHANGE UP (ref 8.5–10.1)
CHLORIDE SERPL-SCNC: 101 MMOL/L — SIGNIFICANT CHANGE UP (ref 96–108)
CO2 SERPL-SCNC: 26 MMOL/L — SIGNIFICANT CHANGE UP (ref 22–31)
CREAT SERPL-MCNC: 0.75 MG/DL — SIGNIFICANT CHANGE UP (ref 0.5–1.3)
EGFR: 91 ML/MIN/1.73M2 — SIGNIFICANT CHANGE UP
EOSINOPHIL # BLD AUTO: 0 K/UL — SIGNIFICANT CHANGE UP (ref 0–0.5)
EOSINOPHIL NFR BLD AUTO: 0 % — SIGNIFICANT CHANGE UP (ref 0–6)
GLUCOSE SERPL-MCNC: 113 MG/DL — HIGH (ref 70–99)
HCT VFR BLD CALC: 41.9 % — SIGNIFICANT CHANGE UP (ref 34.5–45)
HGB BLD-MCNC: 13.9 G/DL — SIGNIFICANT CHANGE UP (ref 11.5–15.5)
LACTATE SERPL-SCNC: 1.1 MMOL/L — SIGNIFICANT CHANGE UP (ref 0.7–2)
LYMPHOCYTES # BLD AUTO: 10 % — LOW (ref 13–44)
LYMPHOCYTES # BLD AUTO: 2.34 K/UL — SIGNIFICANT CHANGE UP (ref 1–3.3)
MCHC RBC-ENTMCNC: 30.7 PG — SIGNIFICANT CHANGE UP (ref 27–34)
MCHC RBC-ENTMCNC: 33.2 GM/DL — SIGNIFICANT CHANGE UP (ref 32–36)
MCV RBC AUTO: 92.5 FL — SIGNIFICANT CHANGE UP (ref 80–100)
MONOCYTES # BLD AUTO: 1.41 K/UL — HIGH (ref 0–0.9)
MONOCYTES NFR BLD AUTO: 6 % — SIGNIFICANT CHANGE UP (ref 2–14)
NEUTROPHILS # BLD AUTO: 19.68 K/UL — HIGH (ref 1.8–7.4)
NEUTROPHILS NFR BLD AUTO: 83 % — HIGH (ref 43–77)
NRBC # BLD: SIGNIFICANT CHANGE UP /100 WBCS (ref 0–0)
PLATELET # BLD AUTO: 399 K/UL — SIGNIFICANT CHANGE UP (ref 150–400)
POTASSIUM SERPL-MCNC: 3.4 MMOL/L — LOW (ref 3.5–5.3)
POTASSIUM SERPL-SCNC: 3.4 MMOL/L — LOW (ref 3.5–5.3)
PROT SERPL-MCNC: 8.1 G/DL — SIGNIFICANT CHANGE UP (ref 6–8.3)
RBC # BLD: 4.53 M/UL — SIGNIFICANT CHANGE UP (ref 3.8–5.2)
RBC # FLD: 12.1 % — SIGNIFICANT CHANGE UP (ref 10.3–14.5)
SODIUM SERPL-SCNC: 138 MMOL/L — SIGNIFICANT CHANGE UP (ref 135–145)
WBC # BLD: 23.43 K/UL — HIGH (ref 3.8–10.5)
WBC # FLD AUTO: 23.43 K/UL — HIGH (ref 3.8–10.5)

## 2024-04-05 PROCEDURE — 74177 CT ABD & PELVIS W/CONTRAST: CPT | Mod: 26,MC

## 2024-04-05 PROCEDURE — 99285 EMERGENCY DEPT VISIT HI MDM: CPT

## 2024-04-05 RX ORDER — METRONIDAZOLE 500 MG
500 TABLET ORAL ONCE
Refills: 0 | Status: COMPLETED | OUTPATIENT
Start: 2024-04-05 | End: 2024-04-05

## 2024-04-05 RX ORDER — MORPHINE SULFATE 50 MG/1
4 CAPSULE, EXTENDED RELEASE ORAL ONCE
Refills: 0 | Status: DISCONTINUED | OUTPATIENT
Start: 2024-04-05 | End: 2024-04-05

## 2024-04-05 RX ORDER — MORPHINE SULFATE 50 MG/1
2 CAPSULE, EXTENDED RELEASE ORAL ONCE
Refills: 0 | Status: DISCONTINUED | OUTPATIENT
Start: 2024-04-05 | End: 2024-04-05

## 2024-04-05 RX ORDER — GLYCERIN ADULT
1 SUPPOSITORY, RECTAL RECTAL ONCE
Refills: 0 | Status: COMPLETED | OUTPATIENT
Start: 2024-04-05 | End: 2024-04-05

## 2024-04-05 RX ORDER — SODIUM CHLORIDE 9 MG/ML
1000 INJECTION INTRAMUSCULAR; INTRAVENOUS; SUBCUTANEOUS ONCE
Refills: 0 | Status: COMPLETED | OUTPATIENT
Start: 2024-04-05 | End: 2024-04-05

## 2024-04-05 RX ORDER — ONDANSETRON 8 MG/1
4 TABLET, FILM COATED ORAL ONCE
Refills: 0 | Status: COMPLETED | OUTPATIENT
Start: 2024-04-05 | End: 2024-04-05

## 2024-04-05 RX ORDER — SOD SULF/SODIUM/NAHCO3/KCL/PEG
1 SOLUTION, RECONSTITUTED, ORAL ORAL
Qty: 1 | Refills: 0
Start: 2024-04-05 | End: 2024-04-05

## 2024-04-05 RX ORDER — ERTAPENEM SODIUM 1 G/1
1000 INJECTION, POWDER, LYOPHILIZED, FOR SOLUTION INTRAMUSCULAR; INTRAVENOUS ONCE
Refills: 0 | Status: DISCONTINUED | OUTPATIENT
Start: 2024-04-05 | End: 2024-04-05

## 2024-04-05 RX ADMIN — SODIUM CHLORIDE 1000 MILLILITER(S): 9 INJECTION INTRAMUSCULAR; INTRAVENOUS; SUBCUTANEOUS at 18:41

## 2024-04-05 RX ADMIN — Medication 100 MILLIGRAM(S): at 21:42

## 2024-04-05 RX ADMIN — SODIUM CHLORIDE 1000 MILLILITER(S): 9 INJECTION INTRAMUSCULAR; INTRAVENOUS; SUBCUTANEOUS at 21:42

## 2024-04-05 RX ADMIN — MORPHINE SULFATE 4 MILLIGRAM(S): 50 CAPSULE, EXTENDED RELEASE ORAL at 21:42

## 2024-04-05 RX ADMIN — Medication 1 SUPPOSITORY(S): at 22:06

## 2024-04-05 RX ADMIN — ONDANSETRON 4 MILLIGRAM(S): 8 TABLET, FILM COATED ORAL at 18:39

## 2024-04-05 RX ADMIN — MORPHINE SULFATE 2 MILLIGRAM(S): 50 CAPSULE, EXTENDED RELEASE ORAL at 18:39

## 2024-04-05 NOTE — CONSULT NOTE ADULT - NS ATTEND AMEND GEN_ALL_CORE FT
62 yo fem who hasn't had any Bms since she had shoulder surgery 10 days ago c/o lower abd pain, had been taking opioids for pain control. CT Abd showing stool impaction at the level of rectosigmoid. She had a normal recent colonoscopy she said    Abd mild LLQ tend, no rebound, soft    62 yo fem constipation/stool impaction  -GI consult  -PO laxatives

## 2024-04-05 NOTE — ED ADULT TRIAGE NOTE - CHIEF COMPLAINT QUOTE
abdominal pain, vomiting, no BM since shoulder surgery April 26 abdominal pain, vomiting, no BM since shoulder surgery March 26
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
## 2024-04-05 NOTE — ED ADULT NURSE NOTE - NSFALLRISKFACTORS_ED_ALL_ED
shoulder sx march 26/Surgery: Recent surgery, recent lower limb amputation, major abdominal or thoracic surgery

## 2024-04-05 NOTE — ED PROVIDER NOTE - CLINICAL SUMMARY MEDICAL DECISION MAKING FREE TEXT BOX
63 y female with history of recent shoulder surgery, presents to ED with complaint of lower abdominal pain and vomiting for 1 day, and constipation for 10 days, patient has been taking multiple opiate agents for pain management after surgery.     Plan- Labs, CT abdomen, Fluids, Pain management, anti-emetic. Patient is a 63-year-old female who presents to the emergency room with a chief complaint of abdominal pain.  Past medical history of hypothyroidism endometriosis status post multiple laparoscopic procedures IBS, history of left rotator cuff tear history of impingement syndrome of the right shoulder.  Patient recently underwent right shoulder arthroscopy debridement and lysis of adhesions.  She also had a distal clavicle resection.  Reports the procedure was done on March 26.  She was sent home with narcotic pain medication for the pain and has been in the sling.  States she has been experiencing abdominal pain for the last week.  Was trying Colace laxatives with no relief yesterday the pain intensified and today it was severe stabbing and sharp.  She reports no bowel movement since surgery 10 days ago.  She also had 2 episodes of vomiting today no vomiting currently.  Denies any fevers chills chest pain or shortness of breath.  On exam patient is lying in bed no acute distress normocephalic atraumatic pupils are equal round and reactive hearts regular rate lungs clear to auscultation abdomen soft with tenderness palpation the lower abdominal field.  Hypoactive bowel sounds are noted.  Patient presenting to the emergency room with abdominal pain.  Differential includes but not limited to constipation versus colitis versus diverticulitis versus IBS flare.  Will obtain screening labs CT imaging of the abdomen pelvis will hydrate medicate as needed for pain and monitor. CT imaging reveals moderate mural thickening of the rectosigmoid colon with upstream dilation of the colon diffusely.  Differential includes moderate proctitis with ileus/partial obstruction however underlying lesion cannot be ruled out.  GI consulted recommending glycerin suppository and GoLytely.  Believes the white count is elevated as a stress reaction.  Also recommending Flagyl.  If patient feels comfortable can be discharged with outpatient follow-up.  Despite pain medication observation patient continues to experience pain and does not feel comfortable going home.  Surgery is consulted will admit the patient for further workup and management.      63 y female with history of recent shoulder surgery, presents to ED with complaint of lower abdominal pain and vomiting for 1 day, and constipation for 10 days, patient has been taking multiple opiate agents for pain management after surgery.     Plan- Labs, CT abdomen, Fluids, Pain management, anti-emetic.

## 2024-04-05 NOTE — ED ADULT NURSE REASSESSMENT NOTE - NS ED NURSE REASSESS COMMENT FT1
Received pt from change of shift to 4A, pt is A+Ox3, calm and cooperative, able to move all extremities. C/o abdominal pain. Unlabored breathing at this time. No distress noted. MD evaluation in progress.

## 2024-04-05 NOTE — ED PROVIDER NOTE - DIFFERENTIAL DIAGNOSIS
Patient presenting to the emergency room with abdominal pain.  Differential includes but not limited to constipation versus colitis versus diverticulitis versus IBS flare.  Will obtain screening labs CT imaging of the abdomen pelvis will hydrate medicate as needed for pain and monitor. Differential Diagnosis

## 2024-04-05 NOTE — ED ADULT NURSE NOTE - NSFALLUNIVINTERV_ED_ALL_ED
Bed/Stretcher in lowest position, wheels locked, appropriate side rails in place/Call bell, personal items and telephone in reach/Instruct patient to call for assistance before getting out of bed/chair/stretcher/Non-slip footwear applied when patient is off stretcher/[facility] to call system/Physically safe environment - no spills, clutter or unnecessary equipment/Purposeful proactive rounding/Room/bathroom lighting operational, light cord in reach

## 2024-04-05 NOTE — ED PROVIDER NOTE - WHICH SHOWED
CT imaging reveals moderate mural thickening of the rectosigmoid colon with upstream dilation of the colon diffusely.  Differential includes moderate proctitis with ileus/partial obstruction however underlying lesion cannot be ruled out.

## 2024-04-05 NOTE — ED PROVIDER NOTE - CHPI ED SYMPTOMS POS
CONSTIPATION/PAIN/VOMITING Render In Strict Bullet Format?: No Detail Level: Zone Plan: Consider oral low dose doxy if needed and/or finacea. Initiate Treatment: Soolantra daily

## 2024-04-05 NOTE — ED PROVIDER NOTE - PHYSICAL EXAMINATION
Abd- Soft, mildly distended, diffusely tender with increased tenderness to LLQ, bowel sounds hypoactive in all fields.

## 2024-04-06 DIAGNOSIS — R10.9 UNSPECIFIED ABDOMINAL PAIN: ICD-10-CM

## 2024-04-06 LAB
APPEARANCE UR: CLEAR — SIGNIFICANT CHANGE UP
BILIRUB UR-MCNC: NEGATIVE — SIGNIFICANT CHANGE UP
COLOR SPEC: YELLOW — SIGNIFICANT CHANGE UP
DIFF PNL FLD: NEGATIVE — SIGNIFICANT CHANGE UP
GLUCOSE UR QL: NEGATIVE MG/DL — SIGNIFICANT CHANGE UP
KETONES UR-MCNC: NEGATIVE MG/DL — SIGNIFICANT CHANGE UP
LEUKOCYTE ESTERASE UR-ACNC: ABNORMAL
NITRITE UR-MCNC: NEGATIVE — SIGNIFICANT CHANGE UP
PH UR: 6 — SIGNIFICANT CHANGE UP (ref 5–8)
PROT UR-MCNC: NEGATIVE MG/DL — SIGNIFICANT CHANGE UP
SP GR SPEC: 1.02 — SIGNIFICANT CHANGE UP (ref 1–1.03)
UROBILINOGEN FLD QL: 0.2 MG/DL — SIGNIFICANT CHANGE UP (ref 0.2–1)

## 2024-04-06 PROCEDURE — 93010 ELECTROCARDIOGRAM REPORT: CPT

## 2024-04-06 RX ORDER — SENNA PLUS 8.6 MG/1
1 TABLET ORAL AT BEDTIME
Refills: 0 | Status: DISCONTINUED | OUTPATIENT
Start: 2024-04-06 | End: 2024-04-11

## 2024-04-06 RX ORDER — MORPHINE SULFATE 50 MG/1
4 CAPSULE, EXTENDED RELEASE ORAL ONCE
Refills: 0 | Status: DISCONTINUED | OUTPATIENT
Start: 2024-04-06 | End: 2024-04-06

## 2024-04-06 RX ORDER — LEVOTHYROXINE SODIUM 125 MCG
100 TABLET ORAL DAILY
Refills: 0 | Status: DISCONTINUED | OUTPATIENT
Start: 2024-04-06 | End: 2024-04-11

## 2024-04-06 RX ORDER — SOD SULF/SODIUM/NAHCO3/KCL/PEG
4000 SOLUTION, RECONSTITUTED, ORAL ORAL ONCE
Refills: 0 | Status: COMPLETED | OUTPATIENT
Start: 2024-04-06 | End: 2024-04-06

## 2024-04-06 RX ORDER — AZTREONAM 2 G
1000 VIAL (EA) INJECTION EVERY 8 HOURS
Refills: 0 | Status: DISCONTINUED | OUTPATIENT
Start: 2024-04-06 | End: 2024-04-06

## 2024-04-06 RX ORDER — LACTULOSE 10 G/15ML
10 SOLUTION ORAL EVERY 6 HOURS
Refills: 0 | Status: DISCONTINUED | OUTPATIENT
Start: 2024-04-06 | End: 2024-04-11

## 2024-04-06 RX ORDER — ONDANSETRON 8 MG/1
4 TABLET, FILM COATED ORAL ONCE
Refills: 0 | Status: COMPLETED | OUTPATIENT
Start: 2024-04-06 | End: 2024-04-06

## 2024-04-06 RX ORDER — ACETAMINOPHEN 500 MG
650 TABLET ORAL EVERY 6 HOURS
Refills: 0 | Status: DISCONTINUED | OUTPATIENT
Start: 2024-04-06 | End: 2024-04-06

## 2024-04-06 RX ORDER — POLYETHYLENE GLYCOL 3350 17 G/17G
17 POWDER, FOR SOLUTION ORAL DAILY
Refills: 0 | Status: DISCONTINUED | OUTPATIENT
Start: 2024-04-06 | End: 2024-04-11

## 2024-04-06 RX ORDER — LACTULOSE 10 G/15ML
30 SOLUTION ORAL
Refills: 0 | Status: DISCONTINUED | OUTPATIENT
Start: 2024-04-06 | End: 2024-04-06

## 2024-04-06 RX ORDER — POTASSIUM CHLORIDE 20 MEQ
10 PACKET (EA) ORAL ONCE
Refills: 0 | Status: COMPLETED | OUTPATIENT
Start: 2024-04-06 | End: 2024-04-06

## 2024-04-06 RX ORDER — METRONIDAZOLE 500 MG
500 TABLET ORAL EVERY 8 HOURS
Refills: 0 | Status: DISCONTINUED | OUTPATIENT
Start: 2024-04-06 | End: 2024-04-06

## 2024-04-06 RX ORDER — DEXTROSE MONOHYDRATE, SODIUM CHLORIDE, AND POTASSIUM CHLORIDE 50; .745; 4.5 G/1000ML; G/1000ML; G/1000ML
1000 INJECTION, SOLUTION INTRAVENOUS
Refills: 0 | Status: DISCONTINUED | OUTPATIENT
Start: 2024-04-06 | End: 2024-04-07

## 2024-04-06 RX ORDER — INFLUENZA VIRUS VACCINE 15; 15; 15; 15 UG/.5ML; UG/.5ML; UG/.5ML; UG/.5ML
0.5 SUSPENSION INTRAMUSCULAR ONCE
Refills: 0 | Status: DISCONTINUED | OUTPATIENT
Start: 2024-04-06 | End: 2024-04-11

## 2024-04-06 RX ORDER — NALOXEGOL OXALATE 12.5 MG/1
25 TABLET, FILM COATED ORAL ONCE
Refills: 0 | Status: COMPLETED | OUTPATIENT
Start: 2024-04-06 | End: 2024-04-06

## 2024-04-06 RX ORDER — ACETAMINOPHEN 500 MG
1000 TABLET ORAL ONCE
Refills: 0 | Status: COMPLETED | OUTPATIENT
Start: 2024-04-06 | End: 2024-04-06

## 2024-04-06 RX ORDER — LEVOTHYROXINE SODIUM 125 MCG
1 TABLET ORAL
Refills: 0 | DISCHARGE

## 2024-04-06 RX ORDER — GLYCERIN ADULT
1 SUPPOSITORY, RECTAL RECTAL ONCE
Refills: 0 | Status: COMPLETED | OUTPATIENT
Start: 2024-04-06 | End: 2024-04-06

## 2024-04-06 RX ORDER — ACETAMINOPHEN 500 MG
650 TABLET ORAL EVERY 6 HOURS
Refills: 0 | Status: DISCONTINUED | OUTPATIENT
Start: 2024-04-06 | End: 2024-04-11

## 2024-04-06 RX ADMIN — DEXTROSE MONOHYDRATE, SODIUM CHLORIDE, AND POTASSIUM CHLORIDE 75 MILLILITER(S): 50; .745; 4.5 INJECTION, SOLUTION INTRAVENOUS at 20:46

## 2024-04-06 RX ADMIN — ONDANSETRON 4 MILLIGRAM(S): 8 TABLET, FILM COATED ORAL at 20:28

## 2024-04-06 RX ADMIN — Medication 400 MILLIGRAM(S): at 23:36

## 2024-04-06 RX ADMIN — Medication 4000 MILLILITER(S): at 01:32

## 2024-04-06 RX ADMIN — POLYETHYLENE GLYCOL 3350 17 GRAM(S): 17 POWDER, FOR SOLUTION ORAL at 14:04

## 2024-04-06 RX ADMIN — MORPHINE SULFATE 4 MILLIGRAM(S): 50 CAPSULE, EXTENDED RELEASE ORAL at 06:23

## 2024-04-06 RX ADMIN — LACTULOSE 30 GRAM(S): 10 SOLUTION ORAL at 17:30

## 2024-04-06 RX ADMIN — Medication 1 SUPPOSITORY(S): at 09:39

## 2024-04-06 RX ADMIN — Medication 30 MILLILITER(S): at 19:56

## 2024-04-06 RX ADMIN — Medication 1 ENEMA: at 20:47

## 2024-04-06 RX ADMIN — ONDANSETRON 4 MILLIGRAM(S): 8 TABLET, FILM COATED ORAL at 02:24

## 2024-04-06 RX ADMIN — Medication 100 MILLIGRAM(S): at 09:38

## 2024-04-06 RX ADMIN — MORPHINE SULFATE 4 MILLIGRAM(S): 50 CAPSULE, EXTENDED RELEASE ORAL at 02:05

## 2024-04-06 RX ADMIN — Medication 100 MILLIEQUIVALENT(S): at 01:32

## 2024-04-06 RX ADMIN — Medication 50 MILLIGRAM(S): at 09:38

## 2024-04-06 RX ADMIN — Medication 20 MILLIGRAM(S): at 01:32

## 2024-04-06 RX ADMIN — SENNA PLUS 1 TABLET(S): 8.6 TABLET ORAL at 21:53

## 2024-04-06 RX ADMIN — NALOXEGOL OXALATE 25 MILLIGRAM(S): 12.5 TABLET, FILM COATED ORAL at 09:38

## 2024-04-06 NOTE — CONSULT NOTE ADULT - ASSESSMENT
constipation  shoulder pain    plan  start bowel regimen  add lactulose 10cc q 6 hours and glycerin suppository  gas pill with simethicone and maalox q 6 hours  monitor stool output  add relistor
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
## 2024-04-06 NOTE — CHART NOTE - NSCHARTNOTEFT_GEN_A_CORE
RN called for pt with constipation.   Pt was seen by GI and surgery earlier.  Recommended to continue lactulose q6h, Maalox and glycerin suppository.  Pt received already the suppository in the afternoon with no BM.  Lactulose and Maalox ordered and continue to monitor.   ---------    RN called due to pt declining to take lactulose due to persistent nausea and new episodes of emesis. Pt also reports it causes headache. Instead of lactulose pt prefers to get an enema. Pt reports persistent abdominal pain.   Zofran 4mg IV x1 stat ordered.   Fleet enema x1 ordered now, pt did not respond to suppository.   Pt is NPO with persistent nausea and mild emesis.  IVF 75cc/h ordered x12h.   Ofirmev 1gr IV x1 now.     ----------  RN calls for pt with abdominal pain and nausea.   Pt received enema x1 and presented x1 small BM. After BM pt reported to RN sensation to pass, vitals were wnl SBP in 130's, no fever, no tachy, normal O2 sat.    Pt seen at bedside. Sleeping comfortable. Once asked her, she c/o severe abdominal pain despite she is  sleeping and comfortable. RN informs pain is better after Ofirmev.  On exam pt responds to verbal call and stimuli. abdomen distended, bowel sound + decreased; soft, tenderness over palpation in lower quadrants, no rebound, no guarding.   EKG: none performed during this admission. EKG ordered with NSR, no QT/QTC prolongation.   Continue Zofran prn.   Monitor symptoms, bowel movements; AM labs. RN called for pt with constipation.   Pt was seen by GI and surgery earlier.  Recommended to continue lactulose q6h, Maalox and glycerin suppository.  Pt received already the suppository in the afternoon with no BM.  Lactulose and Maalox ordered and continue to monitor.   ---------    RN called due to pt declining to take lactulose due to persistent nausea and new episodes of emesis. Pt also reports it causes headache. Instead of lactulose pt prefers to get an enema. Pt reports persistent abdominal pain.   Zofran 4mg IV x1 stat ordered.   Fleet enema x1 ordered now, pt did not respond to suppository.   Pt is NPO with persistent nausea and mild emesis.  NS with 20meq potassium at 75 cc/h ordered x12h.   Ofirmev 1gr IV x1 now.     ----------  RN calls for pt with abdominal pain and nausea.   Pt received enema x1 and presented x1 small BM. After BM pt reported to RN sensation to pass, vitals were wnl SBP in 130's, no fever, no tachy, normal O2 sat.    Pt sleeping comfortable, responds to verbal call and stimuli. Once asked her, she c/o severe abdominal pain despite she is sleeping and looks comfortable. RN informs pain looks better after Ofirmev.   Abdomen distended, bowel sound + decreased; soft, tenderness over palpation in lower quadrants, no rebound, no guarding.   EKG: none performed during this admission. EKG ordered with NSR, no QT/QTC prolongation.   Continue Zofran prn.   Monitor symptoms, bowel movements; AM labs. RN called for pt with constipation.   Pt was seen by GI and surgery earlier.  Recommended to continue lactulose q6h, Maalox and glycerin suppository.  Pt received already the suppository in the afternoon with no BM.  Lactulose and Maalox ordered and continue to monitor.   ---------    RN called due to pt declining to take lactulose due to persistent nausea and new episodes of emesis. Pt also reports it causes headache. Instead of lactulose pt prefers to get an enema. Pt reports persistent abdominal pain.   Zofran 4mg IV x1 stat ordered.   Fleet enema x1 ordered now, pt did not respond to suppository.   Pt is NPO with persistent nausea and mild emesis.  NS with 20meq potassium at 75 cc/h ordered x12h.   Ofirmev 1gr IV x1 now.     ----------  RN calls for pt with abdominal pain and nausea.   Pt received enema x1 and presented x1 small BM. After BM pt reported to RN sensation to pass out, vitals were wnl SBP in 130's, no fever, no tachy, normal O2 sat.    Pt sleeping comfortable, responds to verbal call and stimuli. Once asked her, she c/o severe abdominal pain despite she is sleeping and looks comfortable. RN informs pain looks better after Ofirmev.   Abdomen distended, bowel sound + decreased; soft, tenderness over palpation in lower quadrants, no rebound, no guarding.   EKG: none performed during this admission. EKG ordered with NSR, no QT/QTC prolongation.   Continue Zofran prn.   Monitor symptoms, bowel movements; AM labs.

## 2024-04-06 NOTE — H&P ADULT - ASSESSMENT
63 y female with PMHx of hypothyroidism, endometriosis (multiple laparoscopic procedures), enlarged aorta, IBS, recent shoulder surgery 10 days ago, presents to ED with complaint of lower abdominal pain and vomiting for 1 day. Patient states she has not had a bowel movement since surgery 10 days ago, has been taking Vicodin and Toradol for pain. Patient attempted laxative therapy but was not successful. Patient vomit x2 today, yellow in color. Patient denies fever, chest pain, or SOB.      Stercoliths sec to constipation  - cw Golytely  - GI and surgery following  - avoid opoids  - NPO for now  - sp fernanda  - will monitor     Hypothyroid  - cw synthroid     [place]

## 2024-04-06 NOTE — H&P ADULT - NSHPLABSRESULTS_GEN_ALL_CORE
Lab Results:  CBC  CBC Full  -  ( 05 Apr 2024 18:30 )  WBC Count : 23.43 K/uL  RBC Count : 4.53 M/uL  Hemoglobin : 13.9 g/dL  Hematocrit : 41.9 %  Platelet Count - Automated : 399 K/uL  Mean Cell Volume : 92.5 fl  Mean Cell Hemoglobin : 30.7 pg  Mean Cell Hemoglobin Concentration : 33.2 gm/dL  Auto Neutrophil # : 19.68 K/uL  Auto Lymphocyte # : 2.34 K/uL  Auto Monocyte # : 1.41 K/uL  Auto Eosinophil # : 0.00 K/uL  Auto Basophil # : 0.00 K/uL  Auto Neutrophil % : 83.0 %  Auto Lymphocyte % : 10.0 %  Auto Monocyte % : 6.0 %  Auto Eosinophil % : 0.0 %  Auto Basophil % : 0.0 %    .		Differential:	[] Automated		[] Manual  Chemistry                        13.9   23.43 )-----------( 399      ( 05 Apr 2024 18:30 )             41.9     04-05    138  |  101  |  17  ----------------------------<  113<H>  3.4<L>   |  26  |  0.75    Ca    9.2      05 Apr 2024 18:30    TPro  8.1  /  Alb  3.7  /  TBili  0.4  /  DBili  x   /  AST  20  /  ALT  22  /  AlkPhos  76  04-05    LIVER FUNCTIONS - ( 05 Apr 2024 18:30 )  Alb: 3.7 g/dL / Pro: 8.1 g/dL / ALK PHOS: 76 U/L / ALT: 22 U/L / AST: 20 U/L / GGT: x             Urinalysis Basic - ( 05 Apr 2024 18:30 )    Color: x / Appearance: x / SG: x / pH: x  Gluc: 113 mg/dL / Ketone: x  / Bili: x / Urobili: x   Blood: x / Protein: x / Nitrite: x   Leuk Esterase: x / RBC: x / WBC x   Sq Epi: x / Non Sq Epi: x / Bacteria: x            MICROBIOLOGY/CULTURES:      RADIOLOGY RESULTS:

## 2024-04-06 NOTE — H&P ADULT - NSHPPHYSICALEXAM_GEN_ALL_CORE
63 y female with PMHx of hypothyroidism, endometriosis (multiple laparoscopic procedures), enlarged aorta, IBS, recent shoulder surgery 10 days ago, presents to ED with complaint of lower abdominal pain and vomiting for 1 day. Patient states she has not had a bowel movement since surgery 10 days ago, has been taking Vicodin and Toradol for pain. Patient attempted laxative therapy but was not successful. Patient vomit x2 today, yellow in color. Patient denies fever, chest pain, or SOB.

## 2024-04-06 NOTE — CONSULT NOTE ADULT - ASSESSMENT
63 y female with PMHx of hypothyroidism, endometriosis (multiple laparoscopic procedures), enlarged aorta, IBS, recent shoulder surgery 10 days ago, presents to ED with complaint of lower abdominal pain, constipaiton and vomiting   constipation  ileus   leukocytosis -- reactive    plan  pt is nontoxic  ct abd noted  bowel regimen/ laxative   stop antibx
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
## 2024-04-07 LAB
ALBUMIN SERPL ELPH-MCNC: 2.7 G/DL — LOW (ref 3.3–5)
ALP SERPL-CCNC: 68 U/L — SIGNIFICANT CHANGE UP (ref 40–120)
ALT FLD-CCNC: 24 U/L — SIGNIFICANT CHANGE UP (ref 12–78)
ANION GAP SERPL CALC-SCNC: 10 MMOL/L — SIGNIFICANT CHANGE UP (ref 5–17)
AST SERPL-CCNC: 22 U/L — SIGNIFICANT CHANGE UP (ref 15–37)
BILIRUB SERPL-MCNC: 0.6 MG/DL — SIGNIFICANT CHANGE UP (ref 0.2–1.2)
BUN SERPL-MCNC: 16 MG/DL — SIGNIFICANT CHANGE UP (ref 7–23)
CALCIUM SERPL-MCNC: 7.7 MG/DL — LOW (ref 8.5–10.1)
CHLORIDE SERPL-SCNC: 108 MMOL/L — SIGNIFICANT CHANGE UP (ref 96–108)
CO2 SERPL-SCNC: 22 MMOL/L — SIGNIFICANT CHANGE UP (ref 22–31)
CREAT SERPL-MCNC: 0.54 MG/DL — SIGNIFICANT CHANGE UP (ref 0.5–1.3)
CULTURE RESULTS: SIGNIFICANT CHANGE UP
EGFR: 105 ML/MIN/1.73M2 — SIGNIFICANT CHANGE UP
GLUCOSE SERPL-MCNC: 115 MG/DL — HIGH (ref 70–99)
HCT VFR BLD CALC: 33.7 % — LOW (ref 34.5–45)
HCT VFR BLD CALC: 35.9 % — SIGNIFICANT CHANGE UP (ref 34.5–45)
HCV AB S/CO SERPL IA: 0.39 S/CO — SIGNIFICANT CHANGE UP (ref 0–0.99)
HCV AB SERPL-IMP: SIGNIFICANT CHANGE UP
HGB BLD-MCNC: 11.1 G/DL — LOW (ref 11.5–15.5)
HGB BLD-MCNC: 12 G/DL — SIGNIFICANT CHANGE UP (ref 11.5–15.5)
MCHC RBC-ENTMCNC: 30.4 PG — SIGNIFICANT CHANGE UP (ref 27–34)
MCHC RBC-ENTMCNC: 30.8 PG — SIGNIFICANT CHANGE UP (ref 27–34)
MCHC RBC-ENTMCNC: 32.9 GM/DL — SIGNIFICANT CHANGE UP (ref 32–36)
MCHC RBC-ENTMCNC: 33.4 GM/DL — SIGNIFICANT CHANGE UP (ref 32–36)
MCV RBC AUTO: 92.1 FL — SIGNIFICANT CHANGE UP (ref 80–100)
MCV RBC AUTO: 92.3 FL — SIGNIFICANT CHANGE UP (ref 80–100)
NRBC # BLD: 0 /100 WBCS — SIGNIFICANT CHANGE UP (ref 0–0)
NRBC # BLD: 0 /100 WBCS — SIGNIFICANT CHANGE UP (ref 0–0)
PLATELET # BLD AUTO: 320 K/UL — SIGNIFICANT CHANGE UP (ref 150–400)
PLATELET # BLD AUTO: 333 K/UL — SIGNIFICANT CHANGE UP (ref 150–400)
POTASSIUM SERPL-MCNC: 3.5 MMOL/L — SIGNIFICANT CHANGE UP (ref 3.5–5.3)
POTASSIUM SERPL-SCNC: 3.5 MMOL/L — SIGNIFICANT CHANGE UP (ref 3.5–5.3)
PROT SERPL-MCNC: 6.3 G/DL — SIGNIFICANT CHANGE UP (ref 6–8.3)
RBC # BLD: 3.65 M/UL — LOW (ref 3.8–5.2)
RBC # BLD: 3.9 M/UL — SIGNIFICANT CHANGE UP (ref 3.8–5.2)
RBC # FLD: 12.3 % — SIGNIFICANT CHANGE UP (ref 10.3–14.5)
RBC # FLD: 12.4 % — SIGNIFICANT CHANGE UP (ref 10.3–14.5)
SODIUM SERPL-SCNC: 140 MMOL/L — SIGNIFICANT CHANGE UP (ref 135–145)
SPECIMEN SOURCE: SIGNIFICANT CHANGE UP
WBC # BLD: 19.74 K/UL — HIGH (ref 3.8–10.5)
WBC # BLD: 22.6 K/UL — HIGH (ref 3.8–10.5)
WBC # FLD AUTO: 19.74 K/UL — HIGH (ref 3.8–10.5)
WBC # FLD AUTO: 22.6 K/UL — HIGH (ref 3.8–10.5)

## 2024-04-07 PROCEDURE — 74018 RADEX ABDOMEN 1 VIEW: CPT | Mod: 26

## 2024-04-07 RX ORDER — ACETAMINOPHEN 500 MG
1000 TABLET ORAL ONCE
Refills: 0 | Status: DISCONTINUED | OUTPATIENT
Start: 2024-04-07 | End: 2024-04-07

## 2024-04-07 RX ORDER — ONDANSETRON 8 MG/1
4 TABLET, FILM COATED ORAL ONCE
Refills: 0 | Status: COMPLETED | OUTPATIENT
Start: 2024-04-07 | End: 2024-04-07

## 2024-04-07 RX ORDER — METOCLOPRAMIDE HCL 10 MG
5 TABLET ORAL ONCE
Refills: 0 | Status: COMPLETED | OUTPATIENT
Start: 2024-04-07 | End: 2024-04-07

## 2024-04-07 RX ORDER — METHYLNALTREXONE BROMIDE 12 MG/.6ML
12 INJECTION, SOLUTION SUBCUTANEOUS ONCE
Refills: 0 | Status: COMPLETED | OUTPATIENT
Start: 2024-04-07 | End: 2024-04-07

## 2024-04-07 RX ORDER — ACETAMINOPHEN 500 MG
1000 TABLET ORAL ONCE
Refills: 0 | Status: COMPLETED | OUTPATIENT
Start: 2024-04-07 | End: 2024-04-07

## 2024-04-07 RX ORDER — METOCLOPRAMIDE HCL 10 MG
5 TABLET ORAL EVERY 6 HOURS
Refills: 0 | Status: DISCONTINUED | OUTPATIENT
Start: 2024-04-07 | End: 2024-04-11

## 2024-04-07 RX ORDER — ONDANSETRON 8 MG/1
4 TABLET, FILM COATED ORAL EVERY 6 HOURS
Refills: 0 | Status: DISCONTINUED | OUTPATIENT
Start: 2024-04-07 | End: 2024-04-11

## 2024-04-07 RX ORDER — SODIUM CHLORIDE 9 MG/ML
1000 INJECTION INTRAMUSCULAR; INTRAVENOUS; SUBCUTANEOUS
Refills: 0 | Status: DISCONTINUED | OUTPATIENT
Start: 2024-04-07 | End: 2024-04-11

## 2024-04-07 RX ORDER — TRAMADOL HYDROCHLORIDE 50 MG/1
50 TABLET ORAL ONCE
Refills: 0 | Status: DISCONTINUED | OUTPATIENT
Start: 2024-04-07 | End: 2024-04-07

## 2024-04-07 RX ORDER — METOCLOPRAMIDE HCL 10 MG
5 TABLET ORAL ONCE
Refills: 0 | Status: DISCONTINUED | OUTPATIENT
Start: 2024-04-07 | End: 2024-04-07

## 2024-04-07 RX ADMIN — Medication 650 MILLIGRAM(S): at 15:42

## 2024-04-07 RX ADMIN — ONDANSETRON 4 MILLIGRAM(S): 8 TABLET, FILM COATED ORAL at 04:30

## 2024-04-07 RX ADMIN — SODIUM CHLORIDE 75 MILLILITER(S): 9 INJECTION INTRAMUSCULAR; INTRAVENOUS; SUBCUTANEOUS at 09:56

## 2024-04-07 RX ADMIN — Medication 5 MILLIGRAM(S): at 05:23

## 2024-04-07 RX ADMIN — Medication 400 MILLIGRAM(S): at 05:07

## 2024-04-07 RX ADMIN — POLYETHYLENE GLYCOL 3350 17 GRAM(S): 17 POWDER, FOR SOLUTION ORAL at 11:43

## 2024-04-07 RX ADMIN — Medication 650 MILLIGRAM(S): at 14:42

## 2024-04-07 RX ADMIN — Medication 1000 MILLIGRAM(S): at 00:00

## 2024-04-07 RX ADMIN — Medication 1000 MILLIGRAM(S): at 05:37

## 2024-04-07 RX ADMIN — TRAMADOL HYDROCHLORIDE 50 MILLIGRAM(S): 50 TABLET ORAL at 09:48

## 2024-04-07 RX ADMIN — METHYLNALTREXONE BROMIDE 12 MILLIGRAM(S): 12 INJECTION, SOLUTION SUBCUTANEOUS at 13:46

## 2024-04-07 RX ADMIN — ONDANSETRON 4 MILLIGRAM(S): 8 TABLET, FILM COATED ORAL at 14:42

## 2024-04-07 RX ADMIN — SENNA PLUS 1 TABLET(S): 8.6 TABLET ORAL at 22:03

## 2024-04-07 RX ADMIN — Medication 5 MILLIGRAM(S): at 09:47

## 2024-04-07 RX ADMIN — TRAMADOL HYDROCHLORIDE 50 MILLIGRAM(S): 50 TABLET ORAL at 15:03

## 2024-04-07 RX ADMIN — Medication 100 MICROGRAM(S): at 05:23
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
## 2024-04-07 NOTE — CARE COORDINATION ASSESSMENT. - OTHER PERTINENT DISCHARGE PLANNING INFORMATION:
CM met with the patient at the bedside, educated pt on role of CM and transition planning. Patient verbalized understanding. CM provided direct contact/resource folder and remains available. Pt resides in a house with her spouse, has no steps to get in and a flight to upstairs. Per Patient she is independent with ADLs, ambulating and negotiating steps. Denies any DME or prior home care services. Pharmacy, [facility].

## 2024-04-07 NOTE — PROGRESS NOTE ADULT - NS ATTEND AMEND GEN_ALL_CORE FT
62 yo fem constipation, fecal impaction, had BM  -Cont bowel prep  -Start diet as toleated 64 yo fem constipation, fecal impaction, had BM  -Cont bowel prep  -Start diet as tolerated

## 2024-04-07 NOTE — CARE COORDINATION ASSESSMENT. - NSCAREPROVIDERS_GEN_ALL_CORE_FT
CARE PROVIDERS:  Accepting Physician: Harriet Fournier  Administration: Bart Mcnair  Administration: Maxim Peter  Admitting: Harriet Fournier  Attending: Harriet Fournier  Consultant: John Aguilar  Consultant: Alyx Tanner  Consultant: Eva Garcia  Consultant: Blake Veronica  Consultant: Chapo Torres  Covering Team: Elisabeth Calabrese  ED ACP: Yelena Alva ED Attending: Padmini Pradhan ED Nurse: Monik Perez  Nurse: Servando Salazar  Nurse: Letty Martino  Nurse: Cally Gutierrez  Ordered: ADM, User  Ordered: Doctor, Unknown  Override: Yun Del Cid  Override: Letty Martino  PCA/Nursing Assistant: Randall Ji  Primary Team: Latasha Salcido  Registered Dietitian: Hafsa Abdi  : Ivory Choe// Supp. Assoc.: Crystal Carbone

## 2024-04-08 DIAGNOSIS — R10.9 UNSPECIFIED ABDOMINAL PAIN: ICD-10-CM

## 2024-04-08 LAB
ALBUMIN SERPL ELPH-MCNC: 2.5 G/DL — LOW (ref 3.3–5)
ALP SERPL-CCNC: 62 U/L — SIGNIFICANT CHANGE UP (ref 40–120)
ALT FLD-CCNC: 20 U/L — SIGNIFICANT CHANGE UP (ref 12–78)
ANION GAP SERPL CALC-SCNC: 7 MMOL/L — SIGNIFICANT CHANGE UP (ref 5–17)
AST SERPL-CCNC: 21 U/L — SIGNIFICANT CHANGE UP (ref 15–37)
BILIRUB SERPL-MCNC: 0.5 MG/DL — SIGNIFICANT CHANGE UP (ref 0.2–1.2)
BUN SERPL-MCNC: 11 MG/DL — SIGNIFICANT CHANGE UP (ref 7–23)
CALCIUM SERPL-MCNC: 8 MG/DL — LOW (ref 8.5–10.1)
CHLORIDE SERPL-SCNC: 109 MMOL/L — HIGH (ref 96–108)
CO2 SERPL-SCNC: 26 MMOL/L — SIGNIFICANT CHANGE UP (ref 22–31)
CREAT SERPL-MCNC: 0.52 MG/DL — SIGNIFICANT CHANGE UP (ref 0.5–1.3)
EGFR: 106 ML/MIN/1.73M2 — SIGNIFICANT CHANGE UP
GLUCOSE SERPL-MCNC: 77 MG/DL — SIGNIFICANT CHANGE UP (ref 70–99)
HCT VFR BLD CALC: 34 % — LOW (ref 34.5–45)
HGB BLD-MCNC: 11.2 G/DL — LOW (ref 11.5–15.5)
MAGNESIUM SERPL-MCNC: 2.2 MG/DL — SIGNIFICANT CHANGE UP (ref 1.6–2.6)
MCHC RBC-ENTMCNC: 30.8 PG — SIGNIFICANT CHANGE UP (ref 27–34)
MCHC RBC-ENTMCNC: 32.9 GM/DL — SIGNIFICANT CHANGE UP (ref 32–36)
MCV RBC AUTO: 93.4 FL — SIGNIFICANT CHANGE UP (ref 80–100)
NRBC # BLD: 0 /100 WBCS — SIGNIFICANT CHANGE UP (ref 0–0)
PHOSPHATE SERPL-MCNC: 1.8 MG/DL — LOW (ref 2.5–4.5)
PLATELET # BLD AUTO: 332 K/UL — SIGNIFICANT CHANGE UP (ref 150–400)
POTASSIUM SERPL-MCNC: 3.7 MMOL/L — SIGNIFICANT CHANGE UP (ref 3.5–5.3)
POTASSIUM SERPL-SCNC: 3.7 MMOL/L — SIGNIFICANT CHANGE UP (ref 3.5–5.3)
PROT SERPL-MCNC: 5.8 G/DL — LOW (ref 6–8.3)
RBC # BLD: 3.64 M/UL — LOW (ref 3.8–5.2)
RBC # FLD: 12.4 % — SIGNIFICANT CHANGE UP (ref 10.3–14.5)
SODIUM SERPL-SCNC: 142 MMOL/L — SIGNIFICANT CHANGE UP (ref 135–145)
WBC # BLD: 17.6 K/UL — HIGH (ref 3.8–10.5)
WBC # FLD AUTO: 17.6 K/UL — HIGH (ref 3.8–10.5)

## 2024-04-08 PROCEDURE — 74177 CT ABD & PELVIS W/CONTRAST: CPT | Mod: 26

## 2024-04-08 RX ORDER — IOHEXOL 300 MG/ML
30 INJECTION, SOLUTION INTRAVENOUS ONCE
Refills: 0 | Status: COMPLETED | OUTPATIENT
Start: 2024-04-08 | End: 2024-04-08

## 2024-04-08 RX ORDER — SOD SULF/SODIUM/NAHCO3/KCL/PEG
4000 SOLUTION, RECONSTITUTED, ORAL ORAL ONCE
Refills: 0 | Status: COMPLETED | OUTPATIENT
Start: 2024-04-08 | End: 2024-04-08

## 2024-04-08 RX ORDER — ACETAMINOPHEN 500 MG
1000 TABLET ORAL ONCE
Refills: 0 | Status: COMPLETED | OUTPATIENT
Start: 2024-04-08 | End: 2024-04-08

## 2024-04-08 RX ORDER — POTASSIUM PHOSPHATE, MONOBASIC POTASSIUM PHOSPHATE, DIBASIC 236; 224 MG/ML; MG/ML
30 INJECTION, SOLUTION INTRAVENOUS ONCE
Refills: 0 | Status: COMPLETED | OUTPATIENT
Start: 2024-04-08 | End: 2024-04-08

## 2024-04-08 RX ORDER — METHYLNALTREXONE BROMIDE 12 MG/.6ML
12 INJECTION, SOLUTION SUBCUTANEOUS ONCE
Refills: 0 | Status: COMPLETED | OUTPATIENT
Start: 2024-04-08 | End: 2024-04-08

## 2024-04-08 RX ADMIN — LACTULOSE 10 GRAM(S): 10 SOLUTION ORAL at 18:34

## 2024-04-08 RX ADMIN — METHYLNALTREXONE BROMIDE 12 MILLIGRAM(S): 12 INJECTION, SOLUTION SUBCUTANEOUS at 13:16

## 2024-04-08 RX ADMIN — Medication 400 MILLIGRAM(S): at 08:59

## 2024-04-08 RX ADMIN — IOHEXOL 30 MILLILITER(S): 300 INJECTION, SOLUTION INTRAVENOUS at 13:05

## 2024-04-08 RX ADMIN — POTASSIUM PHOSPHATE, MONOBASIC POTASSIUM PHOSPHATE, DIBASIC 83.33 MILLIMOLE(S): 236; 224 INJECTION, SOLUTION INTRAVENOUS at 13:06

## 2024-04-08 RX ADMIN — Medication 100 MICROGRAM(S): at 05:11

## 2024-04-08 RX ADMIN — SENNA PLUS 1 TABLET(S): 8.6 TABLET ORAL at 21:57

## 2024-04-08 RX ADMIN — Medication 4000 MILLILITER(S): at 18:35

## 2024-04-08 NOTE — PROGRESS NOTE ADULT - NS ATTEND AMEND GEN_ALL_CORE FT
62 yo fem with constipation/fecal impaction, had small BM after golytely    Abd soft mildly tender    -Will repeat CT Abd-pelv Po-IV-rectal contrast r/o complete large bowel obstruction. If there's no complete LBO she might need more Golytely

## 2024-04-08 NOTE — CASE MANAGEMENT PROGRESS NOTE - NSCMPROGRESSNOTE_GEN_ALL_CORE
Patient discussed during rounds and remains acute. Patient receiving IV potassium phosphate and Relistor. Phosphorus-1.8. Pending CT scan results to r/o obstruction per MD. CM will continue to collaborate with interdisciplinary team and remain available to assist.

## 2024-04-09 LAB
ANION GAP SERPL CALC-SCNC: 9 MMOL/L — SIGNIFICANT CHANGE UP (ref 5–17)
BUN SERPL-MCNC: 6 MG/DL — LOW (ref 7–23)
CALCIUM SERPL-MCNC: 8.1 MG/DL — LOW (ref 8.5–10.1)
CHLORIDE SERPL-SCNC: 106 MMOL/L — SIGNIFICANT CHANGE UP (ref 96–108)
CO2 SERPL-SCNC: 26 MMOL/L — SIGNIFICANT CHANGE UP (ref 22–31)
CREAT SERPL-MCNC: 0.61 MG/DL — SIGNIFICANT CHANGE UP (ref 0.5–1.3)
EGFR: 102 ML/MIN/1.73M2 — SIGNIFICANT CHANGE UP
GLUCOSE SERPL-MCNC: 81 MG/DL — SIGNIFICANT CHANGE UP (ref 70–99)
HCT VFR BLD CALC: 36.4 % — SIGNIFICANT CHANGE UP (ref 34.5–45)
HGB BLD-MCNC: 11.6 G/DL — SIGNIFICANT CHANGE UP (ref 11.5–15.5)
MAGNESIUM SERPL-MCNC: 2.4 MG/DL — SIGNIFICANT CHANGE UP (ref 1.6–2.6)
MCHC RBC-ENTMCNC: 29.8 PG — SIGNIFICANT CHANGE UP (ref 27–34)
MCHC RBC-ENTMCNC: 31.9 GM/DL — LOW (ref 32–36)
MCV RBC AUTO: 93.6 FL — SIGNIFICANT CHANGE UP (ref 80–100)
NRBC # BLD: 0 /100 WBCS — SIGNIFICANT CHANGE UP (ref 0–0)
PHOSPHATE SERPL-MCNC: 2.4 MG/DL — LOW (ref 2.5–4.5)
PLATELET # BLD AUTO: 378 K/UL — SIGNIFICANT CHANGE UP (ref 150–400)
POTASSIUM SERPL-MCNC: 3.9 MMOL/L — SIGNIFICANT CHANGE UP (ref 3.5–5.3)
POTASSIUM SERPL-SCNC: 3.9 MMOL/L — SIGNIFICANT CHANGE UP (ref 3.5–5.3)
RBC # BLD: 3.89 M/UL — SIGNIFICANT CHANGE UP (ref 3.8–5.2)
RBC # FLD: 12.5 % — SIGNIFICANT CHANGE UP (ref 10.3–14.5)
SODIUM SERPL-SCNC: 141 MMOL/L — SIGNIFICANT CHANGE UP (ref 135–145)
WBC # BLD: 14.26 K/UL — HIGH (ref 3.8–10.5)
WBC # FLD AUTO: 14.26 K/UL — HIGH (ref 3.8–10.5)

## 2024-04-09 PROCEDURE — 74019 RADEX ABDOMEN 2 VIEWS: CPT | Mod: 26

## 2024-04-09 RX ORDER — SODIUM,POTASSIUM PHOSPHATES 278-250MG
1 POWDER IN PACKET (EA) ORAL ONCE
Refills: 0 | Status: COMPLETED | OUTPATIENT
Start: 2024-04-09 | End: 2024-04-09

## 2024-04-09 RX ORDER — NALOXEGOL OXALATE 12.5 MG/1
25 TABLET, FILM COATED ORAL DAILY
Refills: 0 | Status: DISCONTINUED | OUTPATIENT
Start: 2024-04-09 | End: 2024-04-11

## 2024-04-09 RX ORDER — MULTIVIT WITH MIN/MFOLATE/K2 340-15/3 G
296 POWDER (GRAM) ORAL ONCE
Refills: 0 | Status: COMPLETED | OUTPATIENT
Start: 2024-04-09 | End: 2024-04-09

## 2024-04-09 RX ADMIN — LACTULOSE 10 GRAM(S): 10 SOLUTION ORAL at 11:00

## 2024-04-09 RX ADMIN — Medication 650 MILLIGRAM(S): at 15:47

## 2024-04-09 RX ADMIN — Medication 650 MILLIGRAM(S): at 22:03

## 2024-04-09 RX ADMIN — Medication 650 MILLIGRAM(S): at 08:41

## 2024-04-09 RX ADMIN — NALOXEGOL OXALATE 25 MILLIGRAM(S): 12.5 TABLET, FILM COATED ORAL at 18:15

## 2024-04-09 RX ADMIN — Medication 650 MILLIGRAM(S): at 23:40

## 2024-04-09 RX ADMIN — SODIUM CHLORIDE 75 MILLILITER(S): 9 INJECTION INTRAMUSCULAR; INTRAVENOUS; SUBCUTANEOUS at 11:07

## 2024-04-09 RX ADMIN — SENNA PLUS 1 TABLET(S): 8.6 TABLET ORAL at 22:03

## 2024-04-09 RX ADMIN — POLYETHYLENE GLYCOL 3350 17 GRAM(S): 17 POWDER, FOR SOLUTION ORAL at 11:01

## 2024-04-09 RX ADMIN — LACTULOSE 10 GRAM(S): 10 SOLUTION ORAL at 05:50

## 2024-04-09 RX ADMIN — Medication 650 MILLIGRAM(S): at 14:55

## 2024-04-09 RX ADMIN — Medication 296 MILLILITER(S): at 18:14

## 2024-04-09 RX ADMIN — Medication 650 MILLIGRAM(S): at 09:39

## 2024-04-09 RX ADMIN — Medication 100 MICROGRAM(S): at 05:50

## 2024-04-09 RX ADMIN — ONDANSETRON 4 MILLIGRAM(S): 8 TABLET, FILM COATED ORAL at 19:39

## 2024-04-09 RX ADMIN — Medication 1 PACKET(S): at 11:00

## 2024-04-09 NOTE — PROGRESS NOTE ADULT - NS ATTEND AMEND GEN_ALL_CORE FT
62 yo fem constipation/fecal impaction, refused golytely yest, passing some flatus, small BM  Abd distended, mildly tender    -Cont laxatives as much as possible  -Patient refers having small Bms right after relistor injection. REcommend to continue it as per GI's recommendation  -Ambulation  -flex sigmoidoscopy? to irrigate rectosigmoid stool?

## 2024-04-09 NOTE — PROGRESS NOTE ADULT - PROBLEM SELECTOR PLAN 1
Clear liquid diet  Bowel regimen  Ambulation and OOB  Pending GI consult  White count improving- will continue to monitor.    Hypophasphatemia- will replete with IV phosphorous.   Continue to monitor I&O  Will review with Dr. Love.
Continue bowel regimen  Awaiting AM labs.  Full liquid diet  Ambulation  Per ID will continue to monitor off ABX  Monitor I&O  Will review with Dr. Love.

## 2024-04-10 LAB
HCT VFR BLD CALC: 34.8 % — SIGNIFICANT CHANGE UP (ref 34.5–45)
HGB BLD-MCNC: 11.5 G/DL — SIGNIFICANT CHANGE UP (ref 11.5–15.5)
MCHC RBC-ENTMCNC: 30.2 PG — SIGNIFICANT CHANGE UP (ref 27–34)
MCHC RBC-ENTMCNC: 33 GM/DL — SIGNIFICANT CHANGE UP (ref 32–36)
MCV RBC AUTO: 91.3 FL — SIGNIFICANT CHANGE UP (ref 80–100)
NRBC # BLD: 0 /100 WBCS — SIGNIFICANT CHANGE UP (ref 0–0)
PLATELET # BLD AUTO: 382 K/UL — SIGNIFICANT CHANGE UP (ref 150–400)
RBC # BLD: 3.81 M/UL — SIGNIFICANT CHANGE UP (ref 3.8–5.2)
RBC # FLD: 12.4 % — SIGNIFICANT CHANGE UP (ref 10.3–14.5)
WBC # BLD: 11.54 K/UL — HIGH (ref 3.8–10.5)
WBC # FLD AUTO: 11.54 K/UL — HIGH (ref 3.8–10.5)

## 2024-04-10 RX ADMIN — LACTULOSE 10 GRAM(S): 10 SOLUTION ORAL at 17:11

## 2024-04-10 RX ADMIN — Medication 650 MILLIGRAM(S): at 23:01

## 2024-04-10 RX ADMIN — LACTULOSE 10 GRAM(S): 10 SOLUTION ORAL at 11:18

## 2024-04-10 RX ADMIN — ONDANSETRON 4 MILLIGRAM(S): 8 TABLET, FILM COATED ORAL at 21:22

## 2024-04-10 RX ADMIN — Medication 650 MILLIGRAM(S): at 21:23

## 2024-04-10 RX ADMIN — SENNA PLUS 1 TABLET(S): 8.6 TABLET ORAL at 21:22

## 2024-04-10 RX ADMIN — POLYETHYLENE GLYCOL 3350 17 GRAM(S): 17 POWDER, FOR SOLUTION ORAL at 11:18

## 2024-04-10 RX ADMIN — LACTULOSE 10 GRAM(S): 10 SOLUTION ORAL at 23:46

## 2024-04-10 RX ADMIN — Medication 100 MICROGRAM(S): at 05:03

## 2024-04-10 RX ADMIN — NALOXEGOL OXALATE 25 MILLIGRAM(S): 12.5 TABLET, FILM COATED ORAL at 11:18

## 2024-04-11 ENCOUNTER — TRANSCRIPTION ENCOUNTER (OUTPATIENT)
Age: 64
End: 2024-04-11

## 2024-04-11 VITALS
HEART RATE: 75 BPM | SYSTOLIC BLOOD PRESSURE: 122 MMHG | RESPIRATION RATE: 18 BRPM | DIASTOLIC BLOOD PRESSURE: 79 MMHG | TEMPERATURE: 98 F | OXYGEN SATURATION: 95 %

## 2024-04-11 LAB
ALBUMIN SERPL ELPH-MCNC: 3 G/DL — LOW (ref 3.3–5)
ALP SERPL-CCNC: 60 U/L — SIGNIFICANT CHANGE UP (ref 40–120)
ALT FLD-CCNC: 40 U/L — SIGNIFICANT CHANGE UP (ref 12–78)
ANION GAP SERPL CALC-SCNC: 7 MMOL/L — SIGNIFICANT CHANGE UP (ref 5–17)
AST SERPL-CCNC: 39 U/L — HIGH (ref 15–37)
BILIRUB SERPL-MCNC: 0.4 MG/DL — SIGNIFICANT CHANGE UP (ref 0.2–1.2)
BUN SERPL-MCNC: 3 MG/DL — LOW (ref 7–23)
CALCIUM SERPL-MCNC: 8.7 MG/DL — SIGNIFICANT CHANGE UP (ref 8.5–10.1)
CHLORIDE SERPL-SCNC: 110 MMOL/L — HIGH (ref 96–108)
CO2 SERPL-SCNC: 26 MMOL/L — SIGNIFICANT CHANGE UP (ref 22–31)
CREAT SERPL-MCNC: 0.62 MG/DL — SIGNIFICANT CHANGE UP (ref 0.5–1.3)
CULTURE RESULTS: SIGNIFICANT CHANGE UP
CULTURE RESULTS: SIGNIFICANT CHANGE UP
EGFR: 101 ML/MIN/1.73M2 — SIGNIFICANT CHANGE UP
GLUCOSE SERPL-MCNC: 82 MG/DL — SIGNIFICANT CHANGE UP (ref 70–99)
HCT VFR BLD CALC: 36.2 % — SIGNIFICANT CHANGE UP (ref 34.5–45)
HGB BLD-MCNC: 11.9 G/DL — SIGNIFICANT CHANGE UP (ref 11.5–15.5)
MCHC RBC-ENTMCNC: 30.9 PG — SIGNIFICANT CHANGE UP (ref 27–34)
MCHC RBC-ENTMCNC: 32.9 GM/DL — SIGNIFICANT CHANGE UP (ref 32–36)
MCV RBC AUTO: 94 FL — SIGNIFICANT CHANGE UP (ref 80–100)
NRBC # BLD: 0 /100 WBCS — SIGNIFICANT CHANGE UP (ref 0–0)
PLATELET # BLD AUTO: 361 K/UL — SIGNIFICANT CHANGE UP (ref 150–400)
POTASSIUM SERPL-MCNC: 3.8 MMOL/L — SIGNIFICANT CHANGE UP (ref 3.5–5.3)
POTASSIUM SERPL-SCNC: 3.8 MMOL/L — SIGNIFICANT CHANGE UP (ref 3.5–5.3)
PROT SERPL-MCNC: 6.4 G/DL — SIGNIFICANT CHANGE UP (ref 6–8.3)
RBC # BLD: 3.85 M/UL — SIGNIFICANT CHANGE UP (ref 3.8–5.2)
RBC # FLD: 12.5 % — SIGNIFICANT CHANGE UP (ref 10.3–14.5)
SODIUM SERPL-SCNC: 143 MMOL/L — SIGNIFICANT CHANGE UP (ref 135–145)
SPECIMEN SOURCE: SIGNIFICANT CHANGE UP
SPECIMEN SOURCE: SIGNIFICANT CHANGE UP
WBC # BLD: 10.3 K/UL — SIGNIFICANT CHANGE UP (ref 3.8–10.5)
WBC # FLD AUTO: 10.3 K/UL — SIGNIFICANT CHANGE UP (ref 3.8–10.5)

## 2024-04-11 RX ORDER — NALOXEGOL OXALATE 12.5 MG/1
1 TABLET, FILM COATED ORAL
Qty: 10 | Refills: 0
Start: 2024-04-11 | End: 2024-04-20

## 2024-04-11 RX ORDER — LACTULOSE 10 G/15ML
15 SOLUTION ORAL
Qty: 300 | Refills: 0
Start: 2024-04-11 | End: 2024-04-15

## 2024-04-11 RX ADMIN — NALOXEGOL OXALATE 25 MILLIGRAM(S): 12.5 TABLET, FILM COATED ORAL at 11:29

## 2024-04-11 RX ADMIN — Medication 100 MICROGRAM(S): at 05:28

## 2024-04-11 RX ADMIN — LACTULOSE 10 GRAM(S): 10 SOLUTION ORAL at 05:28

## 2024-04-11 NOTE — DISCHARGE NOTE NURSING/CASE MANAGEMENT/SOCIAL WORK - NSDCPEFALRISK_GEN_ALL_CORE
For information on Fall & Injury Prevention, visit: [email or web address] OR  [email or web address] OR  https://www.cdc.gov/steadi/patient.html

## 2024-04-11 NOTE — DISCHARGE NOTE PROVIDER - NSDCFUSCHEDAPPT_GEN_ALL_CORE_FT
Mandeep Chand  [facility]  [address] [place] [address]  Scheduled Appointment: 05/15/2024

## 2024-04-11 NOTE — DISCHARGE NOTE PROVIDER - NSDCMRMEDTOKEN_GEN_ALL_CORE_FT
lactulose 10 g/15 mL oral syrup: 15 milliliter(s) orally every 6 hours  levothyroxine 100 mcg (0.1 mg) oral tablet: 1 tab(s) orally once a day AM  naloxegol 25 mg oral tablet: 1 tab(s) orally once a day

## 2024-04-11 NOTE — DISCHARGE NOTE PROVIDER - NSDCCPCAREPLAN_GEN_ALL_CORE_FT
PRINCIPAL DISCHARGE DIAGNOSIS  Diagnosis: Abdominal pain  Assessment and Plan of Treatment: SBO sec to severe constipation  - sp Golytly  - sp lactulose  - sp relistoe  - finally resolved  - seen by surgery and GI   - TOLERATING DIET

## 2024-04-11 NOTE — PROGRESS NOTE ADULT - ASSESSMENT
63 y female with PMHx of hypothyroidism, endometriosis (multiple laparoscopic procedures), enlarged aorta, IBS, recent shoulder surgery 10 days ago, presents to ED with complaint of lower abdominal pain, constipation and vomiting   constipation  ileus   leukocytosis -- reactive    RECOMMENDATIONS  1-Bowel Obstruction  pt is nontoxic  ct abd noted, repeat CT 4/8  bowel regimen/ laxative /looks to have sig fecal obstruction    2-Leukocytosis appears reactive  obs off abx    Thank you for consulting us and involving us in the management of this most interesting and challenging case.  Please call us at [phone] or text me directly on my cell#[phone] with any concerns or further questions.  
constipation  shoulder pain    Continue bowel regimen  add lactulose 10cc q 6 hours and glycerin suppository  gas pill with simethicone and maalox q 6 hours  monitor stool output  Additional relistor dose today  d/w pt    I reviewed the overnight course of events on the unit, re-confirming the patient history. I discussed the care with the patient  Differential diagnosis and plan of care discussed with patient after the evaluation  35 minutes spent on total encounter of which more than fifty percent of the encounter was spent counseling and/or coordinating care by the attending physician.
constipation  shoulder pain    Continue bowel regimen  add lactulose 10cc q 6 hours and glycerin suppository  gas pill with simethicone and maalox q 6 hours  monitor stool output  d/w pt    I reviewed the overnight course of events on the unit, re-confirming the patient history. I discussed the care with the patient  Differential diagnosis and plan of care discussed with patient after the evaluation  35 minutes spent on total encounter of which more than fifty percent of the encounter was spent counseling and/or coordinating care by the attending physician.
63 y female with PMHx of hypothyroidism, endometriosis (multiple laparoscopic procedures), enlarged aorta, IBS, recent shoulder surgery 10 days ago, presents to ED with complaint of lower abdominal pain and vomiting for 1 day. Patient states she has not had a bowel movement since surgery 10 days ago, has been taking Vicodin and Toradol for pain. Patient attempted laxative therapy but was not successful. Patient vomit x2 today, yellow in color. Patient denies fever, chest pain, or SOB.      Stercoliths sec to constipation  - cw Golytely  - GI and surgery following  - avoid opoids  - NPO for now  - sp Movantik  - tap water enema today     Hypothyroid  - cw synthroid     Venodyne
63 y female with PMHx of hypothyroidism, endometriosis (multiple laparoscopic procedures), enlarged aorta, IBS, recent shoulder surgery 10 days ago, presents to ED with complaint of lower abdominal pain and vomiting for 1 day. Patient states she has not had a bowel movement since surgery 10 days ago, has been taking Vicodin and Toradol for pain. Patient attempted laxative therapy but was not successful. Patient vomit x2 today, yellow in color. Patient denies fever, chest pain, or SOB.      Stercoliths sec to constipation  - cw Golytely  - GI and surgery following  - avoid opoids  - NPO for now  - sp relistor  - had a BM yesterday and today  - will start diet     Hypothyroid  - cw synthroid     Venodyne
64 yo fem constipated/stool impaction  -Cont bowel prep  -GI consult  -Start diet once evidence of bowel activity  -IV Abx
constipation  shoulder pain    +multiple BM  D/c plan with 1 week of lactulose 10mg q6h and movantik 25mg daily  monitor stool output  pt plans to follow with her primary GI  d/c planning    I reviewed the overnight course of events on the unit, re-confirming the patient history. I discussed the care with the patient  Differential diagnosis and plan of care discussed with patient after the evaluation  35 minutes spent on total encounter of which more than fifty percent of the encounter was spent counseling and/or coordinating care by the attending physician.  
63 y female with PMHx of hypothyroidism, endometriosis (multiple laparoscopic procedures), enlarged aorta, IBS, recent shoulder surgery 10 days ago, presents to ED with complaint of lower abdominal pain and vomiting for 1 day. Patient states she has not had a bowel movement since surgery 10 days ago, has been taking Vicodin and Toradol for pain. Patient attempted laxative therapy but was not successful. Patient vomit x2 today, yellow in color. Patient denies fever, chest pain, or SOB.      Stercoliths sec to constipation  - cw Golytely  - GI and surgery following  - avoid opoids  - NPO for now  - sp relistor      Hypothyroid  - cw synthroid     Venodyne
63 y female with PMHx of hypothyroidism, endometriosis (multiple laparoscopic procedures), enlarged aorta, IBS, recent shoulder surgery 10 days ago, presents to ED with complaint of lower abdominal pain and vomiting for 1 day. Patient states she has not had a bowel movement since surgery 10 days ago, has been taking Vicodin and Toradol for pain. Patient attempted laxative therapy but was not successful. Patient vomit x2 today, yellow in color. Patient denies fever, chest pain, or SOB.      Stercoliths sec to constipation, sbo   - cw Golytely  - GI and surgery following  - avoid opoids  - Advanced diet as tolerated   - sp relistor      Hypothyroid  - cw synthroid     Venodyne    dc planning in am       
constipation  shoulder pain    plan-   Continue bowel regimen  add lactulose 10cc q 6 hours and glycerin suppository  gas pill with simethicone and maalox q 6 hours  monitor stool output  Relistor added today       Advanced care planning was discussed with patient and family.  Advanced care planning forms were reviewed and discussed.  Risks, benefits and alternatives of gastroenterologic procedures were discussed in detail and all questions were answered.    30 minutes spent. 
constipation  shoulder pain    Continue bowel regimen  add lactulose 10cc q 6 hours and glycerin suppository  gas pill with simethicone and maalox q 6 hours  monitor stool output  Additional relistor dose today  d/w pt    I reviewed the overnight course of events on the unit, re-confirming the patient history. I discussed the care with the patient  Differential diagnosis and plan of care discussed with patient after the evaluation  35 minutes spent on total encounter of which more than fifty percent of the encounter was spent counseling and/or coordinating care by the attending physician.  
62 yo female here with pmhx of endometriosis, s./p right shoulder surgery recently, now with abdominal pain, noted to have fecal retention.  Currently on a bowel regimen.  
64 yo female with pmhx of endometriosis, and recent shoulder surgery, here for abdominal pain.  Noted to have large stool burden in her colon.  Currently NPO.

## 2024-04-11 NOTE — DISCHARGE NOTE PROVIDER - NSDCCAREPROVSEEN_GEN_ALL_CORE_FT
Paras Nieves, Marylou Jimenes, Lauren Torres, Chapo Lopez, Celine Salcido, Latasha Estrada, Coy Camara, Fahrina Kumar, Harriet Tanner, Blake Aponte

## 2024-04-11 NOTE — PROGRESS NOTE ADULT - PROVIDER SPECIALTY LIST ADULT
Gastroenterology
Hospitalist
Surgery
Hospitalist
Infectious Disease
Gastroenterology
Gastroenterology
Surgery
Gastroenterology
Gastroenterology
Hospitalist
Hospitalist

## 2024-04-11 NOTE — DISCHARGE NOTE NURSING/CASE MANAGEMENT/SOCIAL WORK - NSPROMEDSBROUGHTTOHOSP_GEN_A_NUR
Call Attempt #: 2    Phone call attempt to schedule patient for breast cancer screening.  Unable to reach patient.  Voicemail left for patient.  
no

## 2024-04-11 NOTE — DISCHARGE NOTE NURSING/CASE MANAGEMENT/SOCIAL WORK - PATIENT PORTAL LINK FT
You can access the FollowMyHealth Patient Portal offered by [facility] by registering at the following website: [email or web address]. By joining [facility] FollowMyHealth portal, you will also be able to view your health information using other applications (apps) compatible with our system.

## 2024-04-11 NOTE — PROGRESS NOTE ADULT - SUBJECTIVE AND OBJECTIVE BOX
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  /  ALT  20  /  AlkPhos  62  04-08      Urinalysis Basic - ( 09 Apr 2024 07:43 )    Color: x / Appearance: x / SG: x / pH: x  Gluc: 81 mg/dL / Ketone: x  / Bili: x / Urobili: x   Blood: x / Protein: x / Nitrite: x   Leuk Esterase: x / RBC: x / WBC x   Sq Epi: x / Non Sq Epi: x / Bacteria: x  
[facility]  Coy Estrada MD PhD, Alyx Tanner MD, Patricia Brunson MD, Kamille Almodovar MD, Kemar Patterson MD  and providing coverage with Mauricio Doyle MD  Providing Infectious Disease Consultations at [facility], [facility], [facility]'s    Office# [phone] to schedule follow up appointments  Answering Service for urgent calls or New Consults [phone]  Cell# to text for urgent issues Coy Estrada [phone]     infectious diseases progress note:    NAIDA FAM is a 63y y. o. Female patient    Overnight and events of the last 24hrs reviewed    Allergies    penicillin (Hives)    Intolerances        ANTIBIOTICS/RELEVANT:  antimicrobials    immunologic:  influenza   Vaccine 0.5 milliLiter(s) IntraMuscular once    OTHER:  acetaminophen     Tablet .. 650 milliGRAM(s) Oral every 6 hours PRN  aluminum hydroxide/magnesium hydroxide/simethicone Suspension 30 milliLiter(s) Oral every 6 hours PRN  lactulose Syrup 10 Gram(s) Oral every 6 hours  levothyroxine 100 MICROGram(s) Oral daily  metoclopramide Injectable 5 milliGRAM(s) IV Push every 6 hours PRN  ondansetron Injectable 4 milliGRAM(s) IV Push every 6 hours PRN  polyethylene glycol 3350 17 Gram(s) Oral daily  senna 1 Tablet(s) Oral at bedtime  sodium chloride 0.9%. 1000 milliLiter(s) IV Continuous <Continuous>      Objective:  Vital Signs Last 24 Hrs  T(C): 36.4 (08 Apr 2024 11:43), Max: 36.8 (08 Apr 2024 04:52)  T(F): 97.5 (08 Apr 2024 11:43), Max: 98.3 (08 Apr 2024 04:52)  HR: 76 (08 Apr 2024 11:43) (67 - 76)  BP: 101/61 (08 Apr 2024 11:43) (101/61 - 119/69)  BP(mean): --  RR: 18 (08 Apr 2024 11:43) (18 - 18)  SpO2: 96% (08 Apr 2024 11:43) (92% - 96%)    Parameters below as of 08 Apr 2024 11:43  Patient On (Oxygen Delivery Method): room air        T(C): 36.4 (04-08-24 @ 11:43), Max: 36.8 (04-08-24 @ 04:52)  T(C): 36.4 (04-08-24 @ 11:43), Max: 37 (04-06-24 @ 00:00)  T(C): 36.4 (04-08-24 @ 11:43), Max: 37 (04-06-24 @ 00:00)    PHYSICAL EXAM:  HEENT: NC atraumatic  Neck: supple  Respiratory: no accessory muscle use, breathing comfortably  Cardiovascular: distant  Gastrointestinal: normal appearing, nondistended  Extremities: no clubbing, no cyanosis,        LABS:                          11.2   17.60 )-----------( 332      ( 08 Apr 2024 06:57 )             34.0       WBC  17.60 04-08 @ 06:57  19.74 04-07 @ 22:30  22.60 04-07 @ 07:18  23.43 04-05 @ 18:30      04-08    142  |  109<H>  |  11  ----------------------------<  77  3.7   |  26  |  0.52    Ca    8.0<L>      08 Apr 2024 06:57  Phos  1.8     04-08  Mg     2.2     04-08    TPro  5.8<L>  /  Alb  2.5<L>  /  TBili  0.5  /  DBili  x   /  AST  21  /  ALT  20  /  AlkPhos  62  04-08      Creatinine: 0.52 mg/dL (04-08-24 @ 06:57)  Creatinine: 0.54 mg/dL (04-07-24 @ 07:18)  Creatinine: 0.75 mg/dL (04-05-24 @ 18:30)        Urinalysis Basic - ( 08 Apr 2024 06:57 )    Color: x / Appearance: x / SG: x / pH: x  Gluc: 77 mg/dL / Ketone: x  / Bili: x / Urobili: x   Blood: x / Protein: x / Nitrite: x   Leuk Esterase: x / RBC: x / WBC x   Sq Epi: x / Non Sq Epi: x / Bacteria: x            INFLAMMATORY MARKERS      MICROBIOLOGY:              RADIOLOGY & ADDITIONAL STUDIES:  
Patient is a 63y old  Female who presents with a chief complaint of abdominal pain (07 Apr 2024 11:23)    Date of servie : 04-07-24 @ 11:41  INTERVAL HPI/OVERNIGHT EVENTS:  T(C): 36.5 (04-07-24 @ 11:22), Max: 36.7 (04-06-24 @ 20:08)  HR: 84 (04-07-24 @ 11:22) (68 - 84)  BP: 139/80 (04-07-24 @ 11:22) (116/70 - 139/80)  RR: 18 (04-07-24 @ 11:22) (17 - 20)  SpO2: 91% (04-07-24 @ 11:22) (91% - 93%)  Wt(kg): --  I&O's Summary      LABS:                        12.0   22.60 )-----------( 333      ( 07 Apr 2024 07:18 )             35.9     04-07    140  |  108  |  16  ----------------------------<  115<H>  3.5   |  22  |  0.54    Ca    7.7<L>      07 Apr 2024 07:18    TPro  6.3  /  Alb  2.7<L>  /  TBili  0.6  /  DBili  x   /  AST  22  /  ALT  24  /  AlkPhos  68  04-07      Urinalysis Basic - ( 07 Apr 2024 07:18 )    Color: x / Appearance: x / SG: x / pH: x  Gluc: 115 mg/dL / Ketone: x  / Bili: x / Urobili: x   Blood: x / Protein: x / Nitrite: x   Leuk Esterase: x / RBC: x / WBC x   Sq Epi: x / Non Sq Epi: x / Bacteria: x      CAPILLARY BLOOD GLUCOSE            Urinalysis Basic - ( 07 Apr 2024 07:18 )    Color: x / Appearance: x / SG: x / pH: x  Gluc: 115 mg/dL / Ketone: x  / Bili: x / Urobili: x   Blood: x / Protein: x / Nitrite: x   Leuk Esterase: x / RBC: x / WBC x   Sq Epi: x / Non Sq Epi: x / Bacteria: x        MEDICATIONS  (STANDING):  influenza   Vaccine 0.5 milliLiter(s) IntraMuscular once  lactulose Syrup 10 Gram(s) Oral every 6 hours  levothyroxine 100 MICROGram(s) Oral daily  polyethylene glycol 3350 17 Gram(s) Oral daily  senna 1 Tablet(s) Oral at bedtime  sodium chloride 0.9%. 1000 milliLiter(s) (75 mL/Hr) IV Continuous <Continuous>    MEDICATIONS  (PRN):  acetaminophen     Tablet .. 650 milliGRAM(s) Oral every 6 hours PRN Mild Pain (1 - 3)  aluminum hydroxide/magnesium hydroxide/simethicone Suspension 30 milliLiter(s) Oral every 6 hours PRN Dyspepsia  metoclopramide Injectable 5 milliGRAM(s) IV Push every 6 hours PRN nausea  ondansetron Injectable 4 milliGRAM(s) IV Push every 6 hours PRN Nausea and/or Vomiting          PHYSICAL EXAM:  GENERAL: NAD, well-groomed, well-developed  HEAD:  Atraumatic, Normocephalic  CHEST/LUNG: Clear to percussion bilaterally; No rales, rhonchi, wheezing, or rubs  HEART: Regular rate and rhythm; No murmurs, rubs, or gallops  ABDOMEN: Soft, Nontender, Nondistended; Bowel sounds present  EXTREMITIES:  2+ Peripheral Pulses, No clubbing, cyanosis, or edema  LYMPH: No lymphadenopathy noted  SKIN: No rashes or lesions    Care Discussed with Consultants/Other Providers [ ] YES  [ ] NO
Patient is a 63y old  Female who presents with a chief complaint of abdominal pain (07 Apr 2024 11:40)    Date of servie : 04-08-24 @ 08:17  INTERVAL HPI/OVERNIGHT EVENTS:  T(C): 36.8 (04-08-24 @ 04:52), Max: 36.8 (04-08-24 @ 04:52)  HR: 67 (04-08-24 @ 04:52) (67 - 84)  BP: 111/67 (04-08-24 @ 04:52) (111/67 - 139/80)  RR: 18 (04-08-24 @ 04:52) (18 - 18)  SpO2: 92% (04-08-24 @ 04:52) (91% - 92%)  Wt(kg): --  I&O's Summary      LABS:                        11.2   17.60 )-----------( 332      ( 08 Apr 2024 06:57 )             34.0     04-08    142  |  109<H>  |  11  ----------------------------<  77  3.7   |  26  |  0.52    Ca    8.0<L>      08 Apr 2024 06:57  Phos  1.8     04-08  Mg     2.2     04-08    TPro  5.8<L>  /  Alb  2.5<L>  /  TBili  0.5  /  DBili  x   /  AST  21  /  ALT  20  /  AlkPhos  62  04-08      Urinalysis Basic - ( 08 Apr 2024 06:57 )    Color: x / Appearance: x / SG: x / pH: x  Gluc: 77 mg/dL / Ketone: x  / Bili: x / Urobili: x   Blood: x / Protein: x / Nitrite: x   Leuk Esterase: x / RBC: x / WBC x   Sq Epi: x / Non Sq Epi: x / Bacteria: x      CAPILLARY BLOOD GLUCOSE            Urinalysis Basic - ( 08 Apr 2024 06:57 )    Color: x / Appearance: x / SG: x / pH: x  Gluc: 77 mg/dL / Ketone: x  / Bili: x / Urobili: x   Blood: x / Protein: x / Nitrite: x   Leuk Esterase: x / RBC: x / WBC x   Sq Epi: x / Non Sq Epi: x / Bacteria: x        MEDICATIONS  (STANDING):  influenza   Vaccine 0.5 milliLiter(s) IntraMuscular once  lactulose Syrup 10 Gram(s) Oral every 6 hours  levothyroxine 100 MICROGram(s) Oral daily  polyethylene glycol 3350 17 Gram(s) Oral daily  senna 1 Tablet(s) Oral at bedtime  sodium chloride 0.9%. 1000 milliLiter(s) (75 mL/Hr) IV Continuous <Continuous>    MEDICATIONS  (PRN):  acetaminophen     Tablet .. 650 milliGRAM(s) Oral every 6 hours PRN Mild Pain (1 - 3)  aluminum hydroxide/magnesium hydroxide/simethicone Suspension 30 milliLiter(s) Oral every 6 hours PRN Dyspepsia  metoclopramide Injectable 5 milliGRAM(s) IV Push every 6 hours PRN nausea  ondansetron Injectable 4 milliGRAM(s) IV Push every 6 hours PRN Nausea and/or Vomiting          PHYSICAL EXAM:  GENERAL: NAD, well-groomed, well-developed  HEAD:  Atraumatic, Normocephalic  CHEST/LUNG: Clear to percussion bilaterally; No rales, rhonchi, wheezing, or rubs  HEART: Regular rate and rhythm; No murmurs, rubs, or gallops  ABDOMEN: Soft, Nontender, Nondistended; Bowel sounds present  EXTREMITIES:  2+ Peripheral Pulses, No clubbing, cyanosis, or edema  LYMPH: No lymphadenopathy noted  SKIN: No rashes or lesions    Care Discussed with Consultants/Other Providers [ ] YES  [ ] NO
SUBJECTIVE:  Patient seen and examined at bedside.  nausea/vomiting overnight. Pt admits to bowel movement last night.  Patient denies any fevers, chills, chest pain, shortness of breath.    Vital Signs Last 24 Hrs  T(C): 36.7 (07 Apr 2024 04:38), Max: 36.7 (06 Apr 2024 20:08)  T(F): 98.1 (07 Apr 2024 04:38), Max: 98.1 (07 Apr 2024 04:38)  HR: 81 (07 Apr 2024 04:38) (68 - 81)  BP: 139/73 (07 Apr 2024 04:38) (116/70 - 139/73)  BP(mean): --  RR: 20 (07 Apr 2024 04:38) (17 - 20)  SpO2: 93% (07 Apr 2024 04:38) (92% - 93%)    Parameters below as of 07 Apr 2024 04:38  Patient On (Oxygen Delivery Method): room air        PHYSICAL EXAM:  GENERAL: No acute distress, well-developed  HEAD:  Atraumatic, Normocephalic  ABDOMEN: Soft, minimally-tender, non-distended  NEUROLOGY: responding appropriately, no focal deficits    I&O's Summary    I&O's Detail    MEDICATIONS  (STANDING):  influenza   Vaccine 0.5 milliLiter(s) IntraMuscular once  lactulose Syrup 10 Gram(s) Oral every 6 hours  levothyroxine 100 MICROGram(s) Oral daily  polyethylene glycol 3350 17 Gram(s) Oral daily  senna 1 Tablet(s) Oral at bedtime  sodium chloride 0.9%. 1000 milliLiter(s) (75 mL/Hr) IV Continuous <Continuous>    MEDICATIONS  (PRN):  acetaminophen     Tablet .. 650 milliGRAM(s) Oral every 6 hours PRN Mild Pain (1 - 3)  aluminum hydroxide/magnesium hydroxide/simethicone Suspension 30 milliLiter(s) Oral every 6 hours PRN Dyspepsia  metoclopramide Injectable 5 milliGRAM(s) IV Push every 6 hours PRN nausea  ondansetron Injectable 4 milliGRAM(s) IV Push every 6 hours PRN Nausea and/or Vomiting    LABS:                        12.0   22.60 )-----------( 333      ( 07 Apr 2024 07:18 )             35.9     04-07    140  |  108  |  16  ----------------------------<  115<H>  3.5   |  22  |  0.54    Ca    7.7<L>      07 Apr 2024 07:18    TPro  6.3  /  Alb  2.7<L>  /  TBili  0.6  /  DBili  x   /  AST  22  /  ALT  24  /  AlkPhos  68  04-07      Urinalysis Basic - ( 07 Apr 2024 07:18 )    Color: x / Appearance: x / SG: x / pH: x  Gluc: 115 mg/dL / Ketone: x  / Bili: x / Urobili: x   Blood: x / Protein: x / Nitrite: x   Leuk Esterase: x / RBC: x / WBC x   Sq Epi: x / Non Sq Epi: x / Bacteria: x      RADIOLOGY & ADDITIONAL STUDIES:    ASSESSMENT:  63 year old female with PMHx IBS, endometriosis (underwent multiple diagnostic laps w/ Dr. Johnson @ [facility]), hypothyroidism, recent right shoulder arthroscopy 10 days ago presenting with abdominal pain, nausea, vomiting, constipation likely 2/2 to opiate use following recent surgery.  VSS.  Labs significant for leukocytosis 23k.  Abdominal exam with tenderness in LLQ.  CT scan demonstrating Moderate circumferential mural thickening of the rectosigmoid colon with upstream dilatation of the colon diffusely filled with stool whereby differentials include moderate proctitis with ileus/partial obstruction, and LBO cannot be definitely excluded.  General surgery consulted for evaluation.    PLAN:  - At this time, no indication for acute general surgical intervention  - Continue aggressive bowel regimen  - serial abdominal exams  - GI reccs noted  - Bowel rest, NPO for now  - Encouraged IS use/OOB/Ambulation    Surgical Team Contact Information  Spectralink: Ext: [phone] or [phone]
64 yo fem constipated/stool impaction, drinking bowel prep, no flatus    Abd soft, LLQ tend          04-06-24 @ 09:17    Vital Signs Last 24 Hrs  T(C): 36.7 (06 Apr 2024 08:03), Max: 37 (06 Apr 2024 00:00)  T(F): 98 (06 Apr 2024 08:03), Max: 98.6 (06 Apr 2024 00:00)  HR: 66 (06 Apr 2024 08:03) (66 - 83)  BP: 96/58 (06 Apr 2024 08:03) (96/58 - 136/79)  BP(mean): --  RR: 17 (06 Apr 2024 08:03) (15 - 18)  SpO2: 94% (06 Apr 2024 08:03) (94% - 99%)    Parameters below as of 06 Apr 2024 06:33  Patient On (Oxygen Delivery Method): room air                              13.9   23.43 )-----------( 399      ( 05 Apr 2024 18:30 )             41.9       04-05    138  |  101  |  17  ----------------------------<  113<H>  3.4<L>   |  26  |  0.75    Ca    9.2      05 Apr 2024 18:30    TPro  8.1  /  Alb  3.7  /  TBili  0.4  /  DBili  x   /  AST  20  /  ALT  22  /  AlkPhos  76  04-05      LIVER FUNCTIONS - ( 05 Apr 2024 18:30 )  Alb: 3.7 g/dL / Pro: 8.1 g/dL / ALK PHOS: 76 U/L / ALT: 22 U/L / AST: 20 U/L / GGT: x             MEDICATIONS  (STANDING):  aztreonam  IVPB 1000 milliGRAM(s) IV Intermittent every 8 hours  glycerin Suppository - Adult 1 Suppository(s) Rectal Once  levothyroxine 100 MICROGram(s) Oral daily  metroNIDAZOLE  IVPB 500 milliGRAM(s) IV Intermittent every 8 hours  naloxegol 25 milliGRAM(s) Oral once    MEDICATIONS  (PRN):  acetaminophen     Tablet .. 650 milliGRAM(s) Oral every 6 hours PRN Mild Pain (1 - 3)      MEDICATIONS  (STANDING):  aztreonam  IVPB 1000 milliGRAM(s) IV Intermittent every 8 hours  glycerin Suppository - Adult 1 Suppository(s) Rectal Once  levothyroxine 100 MICROGram(s) Oral daily  metroNIDAZOLE  IVPB 500 milliGRAM(s) IV Intermittent every 8 hours  naloxegol 25 milliGRAM(s) Oral once

## 2024-04-11 NOTE — DISCHARGE NOTE PROVIDER - HOSPITAL COURSE
63 y female with PMHx of hypothyroidism, endometriosis (multiple laparoscopic procedures), enlarged aorta, IBS, recent shoulder surgery 10 days ago, presents to ED with complaint of lower abdominal pain and vomiting for 1 day. Patient states she has not had a bowel movement since surgery 10 days ago, has been taking Vicodin and Toradol for pain. Patient attempted laxative therapy but was not successful. Patient vomit x2 today, yellow in color. Patient denies fever, chest pain, or SOB.      Stercoliths sec to constipation, sbo   - sp  Golytely  - GI and surgery following  - avoid opoids  - sp relistor  - tolerating diet       Hypothyroid  - cw synthroid       dc with outpt GI fu

## 2024-05-22 ENCOUNTER — APPOINTMENT (OUTPATIENT)
Dept: ORTHOPEDIC SURGERY | Facility: CLINIC | Age: 64
End: 2024-05-22
Payer: COMMERCIAL

## 2024-05-22 VITALS — HEIGHT: 63 IN | BODY MASS INDEX: 26.58 KG/M2 | WEIGHT: 150 LBS

## 2024-05-22 DIAGNOSIS — S43.51XD SPRAIN OF RIGHT ACROMIOCLAVICULAR JOINT, SUBSEQUENT ENCOUNTER: ICD-10-CM

## 2024-05-22 DIAGNOSIS — M25.611 STIFFNESS OF RIGHT SHOULDER, NOT ELSEWHERE CLASSIFIED: ICD-10-CM

## 2024-05-22 PROCEDURE — 99024 POSTOP FOLLOW-UP VISIT: CPT

## 2024-05-22 RX ORDER — METHYLPREDNISOLONE 4 MG/1
4 TABLET ORAL
Qty: 1 | Refills: 0 | Status: ACTIVE | COMMUNITY
Start: 2024-05-22 | End: 1900-01-01

## 2024-05-22 NOTE — ASSESSMENT
[FreeTextEntry1] : We reviewed her concerns. MDP is prescribed. PT will continue.  If symptoms persist, an injection is planned.  She will follow up in 6 weeks.  Questions answered.   Patient was seen by Dr. Mandeep Chand. Patient was seen by Estrellita CALVERT under the supervision of Dr. Mandeep Chand. Progress note was completed by Estrellita CALVERT. Entered by Elena Antonio acting as scribe.
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
## 2024-05-22 NOTE — HISTORY OF PRESENT ILLNESS
[8] : 8 [7] : 7 [Stabbing] : stabbing [Constant] : constant [Household chores] : household chores [Leisure] : leisure [Sleep] : sleep [Nothing helps with pain getting better] : Nothing helps with pain getting better [Lying in bed] : lying in bed [Not working due to injury] : Work status: not working due to injury [de-identified] : RO COMBS 9/6/2023: Post op right shoulder, DOS 3/26/24. Patient is here with worsening right shoulder pain. Patient also tripped 2 days ago and grabbed onto something to prevent her from falling.  [] : no [FreeTextEntry1] : Right shoulder [FreeTextEntry7] : occasionally to her hand [de-identified] : Physical therapy 2 x a week, HEP.

## 2024-05-22 NOTE — PHYSICAL EXAM
[Right] : right shoulder [Supine] : supine [Severe] : severe [Moderate] : moderate [] : no sensory deficits [de-identified] : Strength was not assessed. [TWNoteComboBox4] : passive forward flexion 120 degrees [de-identified] : external rotation 30 degrees

## 2024-06-26 ENCOUNTER — APPOINTMENT (OUTPATIENT)
Dept: ORTHOPEDIC SURGERY | Facility: CLINIC | Age: 64
End: 2024-06-26

## 2024-06-26 VITALS — BODY MASS INDEX: 25.69 KG/M2 | HEIGHT: 63 IN | WEIGHT: 145 LBS

## 2024-06-26 DIAGNOSIS — M75.21 BICIPITAL TENDINITIS, RIGHT SHOULDER: ICD-10-CM

## 2024-06-26 DIAGNOSIS — M75.41 IMPINGEMENT SYNDROME OF RIGHT SHOULDER: ICD-10-CM

## 2024-06-26 PROCEDURE — 20611 DRAIN/INJ JOINT/BURSA W/US: CPT | Mod: RT

## 2024-06-26 PROCEDURE — 99214 OFFICE O/P EST MOD 30 MIN: CPT | Mod: 25

## 2024-06-26 RX ORDER — CELECOXIB 200 MG/1
200 CAPSULE ORAL TWICE DAILY
Qty: 60 | Refills: 0 | Status: ACTIVE | COMMUNITY
Start: 2024-06-26 | End: 2024-07-26

## 2024-08-07 ENCOUNTER — APPOINTMENT (OUTPATIENT)
Dept: ORTHOPEDIC SURGERY | Facility: CLINIC | Age: 64
End: 2024-08-07

## 2024-08-07 ENCOUNTER — NON-APPOINTMENT (OUTPATIENT)
Age: 64
End: 2024-08-07

## 2024-08-07 PROCEDURE — 99214 OFFICE O/P EST MOD 30 MIN: CPT

## 2024-08-07 NOTE — PHYSICAL EXAM
[Right] : right shoulder [Sitting] : sitting [Moderate] : moderate [Mild] : mild [] : no sensory deficits [de-identified] : Strength was not assessed. [TWNoteComboBox4] : passive forward flexion 150 degrees [de-identified] : external rotation 45 degrees

## 2024-08-07 NOTE — ASSESSMENT
[FreeTextEntry1] : We discussed her issues. She did lose time after her hospitalization. As the Celebrex is not helping, diclofenac is prescribed. She will need to cont PT. Cont HEP. Caution reviewed. Questions answered.  Patient seen by Mandeep Chand M.D.
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
## 2024-08-07 NOTE — HISTORY OF PRESENT ILLNESS
[8] : 8 [0] : 0 [Dull/Aching] : dull/aching [Sharp] : sharp [Constant] : constant [Household chores] : household chores [Leisure] : leisure [Sleep] : sleep [Ice] : ice [Heat] : heat [Lying in bed] : lying in bed [Not working due to injury] : Work status: not working due to injury [de-identified] : nf f/u Rshoulder. pain is the same. in PT and taking celebrex [] : no [FreeTextEntry1] : Right shoulder [FreeTextEntry7] : to her neck [de-identified] : lifting, certain movements [de-identified] : Physical therapy 2 x a week, HEP

## 2024-08-22 PROCEDURE — 74019 RADEX ABDOMEN 2 VIEWS: CPT

## 2024-08-22 PROCEDURE — 74177 CT ABD & PELVIS W/CONTRAST: CPT | Mod: MC

## 2024-08-22 PROCEDURE — 93005 ELECTROCARDIOGRAM TRACING: CPT

## 2024-08-22 PROCEDURE — 85025 COMPLETE CBC W/AUTO DIFF WBC: CPT

## 2024-08-22 PROCEDURE — 84100 ASSAY OF PHOSPHORUS: CPT

## 2024-08-22 PROCEDURE — 83605 ASSAY OF LACTIC ACID: CPT

## 2024-08-22 PROCEDURE — 87086 URINE CULTURE/COLONY COUNT: CPT

## 2024-08-22 PROCEDURE — 85027 COMPLETE CBC AUTOMATED: CPT

## 2024-08-22 PROCEDURE — 80048 BASIC METABOLIC PNL TOTAL CA: CPT

## 2024-08-22 PROCEDURE — 87040 BLOOD CULTURE FOR BACTERIA: CPT

## 2024-08-22 PROCEDURE — 81001 URINALYSIS AUTO W/SCOPE: CPT

## 2024-08-22 PROCEDURE — 36415 COLL VENOUS BLD VENIPUNCTURE: CPT

## 2024-08-22 PROCEDURE — 83735 ASSAY OF MAGNESIUM: CPT

## 2024-08-22 PROCEDURE — 74018 RADEX ABDOMEN 1 VIEW: CPT

## 2024-08-22 PROCEDURE — 86803 HEPATITIS C AB TEST: CPT

## 2024-08-22 PROCEDURE — 99285 EMERGENCY DEPT VISIT HI MDM: CPT

## 2024-08-22 PROCEDURE — 80053 COMPREHEN METABOLIC PANEL: CPT

## 2024-09-18 ENCOUNTER — APPOINTMENT (OUTPATIENT)
Dept: ORTHOPEDIC SURGERY | Facility: CLINIC | Age: 64
End: 2024-09-18
Payer: COMMERCIAL

## 2024-09-18 VITALS — BODY MASS INDEX: 28.35 KG/M2 | HEIGHT: 63 IN | WEIGHT: 160 LBS

## 2024-09-18 DIAGNOSIS — M75.41 IMPINGEMENT SYNDROME OF RIGHT SHOULDER: ICD-10-CM

## 2024-09-18 DIAGNOSIS — M25.611 STIFFNESS OF RIGHT SHOULDER, NOT ELSEWHERE CLASSIFIED: ICD-10-CM

## 2024-09-18 DIAGNOSIS — S43.51XD SPRAIN OF RIGHT ACROMIOCLAVICULAR JOINT, SUBSEQUENT ENCOUNTER: ICD-10-CM

## 2024-09-18 DIAGNOSIS — M75.21 BICIPITAL TENDINITIS, RIGHT SHOULDER: ICD-10-CM

## 2024-09-18 PROCEDURE — 99214 OFFICE O/P EST MOD 30 MIN: CPT

## 2024-09-18 RX ORDER — METHYLPREDNISOLONE 4 MG/1
4 TABLET ORAL
Qty: 1 | Refills: 0 | Status: ACTIVE | COMMUNITY
Start: 2024-09-18 | End: 1900-01-01

## 2024-09-18 NOTE — ASSESSMENT
[FreeTextEntry1] : We discussed her issues. Concerns were addressed. A repeat MRI is planned. She will continue HEP. She will hold PT for now.  Medication use discussed.  MDP is prescribed.  Questions answered.   Patient seen by Dr. Mandeep Chand, who determined the assessment and plan. Estrellita Weir PAC was present for and participated in aspects of the office encounter. IElena, am scribing for Dr. Mandeep Chand in his presence for the chief complaint, physical exam, studies, assessment, and/or plan.

## 2024-09-18 NOTE — PHYSICAL EXAM
[Right] : right shoulder [Sitting] : sitting [Moderate] : moderate [] : no sensory deficits [FreeTextEntry9] : There is a positive arc of pain. [de-identified] : Strength was not assessed. [TWNoteComboBox4] : passive forward flexion 160 degrees [TWNoteComboBox6] : internal rotation L4 [de-identified] : external rotation 45 degrees
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
## 2024-09-18 NOTE — HISTORY OF PRESENT ILLNESS
[9] : 9 [Sharp] : sharp [Constant] : constant [Household chores] : household chores [Leisure] : leisure [Sleep] : sleep [Ice] : ice [Heat] : heat [Physical therapy] : physical therapy [Not working due to injury] : Work status: not working due to injury [de-identified] : RO ROBERTA 9/6/23: Patient is here with worsening right shoulder.  [] : no [FreeTextEntry1] : Right shoulder [FreeTextEntry7] : to her bicep [de-identified] : certain motions [de-identified] : Physical therapy 2 x a week, HEP

## 2024-09-20 NOTE — ED PROVIDER NOTE - NSICDXPASTSURGICALHX_GEN_ALL_CORE_FT
There are no Wet Read(s) to document. PAST SURGICAL HISTORY:  H/O  section     H/O nasal septoplasty     H/O radiofrequency ablation (RFA) of nerve of lumbar spine     S/P arthroscopy of left shoulder     S/P cervical disc replacement

## 2024-09-26 ENCOUNTER — APPOINTMENT (OUTPATIENT)
Dept: MRI IMAGING | Facility: CLINIC | Age: 64
End: 2024-09-26

## 2024-09-26 PROCEDURE — 73221 MRI JOINT UPR EXTREM W/O DYE: CPT | Mod: 1L,RT

## 2024-10-02 ENCOUNTER — APPOINTMENT (OUTPATIENT)
Dept: ORTHOPEDIC SURGERY | Facility: CLINIC | Age: 64
End: 2024-10-02
Payer: COMMERCIAL

## 2024-10-02 DIAGNOSIS — M75.41 IMPINGEMENT SYNDROME OF RIGHT SHOULDER: ICD-10-CM

## 2024-10-02 DIAGNOSIS — S43.51XD SPRAIN OF RIGHT ACROMIOCLAVICULAR JOINT, SUBSEQUENT ENCOUNTER: ICD-10-CM

## 2024-10-02 DIAGNOSIS — M75.21 BICIPITAL TENDINITIS, RIGHT SHOULDER: ICD-10-CM

## 2024-10-02 DIAGNOSIS — M25.611 STIFFNESS OF RIGHT SHOULDER, NOT ELSEWHERE CLASSIFIED: ICD-10-CM

## 2024-10-02 PROCEDURE — 99214 OFFICE O/P EST MOD 30 MIN: CPT

## 2024-10-02 NOTE — DATA REVIEWED
[FreeTextEntry1] : MRI R SHOULDER [facility] 9/26/24: I reviewed the images and my interpretation is that there is subacromial inflammation into the AC joint. There are no major cuff tears. The biceps tenodesis is evident. The muscle is decent.

## 2024-10-02 NOTE — PHYSICAL EXAM
[Right] : right shoulder [Sitting] : sitting [Moderate] : moderate [] : no sensory deficits [FreeTextEntry9] : There is a positive arc of pain. [de-identified] : Strength was not assessed. [TWNoteComboBox4] : passive forward flexion 160 degrees [TWNoteComboBox6] : internal rotation L4 [de-identified] : external rotation 45 degrees

## 2024-10-02 NOTE — HISTORY OF PRESENT ILLNESS
[de-identified] : NF DOA 9/6/23: Patient is here for MRI results of her right shoulder. [FreeTextEntry1] : Right shoulder

## 2024-10-02 NOTE — ASSESSMENT
[FreeTextEntry1] : We discussed the findings.  Aleve 440mg use discussed She will continue with HEP. She will call for follow up based on her symptoms.  Further injections could be considered.  Questions answered.   Patient seen by Dr. Mandeep Chand, who determined the assessment and plan. Estrellita CALVERT was present for and participated in aspects of the office encounter. IElena, am scribing for Dr. Mandeep Chand in his presence for the chief complaint, physical exam, studies, assessment, and/or plan.
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
## 2024-11-30 ENCOUNTER — INPATIENT (INPATIENT)
Facility: HOSPITAL | Age: 64
LOS: 5 days | Discharge: HOME | DRG: 552 | End: 2024-12-06
Attending: INTERNAL MEDICINE | Admitting: INTERNAL MEDICINE
Payer: COMMERCIAL

## 2024-11-30 VITALS
HEIGHT: 63 IN | TEMPERATURE: 98 F | HEART RATE: 90 BPM | WEIGHT: 149.91 LBS | DIASTOLIC BLOOD PRESSURE: 69 MMHG | RESPIRATION RATE: 20 BRPM | OXYGEN SATURATION: 100 % | SYSTOLIC BLOOD PRESSURE: 146 MMHG

## 2024-11-30 DIAGNOSIS — Z98.890 OTHER SPECIFIED POSTPROCEDURAL STATES: Chronic | ICD-10-CM

## 2024-11-30 DIAGNOSIS — Z98.891 HISTORY OF UTERINE SCAR FROM PREVIOUS SURGERY: Chronic | ICD-10-CM

## 2024-11-30 LAB
ALBUMIN SERPL ELPH-MCNC: 3.8 G/DL — SIGNIFICANT CHANGE UP (ref 3.3–5)
ALP SERPL-CCNC: 88 U/L — SIGNIFICANT CHANGE UP (ref 40–120)
ALT FLD-CCNC: 23 U/L — SIGNIFICANT CHANGE UP (ref 12–78)
ANION GAP SERPL CALC-SCNC: 3 MMOL/L — LOW (ref 5–17)
AST SERPL-CCNC: 15 U/L — SIGNIFICANT CHANGE UP (ref 15–37)
BASOPHILS # BLD AUTO: 0.05 K/UL — SIGNIFICANT CHANGE UP (ref 0–0.2)
BASOPHILS NFR BLD AUTO: 0.3 % — SIGNIFICANT CHANGE UP (ref 0–2)
BILIRUB SERPL-MCNC: 0.5 MG/DL — SIGNIFICANT CHANGE UP (ref 0.2–1.2)
BUN SERPL-MCNC: 19 MG/DL — SIGNIFICANT CHANGE UP (ref 7–23)
CALCIUM SERPL-MCNC: 10.1 MG/DL — SIGNIFICANT CHANGE UP (ref 8.5–10.1)
CHLORIDE SERPL-SCNC: 107 MMOL/L — SIGNIFICANT CHANGE UP (ref 96–108)
CO2 SERPL-SCNC: 27 MMOL/L — SIGNIFICANT CHANGE UP (ref 22–31)
CREAT SERPL-MCNC: 0.78 MG/DL — SIGNIFICANT CHANGE UP (ref 0.5–1.3)
EGFR: 86 ML/MIN/1.73M2 — SIGNIFICANT CHANGE UP
EOSINOPHIL # BLD AUTO: 0.09 K/UL — SIGNIFICANT CHANGE UP (ref 0–0.5)
EOSINOPHIL NFR BLD AUTO: 0.6 % — SIGNIFICANT CHANGE UP (ref 0–6)
GLUCOSE SERPL-MCNC: 104 MG/DL — HIGH (ref 70–99)
HCT VFR BLD CALC: 39 % — SIGNIFICANT CHANGE UP (ref 34.5–45)
HGB BLD-MCNC: 13.3 G/DL — SIGNIFICANT CHANGE UP (ref 11.5–15.5)
IMM GRANULOCYTES NFR BLD AUTO: 0.5 % — SIGNIFICANT CHANGE UP (ref 0–0.9)
LIDOCAIN IGE QN: 8 U/L — LOW (ref 13–75)
LYMPHOCYTES # BLD AUTO: 16.5 % — SIGNIFICANT CHANGE UP (ref 13–44)
LYMPHOCYTES # BLD AUTO: 2.43 K/UL — SIGNIFICANT CHANGE UP (ref 1–3.3)
MCHC RBC-ENTMCNC: 31.3 PG — SIGNIFICANT CHANGE UP (ref 27–34)
MCHC RBC-ENTMCNC: 34.1 G/DL — SIGNIFICANT CHANGE UP (ref 32–36)
MCV RBC AUTO: 91.8 FL — SIGNIFICANT CHANGE UP (ref 80–100)
MONOCYTES # BLD AUTO: 0.89 K/UL — SIGNIFICANT CHANGE UP (ref 0–0.9)
MONOCYTES NFR BLD AUTO: 6 % — SIGNIFICANT CHANGE UP (ref 2–14)
NEUTROPHILS # BLD AUTO: 11.22 K/UL — HIGH (ref 1.8–7.4)
NEUTROPHILS NFR BLD AUTO: 76.1 % — SIGNIFICANT CHANGE UP (ref 43–77)
NRBC # BLD: 0 /100 WBCS — SIGNIFICANT CHANGE UP (ref 0–0)
PLATELET # BLD AUTO: 306 K/UL — SIGNIFICANT CHANGE UP (ref 150–400)
POTASSIUM SERPL-MCNC: 4.4 MMOL/L — SIGNIFICANT CHANGE UP (ref 3.5–5.3)
POTASSIUM SERPL-SCNC: 4.4 MMOL/L — SIGNIFICANT CHANGE UP (ref 3.5–5.3)
PROT SERPL-MCNC: 7.9 G/DL — SIGNIFICANT CHANGE UP (ref 6–8.3)
RBC # BLD: 4.25 M/UL — SIGNIFICANT CHANGE UP (ref 3.8–5.2)
RBC # FLD: 12.1 % — SIGNIFICANT CHANGE UP (ref 10.3–14.5)
SODIUM SERPL-SCNC: 137 MMOL/L — SIGNIFICANT CHANGE UP (ref 135–145)
WBC # BLD: 14.75 K/UL — HIGH (ref 3.8–10.5)
WBC # FLD AUTO: 14.75 K/UL — HIGH (ref 3.8–10.5)

## 2024-11-30 PROCEDURE — 99285 EMERGENCY DEPT VISIT HI MDM: CPT

## 2024-11-30 PROCEDURE — 74176 CT ABD & PELVIS W/O CONTRAST: CPT | Mod: 26,MC

## 2024-11-30 PROCEDURE — 72131 CT LUMBAR SPINE W/O DYE: CPT | Mod: 26,MC

## 2024-11-30 RX ORDER — HYDROMORPHONE HYDROCHLORIDE 2 MG/1
0.5 TABLET ORAL ONCE
Refills: 0 | Status: DISCONTINUED | OUTPATIENT
Start: 2024-11-30 | End: 2024-11-30

## 2024-11-30 RX ORDER — SODIUM CHLORIDE 9 MG/ML
1000 INJECTION, SOLUTION INTRAMUSCULAR; INTRAVENOUS; SUBCUTANEOUS ONCE
Refills: 0 | Status: COMPLETED | OUTPATIENT
Start: 2024-11-30 | End: 2024-11-30

## 2024-11-30 RX ORDER — DIAZEPAM 10 MG/1
5 TABLET ORAL ONCE
Refills: 0 | Status: DISCONTINUED | OUTPATIENT
Start: 2024-11-30 | End: 2024-11-30

## 2024-11-30 RX ORDER — DEXAMETHASONE 1.5 MG/1
10 TABLET ORAL ONCE
Refills: 0 | Status: COMPLETED | OUTPATIENT
Start: 2024-11-30 | End: 2024-11-30

## 2024-11-30 RX ORDER — LIDOCAINE 40 MG/G
1 CREAM TOPICAL ONCE
Refills: 0 | Status: COMPLETED | OUTPATIENT
Start: 2024-11-30 | End: 2024-11-30

## 2024-11-30 RX ADMIN — HYDROMORPHONE HYDROCHLORIDE 0.5 MILLIGRAM(S): 2 TABLET ORAL at 21:07

## 2024-11-30 RX ADMIN — Medication 4 MILLIGRAM(S): at 20:30

## 2024-11-30 RX ADMIN — DEXAMETHASONE 10 MILLIGRAM(S): 1.5 TABLET ORAL at 20:37

## 2024-11-30 RX ADMIN — DEXAMETHASONE 102 MILLIGRAM(S): 1.5 TABLET ORAL at 20:05

## 2024-11-30 RX ADMIN — HYDROMORPHONE HYDROCHLORIDE 0.5 MILLIGRAM(S): 2 TABLET ORAL at 22:05

## 2024-11-30 RX ADMIN — Medication 2 MILLIGRAM(S): at 23:54

## 2024-11-30 RX ADMIN — LIDOCAINE 1 PATCH: 40 CREAM TOPICAL at 19:41

## 2024-11-30 RX ADMIN — SODIUM CHLORIDE 1000 MILLILITER(S): 9 INJECTION, SOLUTION INTRAMUSCULAR; INTRAVENOUS; SUBCUTANEOUS at 19:36

## 2024-11-30 RX ADMIN — DIAZEPAM 5 MILLIGRAM(S): 10 TABLET ORAL at 19:38

## 2024-11-30 RX ADMIN — SODIUM CHLORIDE 1000 MILLILITER(S): 9 INJECTION, SOLUTION INTRAMUSCULAR; INTRAVENOUS; SUBCUTANEOUS at 20:57

## 2024-11-30 RX ADMIN — Medication 4 MILLIGRAM(S): at 19:36

## 2024-11-30 NOTE — ED PROVIDER NOTE - CLINICAL SUMMARY MEDICAL DECISION MAKING FREE TEXT BOX
Patient is a 64-year-old female presents to the emergency room with a chief complaint of left gluteal pain.  Past medical history of hypothyroidism known disc disease has had epidurals and ablations in the past last treated about a year ago.  She reports on Wednesday she began to have left-sided gluteal pain which has progressively worsened and is now rating down the leg stopping at about the knee.  She removed today and the pain significantly worsened.  Denies any recent falls or injury.  Denies any fevers chills nausea vomiting chest pain or shortness of breath.  Denies any extremity numbness or weakness denies any loss of bowel or bladder control.  She did have an episode of diarrhea on Tuesday and Thursday.  On exam patient is markably uncomfortable difficult to find a good position.  Normocephalic atraumatic pupils equal round and reactive hearts regular rate lungs clear to auscultation abdomen soft nontender nondistended.  Sensation grossly intact bilateral lower extremities.  Patient presenting to the emergency room with a chief complaint of radicular back pain.  High suspicion for disc pathology.  At this time patient is neurovascularly intact.  Will obtain screening labs check UA urine culture obtain imaging medicate for pain and monitor.  Ultimate clinical disposition will be pending results. Patient is a 64-year-old female presents to the emergency room with a chief complaint of left gluteal pain.  Past medical history of hypothyroidism known disc disease has had epidurals and ablations in the past last treated about a year ago.  She reports on Wednesday she began to have left-sided gluteal pain which has progressively worsened and is now rating down the leg stopping at about the knee.  She removed today and the pain significantly worsened.  Denies any recent falls or injury.  Denies any fevers chills nausea vomiting chest pain or shortness of breath.  Denies any extremity numbness or weakness denies any loss of bowel or bladder control.  She did have an episode of diarrhea on Tuesday and Thursday.  On exam patient is markably uncomfortable difficult to find a good position.  Normocephalic atraumatic pupils equal round and reactive hearts regular rate lungs clear to auscultation abdomen soft nontender nondistended.  Sensation grossly intact bilateral lower extremities.  Patient presenting to the emergency room with a chief complaint of radicular back pain.  High suspicion for disc pathology.  At this time patient is neurovascularly intact.  Will obtain screening labs check UA urine culture obtain imaging medicate for pain and monitor.  Ultimate clinical disposition will be pending results. Independent review of CT imaging reveals 4 mm nonobstructing left renal stone no evidence of UTI multilevel degenerative changes of the lumbar spine.  Patient still in severe pain will need to be admitted for continued pain control remains neurovascularly intact

## 2024-11-30 NOTE — ED PROVIDER NOTE - DIFFERENTIAL DIAGNOSIS
Differential Diagnosis Patient presenting to the emergency room with a chief complaint of radicular back pain.  High suspicion for disc pathology.  At this time patient is neurovascularly intact.  Will obtain screening labs check UA urine culture obtain imaging medicate for pain and monitor.  Ultimate clinical disposition will be pending results.

## 2024-11-30 NOTE — ED ADULT NURSE NOTE - OBJECTIVE STATEMENT
Received from outgoing shift ZACHARY [place] c/o left lower back pain x 4 days that gotten worse today. Denies nausea/ vomiting/ no injury/ trauma/ no fever.

## 2024-11-30 NOTE — ED PROVIDER NOTE - WHICH SHOWED
Independent review of CT imaging reveals 4 mm nonobstructing left renal stone no evidence of UTI multilevel degenerative changes of the lumbar spine.

## 2024-11-30 NOTE — ED ADULT TRIAGE NOTE - CHIEF COMPLAINT QUOTE
64yF  from home to ED triage.. pt c/o severe LT flank, lower back pain radiating both down to buttocks and also to LT lower side.. sharp pain worsens with any movement..  pt also c/o urinary frequency, no burning.. sx progressively worsening since Wednesday

## 2024-11-30 NOTE — ED PROVIDER NOTE - MUSCULOSKELETAL, MLM
Spine appears normal, range of motion is not limited, no muscle or joint tenderness; no deformities, no midline tenderness

## 2024-11-30 NOTE — ED PROVIDER NOTE - OBJECTIVE STATEMENT
65y/o female presents to ED with Left lower back pain. Pt reports pain shoots into leg. Began Wednesday and has been worsening since. Reports significant difficulty with ambulating and pain worse with movement. Denies trauma, fever, abdominal pain, nausea, emesis, dysuria.

## 2024-11-30 NOTE — ED PROVIDER NOTE - PROGRESS NOTE DETAILS
Pain mildly responsive to pain meds, however pain still significant and pt unable to walk/ambulate without severe pain. Will admit for pain control.

## 2024-11-30 NOTE — ED ADULT NURSE NOTE - ED STAT RN HANDOFF DETAILS
Received from ZACHARY [place] patient on bed in severe pain c/o left lower back pain awaiting for meds to be given. Received from ZACHARY [place] patient on bed c/o left lower back pain awaiting for meds to be given.

## 2024-11-30 NOTE — ED PROVIDER NOTE - ATTENDING APP SHARED VISIT CONTRIBUTION OF CARE
Dr waterhouse called and needs a surgical clearance for breast surgery on 8-  She is asking if the visit on 6-13/2022 can be amended to say clear for surgery      Fax [phone]  
see MDM

## 2024-12-01 DIAGNOSIS — M54.9 DORSALGIA, UNSPECIFIED: ICD-10-CM

## 2024-12-01 LAB
APPEARANCE UR: CLEAR — SIGNIFICANT CHANGE UP
BILIRUB UR-MCNC: NEGATIVE — SIGNIFICANT CHANGE UP
COLOR SPEC: YELLOW — SIGNIFICANT CHANGE UP
DIFF PNL FLD: NEGATIVE — SIGNIFICANT CHANGE UP
GLUCOSE UR QL: NEGATIVE MG/DL — SIGNIFICANT CHANGE UP
KETONES UR-MCNC: 15 MG/DL
LEUKOCYTE ESTERASE UR-ACNC: ABNORMAL
NITRITE UR-MCNC: NEGATIVE — SIGNIFICANT CHANGE UP
PH UR: 5.5 — SIGNIFICANT CHANGE UP (ref 5–8)
PROT UR-MCNC: NEGATIVE MG/DL — SIGNIFICANT CHANGE UP
SP GR SPEC: 1.01 — SIGNIFICANT CHANGE UP (ref 1–1.03)
UROBILINOGEN FLD QL: 0.2 MG/DL — SIGNIFICANT CHANGE UP (ref 0.2–1)

## 2024-12-01 PROCEDURE — 93010 ELECTROCARDIOGRAM REPORT: CPT

## 2024-12-01 PROCEDURE — 72148 MRI LUMBAR SPINE W/O DYE: CPT | Mod: 26

## 2024-12-01 RX ORDER — HYDROMORPHONE HYDROCHLORIDE 2 MG/1
0.5 TABLET ORAL ONCE
Refills: 0 | Status: DISCONTINUED | OUTPATIENT
Start: 2024-12-01 | End: 2024-12-01

## 2024-12-01 RX ORDER — LACTULOSE 10 G/15ML
10 SOLUTION ORAL EVERY 24 HOURS
Refills: 0 | Status: DISCONTINUED | OUTPATIENT
Start: 2024-12-01 | End: 2024-12-06

## 2024-12-01 RX ORDER — ACETAMINOPHEN 500MG 500 MG/1
1000 TABLET, COATED ORAL EVERY 8 HOURS
Refills: 0 | Status: DISCONTINUED | OUTPATIENT
Start: 2024-12-01 | End: 2024-12-06

## 2024-12-01 RX ORDER — NALOXEGOL OXALATE 12.5 MG/1
25 TABLET, FILM COATED ORAL DAILY
Refills: 0 | Status: DISCONTINUED | OUTPATIENT
Start: 2024-12-01 | End: 2024-12-01

## 2024-12-01 RX ORDER — METHYLPREDNISOLONE SOD SUCC 125 MG
40 VIAL (EA) INJECTION
Refills: 0 | Status: DISCONTINUED | OUTPATIENT
Start: 2024-12-01 | End: 2024-12-05

## 2024-12-01 RX ORDER — BACLOFEN 100 %
5 POWDER (GRAM) MISCELLANEOUS EVERY 8 HOURS
Refills: 0 | Status: DISCONTINUED | OUTPATIENT
Start: 2024-12-01 | End: 2024-12-06

## 2024-12-01 RX ORDER — LEVOTHYROXINE SODIUM 150 MCG
100 TABLET ORAL DAILY
Refills: 0 | Status: DISCONTINUED | OUTPATIENT
Start: 2024-12-01 | End: 2024-12-06

## 2024-12-01 RX ORDER — ENOXAPARIN SODIUM 30 MG/.3ML
40 INJECTION SUBCUTANEOUS EVERY 24 HOURS
Refills: 0 | Status: DISCONTINUED | OUTPATIENT
Start: 2024-12-01 | End: 2024-12-06

## 2024-12-01 RX ORDER — CEFTRIAXONE SODIUM 1 G
1000 VIAL (EA) INJECTION EVERY 24 HOURS
Refills: 0 | Status: COMPLETED | OUTPATIENT
Start: 2024-12-02 | End: 2024-12-04

## 2024-12-01 RX ORDER — INFLUENZA VIRUS VACCINE 15; 15; 15; 15 UG/.5ML; UG/.5ML; UG/.5ML; UG/.5ML
0.5 SUSPENSION INTRAMUSCULAR ONCE
Refills: 0 | Status: DISCONTINUED | OUTPATIENT
Start: 2024-12-01 | End: 2024-12-06

## 2024-12-01 RX ORDER — LORAZEPAM 2 MG/1
1 TABLET ORAL ONCE
Refills: 0 | Status: DISCONTINUED | OUTPATIENT
Start: 2024-12-01 | End: 2024-12-01

## 2024-12-01 RX ORDER — LIDOCAINE 40 MG/G
1 CREAM TOPICAL DAILY
Refills: 0 | Status: DISCONTINUED | OUTPATIENT
Start: 2024-12-01 | End: 2024-12-06

## 2024-12-01 RX ORDER — HYDROMORPHONE HYDROCHLORIDE 2 MG/1
2 TABLET ORAL EVERY 4 HOURS
Refills: 0 | Status: DISCONTINUED | OUTPATIENT
Start: 2024-12-01 | End: 2024-12-03

## 2024-12-01 RX ORDER — CEFTRIAXONE SODIUM 1 G
VIAL (EA) INJECTION
Refills: 0 | Status: COMPLETED | OUTPATIENT
Start: 2024-12-01 | End: 2024-12-05

## 2024-12-01 RX ORDER — OXYCODONE HYDROCHLORIDE 30 MG/1
5 TABLET ORAL EVERY 4 HOURS
Refills: 0 | Status: DISCONTINUED | OUTPATIENT
Start: 2024-12-01 | End: 2024-12-06

## 2024-12-01 RX ORDER — CEFTRIAXONE SODIUM 1 G
1000 VIAL (EA) INJECTION ONCE
Refills: 0 | Status: COMPLETED | OUTPATIENT
Start: 2024-12-01 | End: 2024-12-01

## 2024-12-01 RX ADMIN — Medication 100 MICROGRAM(S): at 08:24

## 2024-12-01 RX ADMIN — Medication 2 MILLIGRAM(S): at 12:13

## 2024-12-01 RX ADMIN — Medication 2 MILLIGRAM(S): at 05:50

## 2024-12-01 RX ADMIN — Medication 40 MILLIGRAM(S): at 17:25

## 2024-12-01 RX ADMIN — Medication 2 MILLIGRAM(S): at 10:55

## 2024-12-01 RX ADMIN — Medication 2 MILLIGRAM(S): at 16:45

## 2024-12-01 RX ADMIN — Medication 2 MILLIGRAM(S): at 00:24

## 2024-12-01 RX ADMIN — Medication 2 MILLIGRAM(S): at 17:45

## 2024-12-01 RX ADMIN — ENOXAPARIN SODIUM 40 MILLIGRAM(S): 30 INJECTION SUBCUTANEOUS at 08:24

## 2024-12-01 RX ADMIN — LORAZEPAM 1 MILLIGRAM(S): 2 TABLET ORAL at 11:09

## 2024-12-01 RX ADMIN — Medication 2 MILLIGRAM(S): at 00:43

## 2024-12-01 RX ADMIN — LIDOCAINE 1 PATCH: 40 CREAM TOPICAL at 19:56

## 2024-12-01 RX ADMIN — Medication 100 MILLIGRAM(S): at 17:48

## 2024-12-01 RX ADMIN — LIDOCAINE 1 PATCH: 40 CREAM TOPICAL at 12:12

## 2024-12-01 RX ADMIN — Medication 2 MILLIGRAM(S): at 01:13

## 2024-12-01 RX ADMIN — Medication 2 MILLIGRAM(S): at 04:53

## 2024-12-01 RX ADMIN — Medication 2 MILLIGRAM(S): at 13:13

## 2024-12-01 RX ADMIN — Medication 2 MILLIGRAM(S): at 08:24

## 2024-12-01 RX ADMIN — LIDOCAINE 1 PATCH: 40 CREAM TOPICAL at 07:01

## 2024-12-01 RX ADMIN — LIDOCAINE 1 PATCH: 40 CREAM TOPICAL at 07:02
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
## 2024-12-01 NOTE — H&P ADULT - ASSESSMENT
63y/o female presents to ED with Left lower back pain. Pt reports pain shoots into leg. Began Wednesday and has been worsening since. Reports significant difficulty with ambulating and pain worse with movement. Denies trauma, fever, abdominal pain, nausea, emesis, dysuria.    Back pain  - pain control  - steroids  - neuro fu   - check MRI   - will monitor     Hypothyroid  - cw synthroid    DVT prophylaxis

## 2024-12-01 NOTE — PHYSICAL THERAPY INITIAL EVALUATION ADULT - PERTINENT HX OF CURRENT PROBLEM, REHAB EVAL
65y/o female presents to ED with Left lower back pain. Pt reports pain shoots into leg. Began Wednesday and has been worsening since. Reports significant difficulty with ambulating and pain worse with movement. Denies trauma, fever, abdominal pain, nausea, emesis, dysuria.    MRI lumbar spine : L4-L5 mild disc bulge with superimposed left foraminal disc protrusion   results in mass effect upon the exiting left L4 nerve root.  Other milder degenerative changes.

## 2024-12-01 NOTE — CONSULT NOTE ADULT - ASSESSMENT
63y/o female presents to ED with Left lower back pain.   ID c/s for UTI    Acute Cystitis  Back Pain  Afebrile, Leukocytosis to 14 on admission  UA mildly positive, 15-20 WBCs  CT Renal w/ nonobstructive stone, no other acute pathology  MRI L4-L5 mild disc bulge with superimposed left foraminal disc protrusion results in mass effect upon the exiting left L4 nerve root.    Penicillin Allergy  noted--hives only. No hx of anaphylaxis    Recommendations:   Stop levofloxacin  C/w ceftriaxone  F/u urine cx  Trend temps/WBC  Pain control, supportive measures  Additional care per primary team    Infectious Diseases will follow. Please call with any questions.  Patricia Brunson M.D.  [facility] Division of Infectious Diseases [phone]  For after 5 P.M. and weekends, please call [phone]  Available on [employer] TEAMS

## 2024-12-01 NOTE — H&P ADULT - NSHPPHYSICALEXAM_GEN_ALL_CORE
General: WN/WD NAD  PERRLA  Neurology: A&Ox3, nonfocal, DIXON x 4  Respiratory: CTA B/L  CV: RRR, S1S2, no murmurs, rubs or gallops  Abdominal: Soft, NT, ND +BS, Last BM  Extremities: No edema, + peripheral pulses  Skin Normal

## 2024-12-01 NOTE — H&P ADULT - HISTORY OF PRESENT ILLNESS
63y/o female presents to ED with Left lower back pain. Pt reports pain shoots into leg. Began Wednesday and has been worsening since. Reports significant difficulty with ambulating and pain worse with movement. Denies trauma, fever, abdominal pain, nausea, emesis, dysuria.

## 2024-12-01 NOTE — CONSULT NOTE ADULT - ASSESSMENT
A/P 64y year old Female with lumbar radiculopathy, low back pain    Steroids  Muscle relaxer  Tylenol changed to ATC 1g Q8H and changed percocet PRN to oxycodone PRN for moderate pain (so we dont go over 3g/day tylenol)  Added 2mg dilaudid Q4H as patient noting minimal effectiveness of morphine IV (reduces pain from 10/10 to 8/10 only)  She has been tolerating this regimen without sedation or adverse effects thus far -- will monitor her response.  Course of outpatient PT following discharge    Primary team notes are reviewed  Laboratory studies reviewed including those mentioned earlier/above  Discussed management/coordinated care with primary team/referring provider.  High risk of morbidity from treatment with: schedule II narcotics

## 2024-12-01 NOTE — PATIENT PROFILE ADULT - INTERNATIONAL TRAVEL
Patient: Alicia Saunders    Procedure Summary     Date: 06/17/21 Room / Location: [facility] ENDOSCOPY 5 / [facility] ENDOSCOPY    Anesthesia Start: 0850 Anesthesia Stop: 0900    Procedure: ESOPHAGOGASTRODUODENOSCOPY WITH ANESTHESIA (N/A ) Diagnosis:       Other cirrhosis of liver (CMS/HCC)      (Other cirrhosis of liver (CMS/HCC) [K74.69])    Surgeons: Stiven Duval DO Provider: Harmna Lynch CRNA    Anesthesia Type: MAC ASA Status: 2          Anesthesia Type: MAC    Vitals  Vitals Value Taken Time   /94 06/17/21 0915   Temp     Pulse 71 06/17/21 0916   Resp 18 06/17/21 0915   SpO2 100 % 06/17/21 0916   Vitals shown include unvalidated device data.        Post Anesthesia Care and Evaluation    Patient location during evaluation: PHASE II  Patient participation: complete - patient participated  Level of consciousness: awake  Pain management: adequate  Airway patency: patent  Anesthetic complications: No anesthetic complications  PONV Status: none  Cardiovascular status: acceptable  Respiratory status: acceptable  Hydration status: acceptable  No anesthesia care post op     No
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
## 2024-12-01 NOTE — H&P ADULT - NSHPLABSRESULTS_GEN_ALL_CORE
Lab Results:  CBC  CBC Full  -  ( 2024 19:48 )  WBC Count : 14.75 K/uL  RBC Count : 4.25 M/uL  Hemoglobin : 13.3 g/dL  Hematocrit : 39.0 %  Platelet Count - Automated : 306 K/uL  Mean Cell Volume : 91.8 fl  Mean Cell Hemoglobin : 31.3 pg  Mean Cell Hemoglobin Concentration : 34.1 g/dL  Auto Neutrophil # : 11.22 K/uL  Auto Lymphocyte # : 2.43 K/uL  Auto Monocyte # : 0.89 K/uL  Auto Eosinophil # : 0.09 K/uL  Auto Basophil # : 0.05 K/uL  Auto Neutrophil % : 76.1 %  Auto Lymphocyte % : 16.5 %  Auto Monocyte % : 6.0 %  Auto Eosinophil % : 0.6 %  Auto Basophil % : 0.3 %    .		Differential:	[] Automated		[] Manual  Chemistry                        13.3   14.75 )-----------( 306      ( 2024 19:48 )             39.0     11-30    137  |  107  |  19  ----------------------------<  104[H]  4.4   |  27  |  0.78    Ca    10.1      2024 19:48    TPro  7.9  /  Alb  3.8  /  TBili  0.5  /  DBili  x   /  AST  15  /  ALT  23  /  AlkPhos  88  11-30    LIVER FUNCTIONS - ( 2024 19:48 )  Alb: 3.8 g/dL / Pro: 7.9 g/dL / ALK PHOS: 88 U/L / ALT: 23 U/L / AST: 15 U/L / GGT: x             Urinalysis Basic - ( 01 Dec 2024 03:25 )    Color: Yellow / Appearance: Clear / S.011 / pH: x  Gluc: x / Ketone: 15 mg/dL  / Bili: Negative / Urobili: 0.2 mg/dL   Blood: x / Protein: Negative mg/dL / Nitrite: Negative   Leuk Esterase: Moderate / RBC: 0-2 /HPF / WBC 15-20 /HPF   Sq Epi: x / Non Sq Epi: x / Bacteria: Moderate /HPF            MICROBIOLOGY/CULTURES:      RADIOLOGY RESULTS:

## 2024-12-01 NOTE — PHYSICAL THERAPY INITIAL EVALUATION ADULT - RANGE OF MOTION EXAMINATION, REHAB EVAL
B/L Lower hip and knee flexion0- 45 degree decrease AROM b/l lower secondary to lot of pain in low back/Left UE ROM was WNL (within normal limits)/Right UE ROM was WNL (within normal limits)

## 2024-12-01 NOTE — PHYSICAL THERAPY INITIAL EVALUATION ADULT - ADDITIONAL COMMENTS
patient lives in a private house with 13 steps to go to bedroom , has b/l rails and none to enter the house as per patient and her spouse. 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

## 2024-12-01 NOTE — CONSULT NOTE ADULT - ASSESSMENT
The patient is a 64 year old female with a history of hypothyroidism, enlarged aorta, lower back pain who presents with back pain.    Plan:  - ECG pending  - CT renal stone with finding of subendocardial fatty deposition in the left ventricular apex  - Unclear significance of this finding; this was not documented on prior CTs  - Check echo  - If there is concern on ECG or echo, may need outpatient cardiac MRI  - Pain control for back pain  - On IV antibiotics for UTI and kidney stone The patient is a 64 year old female with a history of hypothyroidism, enlarged aorta, lower back pain who presents with back pain.    Plan:  - ECG pending  - CT renal stone with finding of subendocardial fatty deposition in the left ventricular apex  - Unclear significance of this finding; this was not documented on prior CTs  - Check echo  - If there is concern on ECG or echo, may need outpatient cardiac MRI  - Enlarged aorta - unclear history. Will check echo to evaluate aortic root and ascending aorta. Otherwise outpatient follow-up surveillance with her doctors.  - Pain control for back pain  - On IV antibiotics for UTI and kidney stone

## 2024-12-01 NOTE — CONSULT NOTE ADULT - SUBJECTIVE AND OBJECTIVE BOX
History of Present Illness: The patient is a 64 year old female with a history of hypothyroidism, enlarged aorta, lower back pain who presents with back pain. She notes back pain with radiation to the upper left leg. No significant chest pain or shortness of breath. She states she has a history of an enlarged aorta but does not know any further details.    Past Medical/Surgical History:  hypothyroidism, enlarged aorta, lower back pain    Medications:  Home Medications:  levothyroxine 100 mcg (0.1 mg) oral tablet: 1 tab(s) orally once a day AM (01 Dec 2024 06:58)      Family History: Non-contributory family history of premature cardiovascular atherosclerotic disease    Social History: No tobacco, alcohol or drug use    Review of Systems:  General: No fevers, chills, weight gain  Skin: No rashes, color changes  Cardiovascular: No chest pain, orthopnea  Respiratory: No shortness of breath, cough  Gastrointestinal: No nausea, abdominal pain  Genitourinary: No incontinence, pain with urination  Musculoskeletal: No pain, swelling, decreased range of motion  Neurological: No headache, weakness  Psychiatric: No depression, anxiety  Endocrine: No weight gain, increased thirst  All other systems are comprehensively negative.    Physical Exam:  Vitals:        Vital Signs Last 24 Hrs  T(C): 36.4 (01 Dec 2024 08:24), Max: 36.8 (30 Nov 2024 21:06)  T(F): 97.6 (01 Dec 2024 08:24), Max: 98.3 (30 Nov 2024 21:06)  HR: 66 (01 Dec 2024 08:24) (62 - 90)  BP: 127/75 (01 Dec 2024 08:24) (96/58 - 147/64)  BP(mean): --  RR: 16 (01 Dec 2024 08:24) (16 - 20)  SpO2: 98% (01 Dec 2024 08:24) (95% - 100%)  Parameters below as of 01 Dec 2024 08:24  Patient On (Oxygen Delivery Method): room air  General: NAD  HEENT: MMM  Neck: No JVD, no carotid bruit  Lungs: CTAB  CV: RRR, nl S1/S2, no M/R/G  Abdomen: S/NT/ND, +BS  Extremities: No LE edema, no cyanosis  Neuro: AAOx3, non-focal  Skin: No rash    Labs:                        13.3   14.75 )-----------( 306      ( 30 Nov 2024 19:48 )             39.0     11-30    137  |  107  |  19  ----------------------------<  104[H]  4.4   |  27  |  0.78    Ca    10.1      30 Nov 2024 19:48    TPro  7.9  /  Alb  3.8  /  TBili  0.5  /  DBili  x   /  AST  15  /  ALT  23  /  AlkPhos  88  11-30            ECG/Telemetry: Pending

## 2024-12-01 NOTE — ED ADULT NURSE REASSESSMENT NOTE - NS ED NURSE REASSESS COMMENT FT1
patient received from night RN.  patient was still in pain on assessment.  morning medications and pain medications given with relief.  patient ate breakfast and is now asleep in stretcher.  no other complaints at this time.  plan of care continued.

## 2024-12-01 NOTE — PATIENT PROFILE ADULT - VISION (WITH CORRECTIVE LENSES IF THE PATIENT USUALLY WEARS THEM):
1 pair of eye glassess/Partially impaired: cannot see medication labels or newsprint, but can see obstacles in path, and the surrounding layout; can count fingers at arm's length

## 2024-12-02 ENCOUNTER — RESULT REVIEW (OUTPATIENT)
Age: 64
End: 2024-12-02

## 2024-12-02 RX ORDER — SENNOSIDES 8.6 MG
2 TABLET ORAL AT BEDTIME
Refills: 0 | Status: DISCONTINUED | OUTPATIENT
Start: 2024-12-02 | End: 2024-12-06

## 2024-12-02 RX ADMIN — LIDOCAINE 1 PATCH: 40 CREAM TOPICAL at 11:41

## 2024-12-02 RX ADMIN — HYDROMORPHONE HYDROCHLORIDE 2 MILLIGRAM(S): 2 TABLET ORAL at 01:41

## 2024-12-02 RX ADMIN — HYDROMORPHONE HYDROCHLORIDE 2 MILLIGRAM(S): 2 TABLET ORAL at 14:39

## 2024-12-02 RX ADMIN — ACETAMINOPHEN 500MG 1000 MILLIGRAM(S): 500 TABLET, COATED ORAL at 13:40

## 2024-12-02 RX ADMIN — Medication 40 MILLIGRAM(S): at 06:58

## 2024-12-02 RX ADMIN — ACETAMINOPHEN 500MG 1000 MILLIGRAM(S): 500 TABLET, COATED ORAL at 14:40

## 2024-12-02 RX ADMIN — Medication 40 MILLIGRAM(S): at 17:40

## 2024-12-02 RX ADMIN — Medication 100 MICROGRAM(S): at 06:58

## 2024-12-02 RX ADMIN — Medication 5 MILLIGRAM(S): at 17:43

## 2024-12-02 RX ADMIN — HYDROMORPHONE HYDROCHLORIDE 2 MILLIGRAM(S): 2 TABLET ORAL at 09:54

## 2024-12-02 RX ADMIN — HYDROMORPHONE HYDROCHLORIDE 2 MILLIGRAM(S): 2 TABLET ORAL at 10:54

## 2024-12-02 RX ADMIN — HYDROMORPHONE HYDROCHLORIDE 2 MILLIGRAM(S): 2 TABLET ORAL at 23:00

## 2024-12-02 RX ADMIN — ENOXAPARIN SODIUM 40 MILLIGRAM(S): 30 INJECTION SUBCUTANEOUS at 08:06

## 2024-12-02 RX ADMIN — HYDROMORPHONE HYDROCHLORIDE 2 MILLIGRAM(S): 2 TABLET ORAL at 17:39

## 2024-12-02 RX ADMIN — Medication 100 MILLIGRAM(S): at 17:39

## 2024-12-02 RX ADMIN — Medication 2 TABLET(S): at 06:58

## 2024-12-02 RX ADMIN — ACETAMINOPHEN 500MG 1000 MILLIGRAM(S): 500 TABLET, COATED ORAL at 22:04

## 2024-12-02 RX ADMIN — LIDOCAINE 1 PATCH: 40 CREAM TOPICAL at 00:15

## 2024-12-02 RX ADMIN — ACETAMINOPHEN 500MG 1000 MILLIGRAM(S): 500 TABLET, COATED ORAL at 06:58

## 2024-12-02 RX ADMIN — ACETAMINOPHEN 500MG 1000 MILLIGRAM(S): 500 TABLET, COATED ORAL at 22:34

## 2024-12-02 RX ADMIN — HYDROMORPHONE HYDROCHLORIDE 2 MILLIGRAM(S): 2 TABLET ORAL at 13:39

## 2024-12-02 RX ADMIN — Medication 2 MILLIGRAM(S): at 20:31

## 2024-12-02 RX ADMIN — HYDROMORPHONE HYDROCHLORIDE 2 MILLIGRAM(S): 2 TABLET ORAL at 22:05

## 2024-12-02 RX ADMIN — Medication 2 MILLIGRAM(S): at 21:01

## 2024-12-02 RX ADMIN — LIDOCAINE 1 PATCH: 40 CREAM TOPICAL at 23:41

## 2024-12-02 RX ADMIN — HYDROMORPHONE HYDROCHLORIDE 2 MILLIGRAM(S): 2 TABLET ORAL at 06:58

## 2024-12-02 NOTE — CARE COORDINATION ASSESSMENT. - NSADDITIONAL INFORMATION_FT
TRANSITION OF CARE PLAN:  Patient is self-directing own DC planning; DC to home; with out patient PT ; if she will meet criteria; patient will need a RX at DC; spouse Colton [phone] care; transport patient home at DC;  PCP: Aimee Sanches MD; Pain Management MD : Dr. Toussaint; Pharmacy: [address];

## 2024-12-02 NOTE — CARE COORDINATION ASSESSMENT. - COGNITIVE/PERCEPTUAL/DEVELOPMENTAL CONCERNS:
Gastroenterology  Opens eyes   Nonverbal    T(F): 98.3 (04-09-21 @ 11:10), Max: 101.6 (04-08-21 @ 22:20)  HR: 79 (04-09-21 @ 11:10) (79 - 112)  BP: 132/75 (04-09-21 @ 11:10) (107/61 - 132/75)  RR: 19 (04-09-21 @ 12:57) (18 - 19)  SpO2: 98% (04-09-21 @ 12:57) (93% - 98%)  Wt(kg): --  CAPILLARY BLOOD GLUCOSE      POCT Blood Glucose.: 229 mg/dL (09 Apr 2021 16:19)  POCT Blood Glucose.: 239 mg/dL (09 Apr 2021 11:10)  POCT Blood Glucose.: 249 mg/dL (09 Apr 2021 07:37)  POCT Blood Glucose.: 209 mg/dL (08 Apr 2021 21:17)      PHYSICAL EXAM:  General: NAD  Neuro:  opens eyes   HEENT: NCAT, EOMI, conjunctiva clear  CV: +S1+S2 regular rate and rhythm  Lung: course anterior breath sounds   Abdomen: soft, Non Tender, No distention Normal active BS  Extremities: no cyanosis, clubbing or edema    LABS:                        7.4    6.60  )-----------( 386      ( 07 Apr 2021 23:09 )             24.6     04-07    139  |  103  |  17  ----------------------------<  153<H>  4.7   |  31  |  0.57    Ca    8.7      07 Apr 2021 23:09  Phos  2.8     04-07  Mg     2.3     04-07          I&O's Detail    08 Apr 2021 07:01  -  09 Apr 2021 07:00  --------------------------------------------------------  IN:    Enteral Tube Flush: 300 mL    Free Water: 300 mL    Glucerna: 1320 mL  Total IN: 1920 mL    OUT:    Oral Fluid: 0 mL    Voided (mL): 2600 mL  Total OUT: 2600 mL    Total NET: -680 mL      09 Apr 2021 07:01  -  09 Apr 2021 16:56  --------------------------------------------------------  IN:  Total IN: 0 mL    OUT:    Voided (mL): 400 mL  Total OUT: 400 mL    Total NET: -400 mL        04-07 @ 23:09    139 | 103 | 17  /8.7 | 2.3 | 2.8  _______________________/  4.7 | 31 | 0.57                           \par           < from: CT Angio Chest w/ IV Cont (03.14.21 @ 18:32) >    EXAM:  CT ANGIO CHEST (W)AW IC                            PROCEDURE DATE:  03/14/2021          INTERPRETATION:  CLINICAL INFORMATION: Failure to thrive. Electrolyte imbalance. History of COVID. Evaluate for pulmonary embolism.    COMPARISON: Chest radiograph 3/14/2021.    CONTRAST/COMPLICATIONS:  IV Contrast: Omnipaque 350 / 85 cc administered / .  Oral Contrast: NONE  Complications: None reported at time of study completion    PROCEDURE:  CT Angiography of the Chest.  Sagittal and coronal reformats were performed as well as 3D (MIP) reconstructions.    FINDINGS:    LUNGS AND AIRWAYS PLEURA:  Small bilateral pleural effusions and underlying compressive atelectasis, larger on the right.  Patchy bilateral groundglass opacities and mosaic attenuation.  Left apical pleural parenchymal consolidation with focal cystic bronchiectasis.  Paraseptal emphysematous changes right middle lobe.    The central airways remain patent.    MEDIASTINUM AND HERNAN: No lymphadenopathy.    VESSELS: There are pulmonary emboli throughout the right lower lobe and segmental right lower lobe pulmonary arteries.  There are pulmonary emboli segmental left lingula lobe and left lower lobe and segmental left lower lobe pulmonary arterial vasculature.  There is prominence of the main pulmonary arterial trunk measuring 3.3 cm, finding which may be associated with pulmonary arterial hypertension.    Atherosclerotic calcification of the thoracic aorta with coronary artery calcifications.    HEART: Mildly enlarged.  Trace pericardial effusion.    CHEST WALL AND LOWER NECK: Within normal limits.    VISUALIZED UPPER ABDOMEN:  Streak artifact degrades image quality limiting evaluation.  Nodular contour of liver.  Nodular thickening bilateral adrenal glands.    BONES:Degenerative changes spine.    IMPRESSION:    Acute bilateral pulmonary emboli as discussed.  Prominence of the main pulmonary arterial trunk which may reflect pulmonary arterial hypertension.  This critical value was discussed with Dr. Mtz   by telephone at the time of interpretation on 3/14/2021.    Other findings as discussed above.      PETRONA MCCONNELL MD; Attending Radiologist  This document has been electronically signed. Mar 14 2021  7:22PM    < end of copied text >   no concerns

## 2024-12-02 NOTE — CARE COORDINATION ASSESSMENT. - OTHER PERTINENT REFERRAL INFORMATION
RN CCM met with patient for assessment; transition of care planning at  bedside; patient A & Ox 4; explained RN CCM role in safe DC planning; verbalized understanding; Lives with spouse Colton in private home; Patient reports she is independent in ambulation, stairs, ADLs and iADLs, she still works and has chronic back pain that flares up once in a while; patient has RWs at home if needed; Patient states; she DOES NOT want to go to Rehab; intends to go home; open to out-patient PT if she will meet criteria;

## 2024-12-02 NOTE — CARE COORDINATION ASSESSMENT. - NSCAREPROVIDERS_GEN_ALL_CORE_FT
CARE PROVIDERS:  Accepting Physician: Harriet Fournier  Access Services: Terry Laird  Administration: Maxim Peter  Administration: Bart Mcnair  Admitting: Harriet Fournier  Attending: Harriet Fournier  Cardiology Technician: Gisselle Ross  Case Management: Santaromana, Anna  Consultant: Patricia Brunson  Consultant: Best Joseph  Consultant: French Gauthier  Consultant: Gaetano Marquez  Consultant: Chetan Aguirre  Covering Team: Nnamdi Brunson  ED ACP: Maxim Patrick  ED Attending: Padmini Pradhan  ED Nurse: Vivi Kramer  HIM/Billing & Coding: Cherise Proctor  Intern: Ros Monae  Nurse: Natty Hernandez  Nurse: Amirah Holt  Nurse: Yolanda Valle  Nurse: Tracie Blake  Ordered: Doctor, Unknown  Outpatient Provider: John Aguilar  Outpatient Provider: French Gauthier  Outpatient Provider: Best Joseph  Override: Avel Hernandez  Override: Shanel Ren  PCA/Nursing Assistant: Avel Hernandez  Physical Therapy: Brien Barlow  Primary Team: Christianne Willard  Primary Team: Jimmie Vela  Primary Team: Latasha Salcido  Registered Dietitian: Pamela Estrada  : Vicky Tellez// Supp. Assoc.: Radha Fatima

## 2024-12-02 NOTE — PROGRESS NOTE ADULT - SUBJECTIVE AND OBJECTIVE BOX
Chief Complaint: Back pain    Interval Events: No events overnight.    Review of Systems:  General: No fevers, chills, weight gain  Skin: No rashes, color changes  Cardiovascular: No chest pain, orthopnea  Respiratory: No shortness of breath, cough  Gastrointestinal: No nausea, abdominal pain  Genitourinary: No incontinence, pain with urination  Musculoskeletal: No pain, swelling, decreased range of motion  Neurological: No headache, weakness  Psychiatric: No depression, anxiety  Endocrine: No weight gain, increased thirst  All other systems are comprehensively negative.    Physical Exam:  Vitals:        Vital Signs Last 24 Hrs  T(C): 36.4 (02 Dec 2024 05:44), Max: 36.7 (01 Dec 2024 16:50)  T(F): 97.6 (02 Dec 2024 05:44), Max: 98 (01 Dec 2024 16:50)  HR: 60 (02 Dec 2024 05:44) (60 - 78)  BP: 170/81 (02 Dec 2024 05:44) (105/70 - 170/81)  BP(mean): --  RR: 17 (02 Dec 2024 05:44) (16 - 17)  SpO2: 97% (02 Dec 2024 05:44) (94% - 99%)  Parameters below as of 02 Dec 2024 05:44  Patient On (Oxygen Delivery Method): room air  General: NAD  HEENT: MMM  Neck: No JVD, no carotid bruit  Lungs: CTAB  CV: RRR, nl S1/S2, no M/R/G  Abdomen: S/NT/ND, +BS  Extremities: No LE edema, no cyanosis  Neuro: AAOx3, non-focal  Skin: No rash    Labs:                        13.3   14.75 )-----------( 306      ( 30 Nov 2024 19:48 )             39.0     11-30    137  |  107  |  19  ----------------------------<  104[H]  4.4   |  27  |  0.78    Ca    10.1      30 Nov 2024 19:48    TPro  7.9  /  Alb  3.8  /  TBili  0.5  /  DBili  x   /  AST  15  /  ALT  23  /  AlkPhos  88  11-30

## 2024-12-02 NOTE — PATIENT CHOICE NOTE. - NSPTCHOICENOTES_GEN_ALL_CORE
TRANSITION OF CARE PLAN:  Patient is self-directing own DC planning; DC to home; with out patient PT ; if she will meet criteria; patient will need a RX at DC; spouse Colton [phone] care; transport patient home at DC;  PCP: Aimee Sanches MD; Pain Management MD : Dr. Toussaint; Pharmacy: [address];

## 2024-12-02 NOTE — PROGRESS NOTE ADULT - SUBJECTIVE AND OBJECTIVE BOX
Patient is a 64y old  Female who presents with a chief complaint of back pain (02 Dec 2024 14:53)    Date of servie : 24 @ 15:14  INTERVAL HPI/OVERNIGHT EVENTS:  T(C): 36.7 (24 @ 12:02), Max: 36.7 (24 @ 16:50)  HR: 67 (24 @ 12:02) (60 - 75)  BP: 110/70 (24 @ 12:02) (110/70 - 170/81)  RR: 17 (24 @ 12:02) (17 - 17)  SpO2: 93% (24 @ 12:02) (93% - 97%)  Wt(kg): --  I&O's Summary      LABS:                        13.3   14.75 )-----------( 306      ( 2024 19:48 )             39.0     11-30    137  |  107  |  19  ----------------------------<  104[H]  4.4   |  27  |  0.78    Ca    10.1      2024 19:48    TPro  7.9  /  Alb  3.8  /  TBili  0.5  /  DBili  x   /  AST  15  /  ALT  23  /  AlkPhos  88  11-30      Urinalysis Basic - ( 01 Dec 2024 03:25 )    Color: Yellow / Appearance: Clear / S.011 / pH: x  Gluc: x / Ketone: 15 mg/dL  / Bili: Negative / Urobili: 0.2 mg/dL   Blood: x / Protein: Negative mg/dL / Nitrite: Negative   Leuk Esterase: Moderate / RBC: 0-2 /HPF / WBC 15-20 /HPF   Sq Epi: x / Non Sq Epi: x / Bacteria: Moderate /HPF      CAPILLARY BLOOD GLUCOSE            Urinalysis Basic - ( 01 Dec 2024 03:25 )    Color: Yellow / Appearance: Clear / S.011 / pH: x  Gluc: x / Ketone: 15 mg/dL  / Bili: Negative / Urobili: 0.2 mg/dL   Blood: x / Protein: Negative mg/dL / Nitrite: Negative   Leuk Esterase: Moderate / RBC: 0-2 /HPF / WBC 15-20 /HPF   Sq Epi: x / Non Sq Epi: x / Bacteria: Moderate /HPF        MEDICATIONS  (STANDING):  acetaminophen     Tablet .. 1000 milliGRAM(s) Oral every 8 hours  cefTRIAXone   IVPB 1000 milliGRAM(s) IV Intermittent every 24 hours  cefTRIAXone   IVPB      enoxaparin Injectable 40 milliGRAM(s) SubCutaneous every 24 hours  HYDROmorphone   Tablet 2 milliGRAM(s) Oral every 4 hours  influenza   Vaccine 0.5 milliLiter(s) IntraMuscular once  levothyroxine 100 MICROGram(s) Oral daily  lidocaine   4% Patch 1 Patch Transdermal daily  methylPREDNISolone sodium succinate Injectable 40 milliGRAM(s) IV Push two times a day    MEDICATIONS  (PRN):  baclofen 5 milliGRAM(s) Oral every 8 hours PRN Muscle Spasm  lactulose Syrup 10 Gram(s) Oral every 24 hours PRN constipation  morphine  - Injectable 2 milliGRAM(s) IV Push every 4 hours PRN Severe Pain (7 - 10)  oxyCODONE    IR 5 milliGRAM(s) Oral every 4 hours PRN Moderate Pain (4 - 6)  senna 2 Tablet(s) Oral at bedtime PRN Constipation          PHYSICAL EXAM:  GENERAL: NAD, well-groomed, well-developed  HEAD:  Atraumatic, Normocephalic  CHEST/LUNG: Clear to percussion bilaterally; No rales, rhonchi, wheezing, or rubs  HEART: Regular rate and rhythm; No murmurs, rubs, or gallops  ABDOMEN: Soft, Nontender, Nondistended; Bowel sounds present  EXTREMITIES:  2+ Peripheral Pulses, No clubbing, cyanosis, or edema  LYMPH: No lymphadenopathy noted  SKIN: No rashes or lesions    Care Discussed with Consultants/Other Providers [ ] YES  [ ] NO

## 2024-12-02 NOTE — PROGRESS NOTE ADULT - SUBJECTIVE AND OBJECTIVE BOX
[facility], Division of Infectious Diseases  MAEVE Garcia Y. Patel, S. Shah, G. [facility]  [phone]    Name: NAIDA FAM  Age: 64y  Gender: Female  MRN: [id number]    Interval History:  Patient seen and examined at bedside  Still having back pain  No complaints  Notes reviewed    Antibiotics:  cefTRIAXone   IVPB 1000 milliGRAM(s) IV Intermittent every 24 hours  cefTRIAXone   IVPB          Medications:  acetaminophen     Tablet .. 1000 milliGRAM(s) Oral every 8 hours  baclofen 5 milliGRAM(s) Oral every 8 hours PRN  cefTRIAXone   IVPB 1000 milliGRAM(s) IV Intermittent every 24 hours  cefTRIAXone   IVPB      enoxaparin Injectable 40 milliGRAM(s) SubCutaneous every 24 hours  HYDROmorphone   Tablet 2 milliGRAM(s) Oral every 4 hours  influenza   Vaccine 0.5 milliLiter(s) IntraMuscular once  lactulose Syrup 10 Gram(s) Oral every 24 hours PRN  levothyroxine 100 MICROGram(s) Oral daily  lidocaine   4% Patch 1 Patch Transdermal daily  methylPREDNISolone sodium succinate Injectable 40 milliGRAM(s) IV Push two times a day  morphine  - Injectable 2 milliGRAM(s) IV Push every 4 hours PRN  oxyCODONE    IR 5 milliGRAM(s) Oral every 4 hours PRN  senna 2 Tablet(s) Oral at bedtime PRN      Review of Systems:  A 10-point review of systems was obtained.   Review of systems otherwise negative except as previously noted.    Allergies: penicillin (Hives)    For details regarding the patient's past medical history, social history, family history, and other miscellaneous elements, please refer the initial infectious diseases consultation and/or the admitting history and physical examination for this admission.    Objective:  Vitals:   T(C): 36.7 (24 @ 12:02), Max: 36.7 (24 @ 16:50)  HR: 67 (24 @ 12:02) (60 - 78)  BP: 110/70 (24 @ 12:02) (110/65 - 170/81)  RR: 17 (24 @ 12:02) (17 - 17)  SpO2: 93% (24 @ 12:02) (93% - 99%)    Physical Examination:  General: no acute distress  HEENT: NC/AT, EOMI,  Cardio: RRR  Resp: CTA  Abd: soft, NT, ND  Ext: no edema or cyanosis  Skin: warm, dry, no visible rash      Laboratory Studies:  CBC:                       13.3   14.75 )-----------( 306      ( 2024 19:48 )             39.0     CMP:     137  |  107  |  19  ----------------------------<  104[H]  4.4   |  27  |  0.78    Ca    10.1      2024 19:48    TPro  7.9  /  Alb  3.8  /  TBili  0.5  /  DBili  x   /  AST  15  /  ALT  23  /  AlkPhos  88      LIVER FUNCTIONS - ( 2024 19:48 )  Alb: 3.8 g/dL / Pro: 7.9 g/dL / ALK PHOS: 88 U/L / ALT: 23 U/L / AST: 15 U/L / GGT: x           Urinalysis Basic - ( 01 Dec 2024 03:25 )    Color: Yellow / Appearance: Clear / S.011 / pH: x  Gluc: x / Ketone: 15 mg/dL  / Bili: Negative / Urobili: 0.2 mg/dL   Blood: x / Protein: Negative mg/dL / Nitrite: Negative   Leuk Esterase: Moderate / RBC: 0-2 /HPF / WBC 15-20 /HPF   Sq Epi: x / Non Sq Epi: x / Bacteria: Moderate /HPF        Microbiology: reviewed    Culture - Urine (collected 24 @ 03:25)  Source: Clean Catch  Final Report (24 @ 11:56):    <10,000 CFU/mL Normal Urogenital Monique    Urinalysis with Rflx Culture (collected 24 @ 03:25)          Radiology: reviewed

## 2024-12-02 NOTE — CAREGIVER ENGAGEMENT NOTE - CAREGIVER OUTREACH NOTES - FREE TEXT
TRANSITION OF CARE PLAN:  Patient is self-directing own DC planning; DC to home; with out patient PT ; if she will meet criteria; patient will need a RX at DC; spouse Colton [phone] care; transport patient home at DC;  PCP: Aimee Sanches MD; Pain Management MD : Dr. Toussaint; Pharmacy: [address];

## 2024-12-02 NOTE — PROGRESS NOTE ADULT - SUBJECTIVE AND OBJECTIVE BOX
Neurology follow up note    NAIDA [id number]      Interval History:    Patient feels ok no new complaints.    Allergies    penicillin (Hives)    Intolerances        MEDICATIONS    acetaminophen     Tablet .. 1000 milliGRAM(s) Oral every 8 hours  baclofen 5 milliGRAM(s) Oral every 8 hours PRN  cefTRIAXone   IVPB      cefTRIAXone   IVPB 1000 milliGRAM(s) IV Intermittent every 24 hours  enoxaparin Injectable 40 milliGRAM(s) SubCutaneous every 24 hours  HYDROmorphone   Tablet 2 milliGRAM(s) Oral every 4 hours  influenza   Vaccine 0.5 milliLiter(s) IntraMuscular once  lactulose Syrup 10 Gram(s) Oral every 24 hours PRN  levothyroxine 100 MICROGram(s) Oral daily  lidocaine   4% Patch 1 Patch Transdermal daily  methylPREDNISolone sodium succinate Injectable 40 milliGRAM(s) IV Push two times a day  morphine  - Injectable 2 milliGRAM(s) IV Push every 4 hours PRN  oxyCODONE    IR 5 milliGRAM(s) Oral every 4 hours PRN  senna 2 Tablet(s) Oral at bedtime PRN              Vital Signs Last 24 Hrs  T(C): 36.4 (02 Dec 2024 05:44), Max: 36.7 (01 Dec 2024 16:50)  T(F): 97.6 (02 Dec 2024 05:44), Max: 98 (01 Dec 2024 16:50)  HR: 60 (02 Dec 2024 05:44) (60 - 78)  BP: 170/81 (02 Dec 2024 05:44) (105/70 - 170/81)  BP(mean): --  RR: 17 (02 Dec 2024 05:44) (16 - 17)  SpO2: 97% (02 Dec 2024 05:44) (94% - 99%)    Parameters below as of 02 Dec 2024 05:44  Patient On (Oxygen Delivery Method): room air      REVIEW OF SYSTEMS:  CONSTITUTIONAL:  The patient denies fever, chills, or night sweats.[id number]  HEAD:  No headache.  EYES:  No double vision or blurry vision.  EARS:  No ringing in ears.  NECK:  No neck pain.  CARDIOVASCULAR:  No chest pain.  RESPIRATORY:  No shortness of breath.  ABDOMEN:  No nausea, vomiting, or abdominal pain.  EXTREMITIES/NEUROLOGICAL:  No numbness or tingling.  GENERAL:  Positive back pain that runs down to her left buttocks.  No problems passing urine or stools.    PHYSICAL EXAMINATION:  HEAD:  Normocephalic, atraumatic.  EYES:  No scleral icterus.  EARS:  Hearing bilaterally intact.  NECK:  Supple.  CARDIOVASCULAR:  S1 and S2 heard .  RESPIRATORY:  Air entry bilaterally.  ABDOMEN:  Soft, nontender.  EXTREMITIES:  No clubbing or cyanosis was noted.    NEUROLOGIC:  The patient is awake, alert, and oriented x3.  Extraocular movements are intact.  Speech was fluent.  Smile symmetric.  Motor, bilateral upper 5/5, bilateral lower 4+ out of 5 proximally secondary to pain.  Sensory, bilaterally intact to light touch.       LABS:  CBC Full  -  ( 2024 19:48 )  WBC Count : 14.75 K/uL  RBC Count : 4.25 M/uL  Hemoglobin : 13.3 g/dL  Hematocrit : 39.0 %  Platelet Count - Automated : 306 K/uL  Mean Cell Volume : 91.8 fl  Mean Cell Hemoglobin : 31.3 pg  Mean Cell Hemoglobin Concentration : 34.1 g/dL  Auto Neutrophil # : 11.22 K/uL  Auto Lymphocyte # : 2.43 K/uL  Auto Monocyte # : 0.89 K/uL  Auto Eosinophil # : 0.09 K/uL  Auto Basophil # : 0.05 K/uL  Auto Neutrophil % : 76.1 %  Auto Lymphocyte % : 16.5 %  Auto Monocyte % : 6.0 %  Auto Eosinophil % : 0.6 %  Auto Basophil % : 0.3 %    Urinalysis Basic - ( 01 Dec 2024 03:25 )    Color: Yellow / Appearance: Clear / S.011 / pH: x  Gluc: x / Ketone: 15 mg/dL  / Bili: Negative / Urobili: 0.2 mg/dL   Blood: x / Protein: Negative mg/dL / Nitrite: Negative   Leuk Esterase: Moderate / RBC: 0-2 /HPF / WBC 15-20 /HPF   Sq Epi: x / Non Sq Epi: x / Bacteria: Moderate /HPF      11-    137  |  107  |  19  ----------------------------<  104[H]  4.4   |  27  |  0.78    Ca    10.1      2024 19:48    TPro  7.9  /  Alb  3.8  /  TBili  0.5  /  DBili  x   /  AST  15  /  ALT  23  /  AlkPhos  88  11-30    Hemoglobin A1C:     LIVER FUNCTIONS - ( 2024 19:48 )  Alb: 3.8 g/dL / Pro: 7.9 g/dL / ALK PHOS: 88 U/L / ALT: 23 U/L / AST: 15 U/L / GGT: x           Vitamin B12         RADIOLOGY  IMPRESSION:  L4-L5 mild disc bulge with superimposed left foraminal disc protrusion   results in mass effect upon the exiting left L4 nerve root.    Other milder degenerative changes.  ANALYSIS AND PLAN:  This is a 64-year-old with lower back pain.    1.For lower back pain, questionable could this be secondary to lumbar radiculopathy being exacerbated versus possibly a type of renal stone.  2.MRI imaging of lumbar spine ortho follow up as needed.  3.We will recommend pain management appreciated   4.For history of hypothyroidism, continue Synthroid.  5.We will try Lidoderm patch.  7.52 minutes of time spent with the patient, plan of care, reviewing data, speaking to multidisciplinary health care team with greater than 50% of time counseling and care coordination.  8.Thank you for courtesy of consultation.  PATIENT HAS NOT YET BEEN SEEN AND EXAMINED TODAY. NOTE AND CHART REVIEWED IN AM AND EXAM FORM PREVIOUS.  ONCE PATIENT SEEN, CHART WILL BE UPDATE AT PRESENT NOTE IS INCOMPLETE     Neurology follow up note    NAIDA [id number]      Interval History:    Patient feels lbp    Allergies    penicillin (Hives)    Intolerances        MEDICATIONS    acetaminophen     Tablet .. 1000 milliGRAM(s) Oral every 8 hours  baclofen 5 milliGRAM(s) Oral every 8 hours PRN  cefTRIAXone   IVPB      cefTRIAXone   IVPB 1000 milliGRAM(s) IV Intermittent every 24 hours  enoxaparin Injectable 40 milliGRAM(s) SubCutaneous every 24 hours  HYDROmorphone   Tablet 2 milliGRAM(s) Oral every 4 hours  influenza   Vaccine 0.5 milliLiter(s) IntraMuscular once  lactulose Syrup 10 Gram(s) Oral every 24 hours PRN  levothyroxine 100 MICROGram(s) Oral daily  lidocaine   4% Patch 1 Patch Transdermal daily  methylPREDNISolone sodium succinate Injectable 40 milliGRAM(s) IV Push two times a day  morphine  - Injectable 2 milliGRAM(s) IV Push every 4 hours PRN  oxyCODONE    IR 5 milliGRAM(s) Oral every 4 hours PRN  senna 2 Tablet(s) Oral at bedtime PRN              Vital Signs Last 24 Hrs  T(C): 36.4 (02 Dec 2024 05:44), Max: 36.7 (01 Dec 2024 16:50)  T(F): 97.6 (02 Dec 2024 05:44), Max: 98 (01 Dec 2024 16:50)  HR: 60 (02 Dec 2024 05:44) (60 - 78)  BP: 170/81 (02 Dec 2024 05:44) (105/70 - 170/81)  BP(mean): --  RR: 17 (02 Dec 2024 05:44) (16 - 17)  SpO2: 97% (02 Dec 2024 05:44) (94% - 99%)    Parameters below as of 02 Dec 2024 05:44  Patient On (Oxygen Delivery Method): room air      REVIEW OF SYSTEMS:  CONSTITUTIONAL:  The patient denies fever, chills, or night sweats.[id number]  HEAD:  No headache.  EYES:  No double vision or blurry vision.  EARS:  No ringing in ears.  NECK:  No neck pain.  CARDIOVASCULAR:  No chest pain.  RESPIRATORY:  No shortness of breath.  ABDOMEN:  No nausea, vomiting, or abdominal pain.  EXTREMITIES/NEUROLOGICAL:  No numbness or tingling.  GENERAL:  Positive back pain that runs down to her left buttocks.  No problems passing urine or stools.    PHYSICAL EXAMINATION:  HEAD:  Normocephalic, atraumatic.  EYES:  No scleral icterus.  EARS:  Hearing bilaterally intact.  NECK:  Supple.  CARDIOVASCULAR:  S1 and S2 heard .  RESPIRATORY:  Air entry bilaterally.  ABDOMEN:  Soft, nontender.  EXTREMITIES:  No clubbing or cyanosis was noted.    NEUROLOGIC:  The patient is awake, alert, and oriented x3.  Extraocular movements are intact.  Speech was fluent.  Smile symmetric.  Motor, bilateral upper 5/5, bilateral lower 4+ out of 5 proximally secondary to pain.  Sensory, bilaterally intact to light touch.       LABS:  CBC Full  -  ( 2024 19:48 )  WBC Count : 14.75 K/uL  RBC Count : 4.25 M/uL  Hemoglobin : 13.3 g/dL  Hematocrit : 39.0 %  Platelet Count - Automated : 306 K/uL  Mean Cell Volume : 91.8 fl  Mean Cell Hemoglobin : 31.3 pg  Mean Cell Hemoglobin Concentration : 34.1 g/dL  Auto Neutrophil # : 11.22 K/uL  Auto Lymphocyte # : 2.43 K/uL  Auto Monocyte # : 0.89 K/uL  Auto Eosinophil # : 0.09 K/uL  Auto Basophil # : 0.05 K/uL  Auto Neutrophil % : 76.1 %  Auto Lymphocyte % : 16.5 %  Auto Monocyte % : 6.0 %  Auto Eosinophil % : 0.6 %  Auto Basophil % : 0.3 %    Urinalysis Basic - ( 01 Dec 2024 03:25 )    Color: Yellow / Appearance: Clear / S.011 / pH: x  Gluc: x / Ketone: 15 mg/dL  / Bili: Negative / Urobili: 0.2 mg/dL   Blood: x / Protein: Negative mg/dL / Nitrite: Negative   Leuk Esterase: Moderate / RBC: 0-2 /HPF / WBC 15-20 /HPF   Sq Epi: x / Non Sq Epi: x / Bacteria: Moderate /HPF      11-30    137  |  107  |  19  ----------------------------<  104[H]  4.4   |  27  |  0.78    Ca    10.1      2024 19:48    TPro  7.9  /  Alb  3.8  /  TBili  0.5  /  DBili  x   /  AST  15  /  ALT  23  /  AlkPhos  88  11-30    Hemoglobin A1C:     LIVER FUNCTIONS - ( 2024 19:48 )  Alb: 3.8 g/dL / Pro: 7.9 g/dL / ALK PHOS: 88 U/L / ALT: 23 U/L / AST: 15 U/L / GGT: x           Vitamin B12         RADIOLOGY  IMPRESSION:  L4-L5 mild disc bulge with superimposed left foraminal disc protrusion   results in mass effect upon the exiting left L4 nerve root.    Other milder degenerative changes.  ANALYSIS AND PLAN:  This is a 64-year-old with lower back pain.    1.For lower back pain, questionable could this be secondary to lumbar radiculopathy being exacerbated versus possibly a type of renal stone.  2.MRI imaging of lumbar spine ortho follow up as needed.  3.We will recommend pain management appreciated   4.For history of hypothyroidism, continue Synthroid.  5.We will try Lidoderm patch.  7.52 minutes of time spent with the patient, plan of care, reviewing data, speaking to multidisciplinary health care team with greater than 50% of time counseling and care coordination.  8.Thank you for courtesy of consultation.  PATIENT HAS NOT YET BEEN SEEN AND EXAMINED TODA

## 2024-12-03 RX ORDER — POLYETHYLENE GLYCOL 3350 17 G/17G
17 POWDER, FOR SOLUTION ORAL
Refills: 0 | Status: DISCONTINUED | OUTPATIENT
Start: 2024-12-03 | End: 2024-12-06

## 2024-12-03 RX ORDER — HYDROMORPHONE HYDROCHLORIDE 2 MG/1
4 TABLET ORAL EVERY 4 HOURS
Refills: 0 | Status: DISCONTINUED | OUTPATIENT
Start: 2024-12-03 | End: 2024-12-06

## 2024-12-03 RX ADMIN — HYDROMORPHONE HYDROCHLORIDE 4 MILLIGRAM(S): 2 TABLET ORAL at 15:08

## 2024-12-03 RX ADMIN — HYDROMORPHONE HYDROCHLORIDE 2 MILLIGRAM(S): 2 TABLET ORAL at 11:21

## 2024-12-03 RX ADMIN — Medication 40 MILLIGRAM(S): at 18:04

## 2024-12-03 RX ADMIN — POLYETHYLENE GLYCOL 3350 17 GRAM(S): 17 POWDER, FOR SOLUTION ORAL at 18:05

## 2024-12-03 RX ADMIN — HYDROMORPHONE HYDROCHLORIDE 4 MILLIGRAM(S): 2 TABLET ORAL at 18:04

## 2024-12-03 RX ADMIN — ACETAMINOPHEN 500MG 1000 MILLIGRAM(S): 500 TABLET, COATED ORAL at 14:08

## 2024-12-03 RX ADMIN — Medication 100 MILLIGRAM(S): at 18:04

## 2024-12-03 RX ADMIN — Medication 2 MILLIGRAM(S): at 09:46

## 2024-12-03 RX ADMIN — ACETAMINOPHEN 500MG 1000 MILLIGRAM(S): 500 TABLET, COATED ORAL at 22:10

## 2024-12-03 RX ADMIN — HYDROMORPHONE HYDROCHLORIDE 2 MILLIGRAM(S): 2 TABLET ORAL at 07:19

## 2024-12-03 RX ADMIN — HYDROMORPHONE HYDROCHLORIDE 4 MILLIGRAM(S): 2 TABLET ORAL at 21:17

## 2024-12-03 RX ADMIN — POLYETHYLENE GLYCOL 3350 17 GRAM(S): 17 POWDER, FOR SOLUTION ORAL at 12:16

## 2024-12-03 RX ADMIN — ACETAMINOPHEN 500MG 1000 MILLIGRAM(S): 500 TABLET, COATED ORAL at 07:19

## 2024-12-03 RX ADMIN — Medication 40 MILLIGRAM(S): at 06:03

## 2024-12-03 RX ADMIN — LIDOCAINE 1 PATCH: 40 CREAM TOPICAL at 21:34

## 2024-12-03 RX ADMIN — HYDROMORPHONE HYDROCHLORIDE 4 MILLIGRAM(S): 2 TABLET ORAL at 22:10

## 2024-12-03 RX ADMIN — ACETAMINOPHEN 500MG 1000 MILLIGRAM(S): 500 TABLET, COATED ORAL at 15:08

## 2024-12-03 RX ADMIN — HYDROMORPHONE HYDROCHLORIDE 2 MILLIGRAM(S): 2 TABLET ORAL at 06:03

## 2024-12-03 RX ADMIN — Medication 2 MILLIGRAM(S): at 10:46

## 2024-12-03 RX ADMIN — Medication 100 MICROGRAM(S): at 06:03

## 2024-12-03 RX ADMIN — HYDROMORPHONE HYDROCHLORIDE 4 MILLIGRAM(S): 2 TABLET ORAL at 14:08

## 2024-12-03 RX ADMIN — ENOXAPARIN SODIUM 40 MILLIGRAM(S): 30 INJECTION SUBCUTANEOUS at 08:18

## 2024-12-03 RX ADMIN — ACETAMINOPHEN 500MG 1000 MILLIGRAM(S): 500 TABLET, COATED ORAL at 06:03

## 2024-12-03 RX ADMIN — OXYCODONE HYDROCHLORIDE 5 MILLIGRAM(S): 30 TABLET ORAL at 03:00

## 2024-12-03 RX ADMIN — Medication 5 MILLIGRAM(S): at 09:46

## 2024-12-03 RX ADMIN — OXYCODONE HYDROCHLORIDE 5 MILLIGRAM(S): 30 TABLET ORAL at 02:24

## 2024-12-03 RX ADMIN — HYDROMORPHONE HYDROCHLORIDE 2 MILLIGRAM(S): 2 TABLET ORAL at 10:21

## 2024-12-03 RX ADMIN — LIDOCAINE 1 PATCH: 40 CREAM TOPICAL at 12:16

## 2024-12-03 RX ADMIN — ACETAMINOPHEN 500MG 1000 MILLIGRAM(S): 500 TABLET, COATED ORAL at 21:17

## 2024-12-03 NOTE — CHART NOTE - NSCHARTNOTEFT_GEN_A_CORE
65y/o female presents to ED with Left lower back pain. Pt reports pain shoots into leg. Began Wednesday and has been worsening since. Reports significant difficulty with ambulating and pain worse with movement. Ortho following, recommendations noted. Follow up with original spine surgeon or Dr. Felix 1 week after discharge  - D/W Dr Felix who agrees with above plan 65y/o female presents to ED with Left lower back pain. Pt reports pain shoots into leg. Began Wednesday and has been worsening since. Reports significant difficulty with ambulating and pain worse with movement. Ortho following, recommendations noted. Follow up with original spine surgeon or Dr. Felix 1 week after discharge.     Patient seen at bedside, c/o intractable pain currently not relieved with current management. I have called Dr. Marquez who at this time recommends increasing to Dilaudid 4 mg po Q4 Hrs standing, in addition to oxycodone for moderate and morphine IV PRN for severe. Plan discussed with Dr. Fournier.

## 2024-12-03 NOTE — PROGRESS NOTE ADULT - SUBJECTIVE AND OBJECTIVE BOX
Chief Complaint: Back pain    Interval Events: No events overnight.    Review of Systems:  General: No fevers, chills, weight gain  Skin: No rashes, color changes  Cardiovascular: No chest pain, orthopnea  Respiratory: No shortness of breath, cough  Gastrointestinal: No nausea, abdominal pain  Genitourinary: No incontinence, pain with urination  Musculoskeletal: No pain, swelling, decreased range of motion  Neurological: No headache, weakness  Psychiatric: No depression, anxiety  Endocrine: No weight gain, increased thirst  All other systems are comprehensively negative.    Physical Exam:  Vital Signs Last 24 Hrs  T(C): 36.7 (03 Dec 2024 05:07), Max: 36.7 (02 Dec 2024 12:02)  T(F): 98 (03 Dec 2024 05:07), Max: 98.1 (02 Dec 2024 12:02)  HR: 62 (03 Dec 2024 05:07) (62 - 73)  BP: 120/62 (03 Dec 2024 05:07) (110/70 - 139/67)  BP(mean): --  RR: 18 (03 Dec 2024 05:07) (17 - 18)  SpO2: 98% (03 Dec 2024 05:07) (93% - 98%)  Parameters below as of 03 Dec 2024 05:07  Patient On (Oxygen Delivery Method): room air  General: NAD  HEENT: MMM  Neck: No JVD, no carotid bruit  Lungs: CTAB  CV: RRR, nl S1/S2, no M/R/G  Abdomen: S/NT/ND, +BS  Extremities: No LE edema, no cyanosis  Neuro: AAOx3, non-focal  Skin: No rash    Labs:

## 2024-12-03 NOTE — PROGRESS NOTE ADULT - SUBJECTIVE AND OBJECTIVE BOX
Neurology follow up note    NAIDA [id number]      Interval History:    Patient feels LBP to left leg and left leg is giving out     Allergies    penicillin (Hives)    Intolerances        MEDICATIONS    acetaminophen     Tablet .. 1000 milliGRAM(s) Oral every 8 hours  baclofen 5 milliGRAM(s) Oral every 8 hours PRN  cefTRIAXone   IVPB      cefTRIAXone   IVPB 1000 milliGRAM(s) IV Intermittent every 24 hours  enoxaparin Injectable 40 milliGRAM(s) SubCutaneous every 24 hours  HYDROmorphone   Tablet 2 milliGRAM(s) Oral every 4 hours  influenza   Vaccine 0.5 milliLiter(s) IntraMuscular once  lactulose Syrup 10 Gram(s) Oral every 24 hours PRN  levothyroxine 100 MICROGram(s) Oral daily  lidocaine   4% Patch 1 Patch Transdermal daily  methylPREDNISolone sodium succinate Injectable 40 milliGRAM(s) IV Push two times a day  morphine  - Injectable 2 milliGRAM(s) IV Push every 4 hours PRN  oxyCODONE    IR 5 milliGRAM(s) Oral every 4 hours PRN  senna 2 Tablet(s) Oral at bedtime PRN              Vital Signs Last 24 Hrs  T(C): 36.7 (03 Dec 2024 05:07), Max: 36.7 (02 Dec 2024 12:02)  T(F): 98 (03 Dec 2024 05:07), Max: 98.1 (02 Dec 2024 12:02)  HR: 62 (03 Dec 2024 05:07) (62 - 73)  BP: 120/62 (03 Dec 2024 05:07) (110/70 - 139/67)  BP(mean): --  RR: 18 (03 Dec 2024 05:07) (17 - 18)  SpO2: 98% (03 Dec 2024 05:07) (93% - 98%)    Parameters below as of 03 Dec 2024 05:07  Patient On (Oxygen Delivery Method): room air      REVIEW OF SYSTEMS:  CONSTITUTIONAL:  The patient denies fever, chills, or night sweats.[id number]  HEAD:  No headache.  EYES:  No double vision or blurry vision.  EARS:  No ringing in ears.  NECK:  No neck pain.  CARDIOVASCULAR:  No chest pain.  RESPIRATORY:  No shortness of breath.  ABDOMEN:  No nausea, vomiting, or abdominal pain.  EXTREMITIES/NEUROLOGICAL:  No numbness or tingling.  GENERAL:  Positive back pain that runs down to her left buttocks.  No problems passing urine or stools.    PHYSICAL EXAMINATION:  HEAD:  Normocephalic, atraumatic.  EYES:  No scleral icterus.  EARS:  Hearing bilaterally intact.  NECK:  Supple.  CARDIOVASCULAR:  S1 and S2 heard .  RESPIRATORY:  Air entry bilaterally.  ABDOMEN:  Soft, nontender.  EXTREMITIES:  No clubbing or cyanosis was noted.    NEUROLOGIC:  The patient is awake, alert, and oriented x3.  Extraocular movements are intact.  Speech was fluent.  Smile symmetric.  Motor, bilateral upper 5/5, right lower 4+ out of 5 left 4-/5 proximally secondary to pain.  Sensory, bilaterally intact to light touch.      LABS:            Hemoglobin A1C:       Vitamin B12         RADIOLOGY  CLINICAL INDICATION: Low back pain with left lower extremity radiculopathy    TECHNIQUE: Multiplanar, multisequence MR images of the lumbar spine were   obtained without the administration of intravenous contrast.    COMPARISON: CT lumbar spine 11/30/2024    FINDINGS:    Vertebral body height, marrow signal homogeneity, and facet alignment are   maintained throughout the visualized spinal segments.    Mild disc space narrowing at L2-L3 with Modic type II endplate   degenerative changes.    The conus is normal in size, position, and signal characteristics, ending   at L1.    Descending lumbosacral nerve roots are not nodular or thickened.    T12-L1: No spinal canal stenosis or neural foraminal narrowing.    L1-L2: No spinal canal stenosis or neural foraminal narrowing.    L2-L3: Mild disc bulge. Mild bilateral facet/ligamentous hypertrophy.   Mild thecal sac compression. Mild right neural foraminal narrowing.    L3-L4: Mild disc bulge. Mild bilateral facet/ligamentous hypertrophy.   Mild thecal sac compression. Mild bilateral neural foraminal narrowing.    L4-L5: Mild disc bulge with superimposed left foraminal disc protrusion.   Bilateral facet/ligamentous hypertrophy. Mild thecal sac compression.   Mass effect upon the exiting left L4 nerve root. Mild right neural   foraminal narrowing.    L5-S1: Mild broad-based disc protrusion minimally deforms the ventral   thecal sac. Mild bilateral facet hypertrophy. No neural foraminal   narrowing.    IMPRESSION:  L4-L5 mild disc bulge with superimposed left foraminal disc protrusion   results in mass effect upon the exiting left L4 nerve root.    Other milder degenerative changes.  ANALYSIS AND PLAN:  This is a 64-year-old with lower back pain.    1.For lower back pain, questionable could this be secondary to lumbar radiculopathy being exacerbated versus possibly a type of renal stone.  2.fall precautions   3.We will recommend pain management.  4.For history of hypothyroidism, continue Synthroid.  5.We will try Lidoderm patch.  6.MRI imaging, noted Ortho called will see patient  thanks   7.52 minutes of time spent with the patient, plan of care, reviewing data, speaking to multidisciplinary health care team with greater than 50% of time counseling and care coordination.  8.Thank you for courtesy of consultation.

## 2024-12-03 NOTE — PROGRESS NOTE ADULT - SUBJECTIVE AND OBJECTIVE BOX
Patient is a 64y old  Female who presents with a chief complaint of back pain (03 Dec 2024 10:00)    Date of servie : 12-03-24 @ 13:14  INTERVAL HPI/OVERNIGHT EVENTS:  T(C): 37.3 (12-03-24 @ 12:25), Max: 37.3 (12-03-24 @ 12:25)  HR: 57 (12-03-24 @ 12:25) (57 - 73)  BP: 130/75 (12-03-24 @ 12:25) (120/62 - 139/67)  RR: 18 (12-03-24 @ 12:25) (18 - 18)  SpO2: 95% (12-03-24 @ 12:25) (95% - 98%)  Wt(kg): --  I&O's Summary      LABS:              CAPILLARY BLOOD GLUCOSE                MEDICATIONS  (STANDING):  acetaminophen     Tablet .. 1000 milliGRAM(s) Oral every 8 hours  cefTRIAXone   IVPB      cefTRIAXone   IVPB 1000 milliGRAM(s) IV Intermittent every 24 hours  enoxaparin Injectable 40 milliGRAM(s) SubCutaneous every 24 hours  HYDROmorphone   Tablet 4 milliGRAM(s) Oral every 4 hours  influenza   Vaccine 0.5 milliLiter(s) IntraMuscular once  levothyroxine 100 MICROGram(s) Oral daily  lidocaine   4% Patch 1 Patch Transdermal daily  methylPREDNISolone sodium succinate Injectable 40 milliGRAM(s) IV Push two times a day  polyethylene glycol 3350 17 Gram(s) Oral two times a day    MEDICATIONS  (PRN):  baclofen 5 milliGRAM(s) Oral every 8 hours PRN Muscle Spasm  bisacodyl 5 milliGRAM(s) Oral every 12 hours PRN Constipation  lactulose Syrup 10 Gram(s) Oral every 24 hours PRN constipation  morphine  - Injectable 2 milliGRAM(s) IV Push every 4 hours PRN Severe Pain (7 - 10)  oxyCODONE    IR 5 milliGRAM(s) Oral every 4 hours PRN Moderate Pain (4 - 6)  senna 2 Tablet(s) Oral at bedtime PRN Constipation          PHYSICAL EXAM:  GENERAL: NAD, well-groomed, well-developed  HEAD:  Atraumatic, Normocephalic  CHEST/LUNG: Clear to percussion bilaterally; No rales, rhonchi, wheezing, or rubs  HEART: Regular rate and rhythm; No murmurs, rubs, or gallops  ABDOMEN: Soft, Nontender, Nondistended; Bowel sounds present  EXTREMITIES:  2+ Peripheral Pulses, No clubbing, cyanosis, or edema  LYMPH: No lymphadenopathy noted  SKIN: No rashes or lesions    Care Discussed with Consultants/Other Providers [ ] YES  [ ] NO
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
## 2024-12-03 NOTE — PROGRESS NOTE ADULT - SUBJECTIVE AND OBJECTIVE BOX
[facility], Division of Infectious Diseases  MAEVE Garcia Y. Patel, S. Shah, [facility]  [phone]    Name: NAIDA FAM  Age: 64y  Gender: Female  MRN: [id number]    Interval History:  Patient seen and examined at bedside   No acute overnight events. Afebrile  No complaints  Notes reviewed    Antibiotics:  cefTRIAXone   IVPB      cefTRIAXone   IVPB 1000 milliGRAM(s) IV Intermittent every 24 hours      Medications:  acetaminophen     Tablet .. 1000 milliGRAM(s) Oral every 8 hours  baclofen 5 milliGRAM(s) Oral every 8 hours PRN  bisacodyl 5 milliGRAM(s) Oral every 12 hours PRN  cefTRIAXone   IVPB      cefTRIAXone   IVPB 1000 milliGRAM(s) IV Intermittent every 24 hours  enoxaparin Injectable 40 milliGRAM(s) SubCutaneous every 24 hours  HYDROmorphone   Tablet 4 milliGRAM(s) Oral every 4 hours  influenza   Vaccine 0.5 milliLiter(s) IntraMuscular once  lactulose Syrup 10 Gram(s) Oral every 24 hours PRN  levothyroxine 100 MICROGram(s) Oral daily  lidocaine   4% Patch 1 Patch Transdermal daily  methylPREDNISolone sodium succinate Injectable 40 milliGRAM(s) IV Push two times a day  morphine  - Injectable 2 milliGRAM(s) IV Push every 4 hours PRN  oxyCODONE    IR 5 milliGRAM(s) Oral every 4 hours PRN  polyethylene glycol 3350 17 Gram(s) Oral two times a day  senna 2 Tablet(s) Oral at bedtime PRN      Review of Systems:  A 10-point review of systems was obtained.   Review of systems otherwise negative except as previously noted.    Allergies: penicillin (Hives)    For details regarding the patient's past medical history, social history, family history, and other miscellaneous elements, please refer the initial infectious diseases consultation and/or the admitting history and physical examination for this admission.    Objective:  Vitals:   T(C): 37.3 (12-03-24 @ 12:25), Max: 37.3 (12-03-24 @ 12:25)  HR: 57 (12-03-24 @ 12:25) (57 - 73)  BP: 130/75 (12-03-24 @ 12:25) (120/62 - 139/67)  RR: 18 (12-03-24 @ 12:25) (18 - 18)  SpO2: 95% (12-03-24 @ 12:25) (95% - 98%)    Physical Examination:  General: no acute distress  HEENT: NC/AT, EOMI,   Cardio: RRR  Resp: decreased breath sounds  Abd: soft, NT, ND,   Ext: no edema or cyanosis  Skin: warm, dry, no visible rash      Laboratory Studies:  CBC:   CMP:             Microbiology: reviewed    Culture - Urine (collected 12-01-24 @ 03:25)  Source: Clean Catch  Final Report (12-02-24 @ 11:56):    <10,000 CFU/mL Normal Urogenital Monique    Urinalysis with Rflx Culture (collected 12-01-24 @ 03:25)          Radiology: reviewed

## 2024-12-04 LAB
ALBUMIN SERPL ELPH-MCNC: 3.4 G/DL — SIGNIFICANT CHANGE UP (ref 3.3–5)
ALP SERPL-CCNC: 72 U/L — SIGNIFICANT CHANGE UP (ref 40–120)
ALT FLD-CCNC: 22 U/L — SIGNIFICANT CHANGE UP (ref 12–78)
ANION GAP SERPL CALC-SCNC: 4 MMOL/L — LOW (ref 5–17)
AST SERPL-CCNC: 13 U/L — LOW (ref 15–37)
BILIRUB SERPL-MCNC: 0.3 MG/DL — SIGNIFICANT CHANGE UP (ref 0.2–1.2)
BUN SERPL-MCNC: 21 MG/DL — SIGNIFICANT CHANGE UP (ref 7–23)
CALCIUM SERPL-MCNC: 9.3 MG/DL — SIGNIFICANT CHANGE UP (ref 8.5–10.1)
CHLORIDE SERPL-SCNC: 107 MMOL/L — SIGNIFICANT CHANGE UP (ref 96–108)
CO2 SERPL-SCNC: 27 MMOL/L — SIGNIFICANT CHANGE UP (ref 22–31)
CREAT SERPL-MCNC: 0.67 MG/DL — SIGNIFICANT CHANGE UP (ref 0.5–1.3)
EGFR: 99 ML/MIN/1.73M2 — SIGNIFICANT CHANGE UP
GLUCOSE SERPL-MCNC: 108 MG/DL — HIGH (ref 70–99)
HCT VFR BLD CALC: 39.8 % — SIGNIFICANT CHANGE UP (ref 34.5–45)
HGB BLD-MCNC: 13.2 G/DL — SIGNIFICANT CHANGE UP (ref 11.5–15.5)
MCHC RBC-ENTMCNC: 31.2 PG — SIGNIFICANT CHANGE UP (ref 27–34)
MCHC RBC-ENTMCNC: 33.2 G/DL — SIGNIFICANT CHANGE UP (ref 32–36)
MCV RBC AUTO: 94.1 FL — SIGNIFICANT CHANGE UP (ref 80–100)
NRBC # BLD: 0 /100 WBCS — SIGNIFICANT CHANGE UP (ref 0–0)
PLATELET # BLD AUTO: 313 K/UL — SIGNIFICANT CHANGE UP (ref 150–400)
POTASSIUM SERPL-MCNC: 4.3 MMOL/L — SIGNIFICANT CHANGE UP (ref 3.5–5.3)
POTASSIUM SERPL-SCNC: 4.3 MMOL/L — SIGNIFICANT CHANGE UP (ref 3.5–5.3)
PROT SERPL-MCNC: 7.2 G/DL — SIGNIFICANT CHANGE UP (ref 6–8.3)
RBC # BLD: 4.23 M/UL — SIGNIFICANT CHANGE UP (ref 3.8–5.2)
RBC # FLD: 12.1 % — SIGNIFICANT CHANGE UP (ref 10.3–14.5)
SODIUM SERPL-SCNC: 138 MMOL/L — SIGNIFICANT CHANGE UP (ref 135–145)
WBC # BLD: 12.32 K/UL — HIGH (ref 3.8–10.5)
WBC # FLD AUTO: 12.32 K/UL — HIGH (ref 3.8–10.5)

## 2024-12-04 RX ADMIN — HYDROMORPHONE HYDROCHLORIDE 4 MILLIGRAM(S): 2 TABLET ORAL at 22:00

## 2024-12-04 RX ADMIN — HYDROMORPHONE HYDROCHLORIDE 4 MILLIGRAM(S): 2 TABLET ORAL at 15:28

## 2024-12-04 RX ADMIN — HYDROMORPHONE HYDROCHLORIDE 4 MILLIGRAM(S): 2 TABLET ORAL at 21:14

## 2024-12-04 RX ADMIN — HYDROMORPHONE HYDROCHLORIDE 4 MILLIGRAM(S): 2 TABLET ORAL at 05:29

## 2024-12-04 RX ADMIN — LIDOCAINE 1 PATCH: 40 CREAM TOPICAL at 00:30

## 2024-12-04 RX ADMIN — Medication 100 MILLIGRAM(S): at 18:45

## 2024-12-04 RX ADMIN — POLYETHYLENE GLYCOL 3350 17 GRAM(S): 17 POWDER, FOR SOLUTION ORAL at 05:31

## 2024-12-04 RX ADMIN — Medication 5 MILLIGRAM(S): at 12:31

## 2024-12-04 RX ADMIN — ACETAMINOPHEN 500MG 1000 MILLIGRAM(S): 500 TABLET, COATED ORAL at 15:29

## 2024-12-04 RX ADMIN — POLYETHYLENE GLYCOL 3350 17 GRAM(S): 17 POWDER, FOR SOLUTION ORAL at 18:47

## 2024-12-04 RX ADMIN — ACETAMINOPHEN 500MG 1000 MILLIGRAM(S): 500 TABLET, COATED ORAL at 05:29

## 2024-12-04 RX ADMIN — HYDROMORPHONE HYDROCHLORIDE 4 MILLIGRAM(S): 2 TABLET ORAL at 10:20

## 2024-12-04 RX ADMIN — ENOXAPARIN SODIUM 40 MILLIGRAM(S): 30 INJECTION SUBCUTANEOUS at 08:33

## 2024-12-04 RX ADMIN — HYDROMORPHONE HYDROCHLORIDE 4 MILLIGRAM(S): 2 TABLET ORAL at 09:56

## 2024-12-04 RX ADMIN — HYDROMORPHONE HYDROCHLORIDE 4 MILLIGRAM(S): 2 TABLET ORAL at 15:06

## 2024-12-04 RX ADMIN — Medication 40 MILLIGRAM(S): at 18:48

## 2024-12-04 RX ADMIN — Medication 40 MILLIGRAM(S): at 05:29

## 2024-12-04 RX ADMIN — ACETAMINOPHEN 500MG 1000 MILLIGRAM(S): 500 TABLET, COATED ORAL at 21:14

## 2024-12-04 RX ADMIN — LIDOCAINE 1 PATCH: 40 CREAM TOPICAL at 19:30

## 2024-12-04 RX ADMIN — LIDOCAINE 1 PATCH: 40 CREAM TOPICAL at 12:21

## 2024-12-04 RX ADMIN — Medication 100 MICROGRAM(S): at 05:30

## 2024-12-04 RX ADMIN — HYDROMORPHONE HYDROCHLORIDE 4 MILLIGRAM(S): 2 TABLET ORAL at 02:01

## 2024-12-04 RX ADMIN — ACETAMINOPHEN 500MG 1000 MILLIGRAM(S): 500 TABLET, COATED ORAL at 22:00

## 2024-12-04 RX ADMIN — HYDROMORPHONE HYDROCHLORIDE 4 MILLIGRAM(S): 2 TABLET ORAL at 18:47

## 2024-12-04 RX ADMIN — HYDROMORPHONE HYDROCHLORIDE 4 MILLIGRAM(S): 2 TABLET ORAL at 06:20

## 2024-12-04 RX ADMIN — ACETAMINOPHEN 500MG 1000 MILLIGRAM(S): 500 TABLET, COATED ORAL at 06:20

## 2024-12-04 RX ADMIN — HYDROMORPHONE HYDROCHLORIDE 4 MILLIGRAM(S): 2 TABLET ORAL at 02:55

## 2024-12-04 RX ADMIN — ACETAMINOPHEN 500MG 1000 MILLIGRAM(S): 500 TABLET, COATED ORAL at 15:06

## 2024-12-04 NOTE — PROGRESS NOTE ADULT - SUBJECTIVE AND OBJECTIVE BOX
Neurology follow up note    NAIDA [id number]      Interval History:    Patient feels ok no new complaints.    Allergies    penicillin (Hives)    Intolerances        MEDICATIONS    acetaminophen     Tablet .. 1000 milliGRAM(s) Oral every 8 hours  baclofen 5 milliGRAM(s) Oral every 8 hours PRN  bisacodyl 5 milliGRAM(s) Oral every 12 hours PRN  cefTRIAXone   IVPB      cefTRIAXone   IVPB 1000 milliGRAM(s) IV Intermittent every 24 hours  enoxaparin Injectable 40 milliGRAM(s) SubCutaneous every 24 hours  HYDROmorphone   Tablet 4 milliGRAM(s) Oral every 4 hours  influenza   Vaccine 0.5 milliLiter(s) IntraMuscular once  lactulose Syrup 10 Gram(s) Oral every 24 hours PRN  levothyroxine 100 MICROGram(s) Oral daily  lidocaine   4% Patch 1 Patch Transdermal daily  methylPREDNISolone sodium succinate Injectable 40 milliGRAM(s) IV Push two times a day  morphine  - Injectable 2 milliGRAM(s) IV Push every 4 hours PRN  oxyCODONE    IR 5 milliGRAM(s) Oral every 4 hours PRN  polyethylene glycol 3350 17 Gram(s) Oral two times a day  senna 2 Tablet(s) Oral at bedtime PRN              Vital Signs Last 24 Hrs  T(C): 36.6 (04 Dec 2024 05:32), Max: 37.3 (03 Dec 2024 12:25)  T(F): 97.8 (04 Dec 2024 05:32), Max: 99.2 (03 Dec 2024 12:25)  HR: 59 (04 Dec 2024 05:32) (57 - 59)  BP: 117/67 (04 Dec 2024 05:32) (117/67 - 130/75)  BP(mean): --  RR: 18 (04 Dec 2024 05:32) (18 - 18)  SpO2: 96% (04 Dec 2024 05:32) (95% - 98%)    Parameters below as of 04 Dec 2024 05:32  Patient On (Oxygen Delivery Method): room air      REVIEW OF SYSTEMS:  CONSTITUTIONAL:  The patient denies fever, chills, or night sweats.[id number]  HEAD:  No headache.  EYES:  No double vision or blurry vision.  EARS:  No ringing in ears.  NECK:  No neck pain.  CARDIOVASCULAR:  No chest pain.  RESPIRATORY:  No shortness of breath.  ABDOMEN:  No nausea, vomiting, or abdominal pain.  EXTREMITIES/NEUROLOGICAL:  No numbness or tingling.  GENERAL:  Positive back pain that runs down to her left buttocks.  No problems passing urine or stools.    PHYSICAL EXAMINATION:  HEAD:  Normocephalic, atraumatic.  EYES:  No scleral icterus.  EARS:  Hearing bilaterally intact.  NECK:  Supple.  CARDIOVASCULAR:  S1 and S2 heard .  RESPIRATORY:  Air entry bilaterally.  ABDOMEN:  Soft, nontender.  EXTREMITIES:  No clubbing or cyanosis was noted.    NEUROLOGIC:  The patient is awake, alert, and oriented x3.  Extraocular movements are intact.  Speech was fluent.  Smile symmetric.  Motor, bilateral upper 5/5, right lower 4+ out of 5 left 4-/5 proximally secondary to pain.  Sensory, bilaterally intact to light touch.      LABS:            Hemoglobin A1C:       Vitamin B12         RADIOLOGY  CLINICAL INDICATION: Low back pain with left lower extremity radiculopathy    TECHNIQUE: Multiplanar, multisequence MR images of the lumbar spine were   obtained without the administration of intravenous contrast.    COMPARISON: CT lumbar spine 11/30/2024    FINDINGS:    Vertebral body height, marrow signal homogeneity, and facet alignment are   maintained throughout the visualized spinal segments.    Mild disc space narrowing at L2-L3 with Modic type II endplate   degenerative changes.    The conus is normal in size, position, and signal characteristics, ending   at L1.    Descending lumbosacral nerve roots are not nodular or thickened.    T12-L1: No spinal canal stenosis or neural foraminal narrowing.    L1-L2: No spinal canal stenosis or neural foraminal narrowing.    L2-L3: Mild disc bulge. Mild bilateral facet/ligamentous hypertrophy.   Mild thecal sac compression. Mild right neural foraminal narrowing.    L3-L4: Mild disc bulge. Mild bilateral facet/ligamentous hypertrophy.   Mild thecal sac compression. Mild bilateral neural foraminal narrowing.    L4-L5: Mild disc bulge with superimposed left foraminal disc protrusion.   Bilateral facet/ligamentous hypertrophy. Mild thecal sac compression.   Mass effect upon the exiting left L4 nerve root. Mild right neural   foraminal narrowing.    L5-S1: Mild broad-based disc protrusion minimally deforms the ventral   thecal sac. Mild bilateral facet hypertrophy. No neural foraminal   narrowing.    IMPRESSION:  L4-L5 mild disc bulge with superimposed left foraminal disc protrusion   results in mass effect upon the exiting left L4 nerve root.    Other milder degenerative changes.  ANALYSIS AND PLAN:  This is a 64-year-old with lower back pain.    1.For lower back pain, questionable could this be secondary to lumbar radiculopathy being exacerbated versus possibly a type of renal stone.  2.fall precautions   3.We will recommend pain management.  4.For history of hypothyroidism, continue Synthroid.  5.We will try Lidoderm patch.  6.MRI imaging, noted Ortho thanks   7.52 minutes of time spent with the patient, plan of care, reviewing data, speaking to multidisciplinary health care team with greater than 50% of time counseling and care coordination.  8.Thank you for courtesy of consultation.  PATIENT HAS NOT YET BEEN SEEN AND EXAMINED TODAY. NOTE AND CHART REVIEWED IN AM AND EXAM FORM PREVIOUS.  ONCE PATIENT SEEN, CHART WILL BE UPDATE AT PRESENT NOTE IS INCOMPLETE     Neurology follow up note    NAIDA [id number]      Interval History:    Patient feels less pain     Allergies    penicillin (Hives)    Intolerances        MEDICATIONS    acetaminophen     Tablet .. 1000 milliGRAM(s) Oral every 8 hours  baclofen 5 milliGRAM(s) Oral every 8 hours PRN  bisacodyl 5 milliGRAM(s) Oral every 12 hours PRN  cefTRIAXone   IVPB      cefTRIAXone   IVPB 1000 milliGRAM(s) IV Intermittent every 24 hours  enoxaparin Injectable 40 milliGRAM(s) SubCutaneous every 24 hours  HYDROmorphone   Tablet 4 milliGRAM(s) Oral every 4 hours  influenza   Vaccine 0.5 milliLiter(s) IntraMuscular once  lactulose Syrup 10 Gram(s) Oral every 24 hours PRN  levothyroxine 100 MICROGram(s) Oral daily  lidocaine   4% Patch 1 Patch Transdermal daily  methylPREDNISolone sodium succinate Injectable 40 milliGRAM(s) IV Push two times a day  morphine  - Injectable 2 milliGRAM(s) IV Push every 4 hours PRN  oxyCODONE    IR 5 milliGRAM(s) Oral every 4 hours PRN  polyethylene glycol 3350 17 Gram(s) Oral two times a day  senna 2 Tablet(s) Oral at bedtime PRN              Vital Signs Last 24 Hrs  T(C): 36.6 (04 Dec 2024 05:32), Max: 37.3 (03 Dec 2024 12:25)  T(F): 97.8 (04 Dec 2024 05:32), Max: 99.2 (03 Dec 2024 12:25)  HR: 59 (04 Dec 2024 05:32) (57 - 59)  BP: 117/67 (04 Dec 2024 05:32) (117/67 - 130/75)  BP(mean): --  RR: 18 (04 Dec 2024 05:32) (18 - 18)  SpO2: 96% (04 Dec 2024 05:32) (95% - 98%)    Parameters below as of 04 Dec 2024 05:32  Patient On (Oxygen Delivery Method): room air      REVIEW OF SYSTEMS:  CONSTITUTIONAL:  The patient denies fever, chills, or night sweats.[id number]  HEAD:  No headache.  EYES:  No double vision or blurry vision.  EARS:  No ringing in ears.  NECK:  No neck pain.  CARDIOVASCULAR:  No chest pain.  RESPIRATORY:  No shortness of breath.  ABDOMEN:  No nausea, vomiting, or abdominal pain.  EXTREMITIES/NEUROLOGICAL:  No numbness or tingling.  GENERAL:  Positive back pain that runs down to her left buttocks.  No problems passing urine or stools.    PHYSICAL EXAMINATION:  HEAD:  Normocephalic, atraumatic.  EYES:  No scleral icterus.  EARS:  Hearing bilaterally intact.  NECK:  Supple.  CARDIOVASCULAR:  S1 and S2 heard .  RESPIRATORY:  Air entry bilaterally.  ABDOMEN:  Soft, nontender.  EXTREMITIES:  No clubbing or cyanosis was noted.    NEUROLOGIC:  The patient is awake, alert, and oriented x3.  Extraocular movements are intact.  Speech was fluent.  Smile symmetric.  Motor, bilateral upper 5/5, right lower 4+ out of 5 left 4-/5 proximally secondary to pain.  Sensory, bilaterally intact to light touch.      LABS:            Hemoglobin A1C:       Vitamin B12         RADIOLOGY  CLINICAL INDICATION: Low back pain with left lower extremity radiculopathy    TECHNIQUE: Multiplanar, multisequence MR images of the lumbar spine were   obtained without the administration of intravenous contrast.    COMPARISON: CT lumbar spine 11/30/2024    FINDINGS:    Vertebral body height, marrow signal homogeneity, and facet alignment are   maintained throughout the visualized spinal segments.    Mild disc space narrowing at L2-L3 with Modic type II endplate   degenerative changes.    The conus is normal in size, position, and signal characteristics, ending   at L1.    Descending lumbosacral nerve roots are not nodular or thickened.    T12-L1: No spinal canal stenosis or neural foraminal narrowing.    L1-L2: No spinal canal stenosis or neural foraminal narrowing.    L2-L3: Mild disc bulge. Mild bilateral facet/ligamentous hypertrophy.   Mild thecal sac compression. Mild right neural foraminal narrowing.    L3-L4: Mild disc bulge. Mild bilateral facet/ligamentous hypertrophy.   Mild thecal sac compression. Mild bilateral neural foraminal narrowing.    L4-L5: Mild disc bulge with superimposed left foraminal disc protrusion.   Bilateral facet/ligamentous hypertrophy. Mild thecal sac compression.   Mass effect upon the exiting left L4 nerve root. Mild right neural   foraminal narrowing.    L5-S1: Mild broad-based disc protrusion minimally deforms the ventral   thecal sac. Mild bilateral facet hypertrophy. No neural foraminal   narrowing.    IMPRESSION:  L4-L5 mild disc bulge with superimposed left foraminal disc protrusion   results in mass effect upon the exiting left L4 nerve root.    Other milder degenerative changes.  ANALYSIS AND PLAN:  This is a 64-year-old with lower back pain.    1.For lower back pain, questionable could this be secondary to lumbar radiculopathy being exacerbated versus possibly a type of renal stone.  2.fall precautions   3.We will recommend pain management.  4.For history of hypothyroidism, continue Synthroid.  5.We will try Lidoderm patch.  6.MRI imaging, noted Ortho thanks   7 less pain today 12/4  .50 minutes of time spent with the patient, plan of care, reviewing data, speaking to multidisciplinary health care team with greater than 50% of time counseling and care coordination.

## 2024-12-04 NOTE — PROGRESS NOTE ADULT - SUBJECTIVE AND OBJECTIVE BOX
[facility], Division of Infectious Diseases  MAEVE Garcia Y. Patel, S. Shah, G. [facility]  [phone]    Name: NAIDA FAM  Age: 64y  Gender: Female  MRN: [id number]    Interval History:  Patient seen and examined at bedside   Still having back pain  Notes reviewed    Antibiotics:  cefTRIAXone   IVPB      cefTRIAXone   IVPB 1000 milliGRAM(s) IV Intermittent every 24 hours      Medications:  acetaminophen     Tablet .. 1000 milliGRAM(s) Oral every 8 hours  baclofen 5 milliGRAM(s) Oral every 8 hours PRN  bisacodyl 5 milliGRAM(s) Oral every 12 hours PRN  cefTRIAXone   IVPB      cefTRIAXone   IVPB 1000 milliGRAM(s) IV Intermittent every 24 hours  enoxaparin Injectable 40 milliGRAM(s) SubCutaneous every 24 hours  HYDROmorphone   Tablet 4 milliGRAM(s) Oral every 4 hours  influenza   Vaccine 0.5 milliLiter(s) IntraMuscular once  lactulose Syrup 10 Gram(s) Oral every 24 hours PRN  levothyroxine 100 MICROGram(s) Oral daily  lidocaine   4% Patch 1 Patch Transdermal daily  methylPREDNISolone sodium succinate Injectable 40 milliGRAM(s) IV Push two times a day  morphine  - Injectable 2 milliGRAM(s) IV Push every 4 hours PRN  oxyCODONE    IR 5 milliGRAM(s) Oral every 4 hours PRN  polyethylene glycol 3350 17 Gram(s) Oral two times a day  senna 2 Tablet(s) Oral at bedtime PRN      Review of Systems:  A 10-point review of systems was obtained.     Review of systems otherwise negative except as previously noted.    Allergies: penicillin (Hives)    For details regarding the patient's past medical history, social history, family history, and other miscellaneous elements, please refer the initial infectious diseases consultation and/or the admitting history and physical examination for this admission.    Objective:  Vitals:   T(C): 36.4 (12-04-24 @ 11:48), Max: 36.6 (12-04-24 @ 05:32)  HR: 59 (12-04-24 @ 11:48) (59 - 59)  BP: 131/66 (12-04-24 @ 11:48) (117/67 - 131/66)  RR: 18 (12-04-24 @ 11:48) (18 - 18)  SpO2: 97% (12-04-24 @ 11:48) (96% - 97%)    Physical Examination:  General: no acute distress  HEENT: NC/AT, EOMI,   Cardio: S1, S2 heard, RRR, no murmurs  Resp: breath sounds heard bilaterally, no rales, wheezes or rhonchi  Abd: soft, NT, ND,   Ext: no edema or cyanosis  Skin: warm, dry, no visible rash      Laboratory Studies:  CBC:                       13.2   12.32 )-----------( 313      ( 04 Dec 2024 07:46 )             39.8     CMP: 12-04    138  |  107  |  21  ----------------------------<  108[H]  4.3   |  27  |  0.67    Ca    9.3      04 Dec 2024 07:46    TPro  7.2  /  Alb  3.4  /  TBili  0.3  /  DBili  x   /  AST  13[L]  /  ALT  22  /  AlkPhos  72  12-04    LIVER FUNCTIONS - ( 04 Dec 2024 07:46 )  Alb: 3.4 g/dL / Pro: 7.2 g/dL / ALK PHOS: 72 U/L / ALT: 22 U/L / AST: 13 U/L / GGT: x           Urinalysis Basic - ( 04 Dec 2024 07:46 )    Color: x / Appearance: x / SG: x / pH: x  Gluc: 108 mg/dL / Ketone: x  / Bili: x / Urobili: x   Blood: x / Protein: x / Nitrite: x   Leuk Esterase: x / RBC: x / WBC x   Sq Epi: x / Non Sq Epi: x / Bacteria: x        Microbiology: reviewed    Culture - Urine (collected 12-01-24 @ 03:25)  Source: Clean Catch  Final Report (12-02-24 @ 11:56):    <10,000 CFU/mL Normal Urogenital Monique    Urinalysis with Rflx Culture (collected 12-01-24 @ 03:25)          Radiology: reviewed

## 2024-12-04 NOTE — CASE MANAGEMENT PROGRESS NOTE - NSCMPROGRESSNOTE_GEN_ALL_CORE
Patient discussed in rounds. As per ID, las dose of Rocephin is tomorrow.  Patient is independent.  Discharge disposition is home with outpatient PT.  Script will be given to patient. CM remains available throughout hospital stay.

## 2024-12-04 NOTE — PROGRESS NOTE ADULT - SUBJECTIVE AND OBJECTIVE BOX
Patient is a 64y old  Female who presents with a chief complaint of back pain (04 Dec 2024 14:03)    Date of servie : 12-04-24 @ 14:35  INTERVAL HPI/OVERNIGHT EVENTS:  T(C): 36.4 (12-04-24 @ 11:48), Max: 36.6 (12-04-24 @ 05:32)  HR: 59 (12-04-24 @ 11:48) (59 - 59)  BP: 131/66 (12-04-24 @ 11:48) (117/67 - 131/66)  RR: 18 (12-04-24 @ 11:48) (18 - 18)  SpO2: 97% (12-04-24 @ 11:48) (96% - 97%)  Wt(kg): --  I&O's Summary      LABS:                        13.2   12.32 )-----------( 313      ( 04 Dec 2024 07:46 )             39.8     12-04    138  |  107  |  21  ----------------------------<  108[H]  4.3   |  27  |  0.67    Ca    9.3      04 Dec 2024 07:46    TPro  7.2  /  Alb  3.4  /  TBili  0.3  /  DBili  x   /  AST  13[L]  /  ALT  22  /  AlkPhos  72  12-04      Urinalysis Basic - ( 04 Dec 2024 07:46 )    Color: x / Appearance: x / SG: x / pH: x  Gluc: 108 mg/dL / Ketone: x  / Bili: x / Urobili: x   Blood: x / Protein: x / Nitrite: x   Leuk Esterase: x / RBC: x / WBC x   Sq Epi: x / Non Sq Epi: x / Bacteria: x      CAPILLARY BLOOD GLUCOSE            Urinalysis Basic - ( 04 Dec 2024 07:46 )    Color: x / Appearance: x / SG: x / pH: x  Gluc: 108 mg/dL / Ketone: x  / Bili: x / Urobili: x   Blood: x / Protein: x / Nitrite: x   Leuk Esterase: x / RBC: x / WBC x   Sq Epi: x / Non Sq Epi: x / Bacteria: x        MEDICATIONS  (STANDING):  acetaminophen     Tablet .. 1000 milliGRAM(s) Oral every 8 hours  cefTRIAXone   IVPB      cefTRIAXone   IVPB 1000 milliGRAM(s) IV Intermittent every 24 hours  enoxaparin Injectable 40 milliGRAM(s) SubCutaneous every 24 hours  HYDROmorphone   Tablet 4 milliGRAM(s) Oral every 4 hours  influenza   Vaccine 0.5 milliLiter(s) IntraMuscular once  levothyroxine 100 MICROGram(s) Oral daily  lidocaine   4% Patch 1 Patch Transdermal daily  methylPREDNISolone sodium succinate Injectable 40 milliGRAM(s) IV Push two times a day  polyethylene glycol 3350 17 Gram(s) Oral two times a day    MEDICATIONS  (PRN):  baclofen 5 milliGRAM(s) Oral every 8 hours PRN Muscle Spasm  bisacodyl 5 milliGRAM(s) Oral every 12 hours PRN Constipation  lactulose Syrup 10 Gram(s) Oral every 24 hours PRN constipation  morphine  - Injectable 2 milliGRAM(s) IV Push every 4 hours PRN Severe Pain (7 - 10)  oxyCODONE    IR 5 milliGRAM(s) Oral every 4 hours PRN Moderate Pain (4 - 6)  senna 2 Tablet(s) Oral at bedtime PRN Constipation          PHYSICAL EXAM:  GENERAL: NAD, well-groomed, well-developed  HEAD:  Atraumatic, Normocephalic  CHEST/LUNG: Clear to percussion bilaterally; No rales, rhonchi, wheezing, or rubs  HEART: Regular rate and rhythm; No murmurs, rubs, or gallops  ABDOMEN: Soft, Nontender, Nondistended; Bowel sounds present  EXTREMITIES:  2+ Peripheral Pulses, No clubbing, cyanosis, or edema  LYMPH: No lymphadenopathy noted  SKIN: No rashes or lesions    Care Discussed with Consultants/Other Providers [ ] YES  [ ] NO

## 2024-12-04 NOTE — PROGRESS NOTE ADULT - SUBJECTIVE AND OBJECTIVE BOX
Chief Complaint: Back pain    Interval Events: No events overnight.    Review of Systems:  General: No fevers, chills, weight gain  Skin: No rashes, color changes  Cardiovascular: No chest pain, orthopnea  Respiratory: No shortness of breath, cough  Gastrointestinal: No nausea, abdominal pain  Genitourinary: No incontinence, pain with urination  Musculoskeletal: No pain, swelling, decreased range of motion  Neurological: No headache, weakness  Psychiatric: No depression, anxiety  Endocrine: No weight gain, increased thirst  All other systems are comprehensively negative.    Physical Exam:  Vital Signs Last 24 Hrs  T(C): 36.6 (04 Dec 2024 05:32), Max: 37.3 (03 Dec 2024 12:25)  T(F): 97.8 (04 Dec 2024 05:32), Max: 99.2 (03 Dec 2024 12:25)  HR: 59 (04 Dec 2024 05:32) (57 - 59)  BP: 117/67 (04 Dec 2024 05:32) (117/67 - 130/75)  BP(mean): --  RR: 18 (04 Dec 2024 05:32) (18 - 18)  SpO2: 96% (04 Dec 2024 05:32) (95% - 98%)  Parameters below as of 04 Dec 2024 05:32  Patient On (Oxygen Delivery Method): room air  General: NAD  HEENT: MMM  Neck: No JVD, no carotid bruit  Lungs: CTAB  CV: RRR, nl S1/S2, no M/R/G  Abdomen: S/NT/ND, +BS  Extremities: No LE edema, no cyanosis  Neuro: AAOx3, non-focal  Skin: No rash    Labs:

## 2024-12-05 RX ORDER — CEFTRIAXONE SODIUM 1 G
1000 VIAL (EA) INJECTION ONCE
Refills: 0 | Status: COMPLETED | OUTPATIENT
Start: 2024-12-05 | End: 2024-12-05

## 2024-12-05 RX ORDER — PREDNISONE 20 MG/1
20 TABLET ORAL DAILY
Refills: 0 | Status: DISCONTINUED | OUTPATIENT
Start: 2024-12-05 | End: 2024-12-06

## 2024-12-05 RX ADMIN — Medication 100 MICROGRAM(S): at 05:09

## 2024-12-05 RX ADMIN — ENOXAPARIN SODIUM 40 MILLIGRAM(S): 30 INJECTION SUBCUTANEOUS at 09:05

## 2024-12-05 RX ADMIN — HYDROMORPHONE HYDROCHLORIDE 4 MILLIGRAM(S): 2 TABLET ORAL at 22:59

## 2024-12-05 RX ADMIN — HYDROMORPHONE HYDROCHLORIDE 4 MILLIGRAM(S): 2 TABLET ORAL at 06:07

## 2024-12-05 RX ADMIN — Medication 100 MILLIGRAM(S): at 17:20

## 2024-12-05 RX ADMIN — Medication 40 MILLIGRAM(S): at 05:09

## 2024-12-05 RX ADMIN — LACTULOSE 10 GRAM(S): 10 SOLUTION ORAL at 09:05

## 2024-12-05 RX ADMIN — ACETAMINOPHEN 500MG 1000 MILLIGRAM(S): 500 TABLET, COATED ORAL at 21:59

## 2024-12-05 RX ADMIN — HYDROMORPHONE HYDROCHLORIDE 4 MILLIGRAM(S): 2 TABLET ORAL at 10:08

## 2024-12-05 RX ADMIN — ACETAMINOPHEN 500MG 1000 MILLIGRAM(S): 500 TABLET, COATED ORAL at 14:06

## 2024-12-05 RX ADMIN — LIDOCAINE 1 PATCH: 40 CREAM TOPICAL at 11:32

## 2024-12-05 RX ADMIN — HYDROMORPHONE HYDROCHLORIDE 4 MILLIGRAM(S): 2 TABLET ORAL at 01:24

## 2024-12-05 RX ADMIN — ACETAMINOPHEN 500MG 1000 MILLIGRAM(S): 500 TABLET, COATED ORAL at 22:59

## 2024-12-05 RX ADMIN — LIDOCAINE 1 PATCH: 40 CREAM TOPICAL at 00:30

## 2024-12-05 RX ADMIN — HYDROMORPHONE HYDROCHLORIDE 4 MILLIGRAM(S): 2 TABLET ORAL at 21:59

## 2024-12-05 RX ADMIN — HYDROMORPHONE HYDROCHLORIDE 4 MILLIGRAM(S): 2 TABLET ORAL at 05:09

## 2024-12-05 RX ADMIN — HYDROMORPHONE HYDROCHLORIDE 4 MILLIGRAM(S): 2 TABLET ORAL at 02:00

## 2024-12-05 RX ADMIN — HYDROMORPHONE HYDROCHLORIDE 4 MILLIGRAM(S): 2 TABLET ORAL at 14:06

## 2024-12-05 RX ADMIN — HYDROMORPHONE HYDROCHLORIDE 4 MILLIGRAM(S): 2 TABLET ORAL at 09:08

## 2024-12-05 RX ADMIN — ACETAMINOPHEN 500MG 1000 MILLIGRAM(S): 500 TABLET, COATED ORAL at 05:08

## 2024-12-05 RX ADMIN — LIDOCAINE 1 PATCH: 40 CREAM TOPICAL at 23:30

## 2024-12-05 RX ADMIN — Medication 5 MILLIGRAM(S): at 09:05

## 2024-12-05 RX ADMIN — HYDROMORPHONE HYDROCHLORIDE 4 MILLIGRAM(S): 2 TABLET ORAL at 17:18

## 2024-12-05 RX ADMIN — PREDNISONE 20 MILLIGRAM(S): 20 TABLET ORAL at 17:18

## 2024-12-05 RX ADMIN — POLYETHYLENE GLYCOL 3350 17 GRAM(S): 17 POWDER, FOR SOLUTION ORAL at 05:08

## 2024-12-05 RX ADMIN — LIDOCAINE 1 PATCH: 40 CREAM TOPICAL at 19:15

## 2024-12-05 NOTE — PROGRESS NOTE ADULT - SUBJECTIVE AND OBJECTIVE BOX
[facility], Division of Infectious Diseases  MAEVE Garcia Y. Patel, S. Shah, G. [facility]  [phone]    Name: NAIDA FAM  Age: 64y  Gender: Female  MRN: [id number]    Interval History:  Patient seen and examined at bedside  No acute overnight events. Afebrile  No complaints  Notes reviewed    Antibiotics:  cefTRIAXone   IVPB 1000 milliGRAM(s) IV Intermittent once      Medications:  acetaminophen     Tablet .. 1000 milliGRAM(s) Oral every 8 hours  baclofen 5 milliGRAM(s) Oral every 8 hours PRN  bisacodyl 5 milliGRAM(s) Oral every 12 hours PRN  cefTRIAXone   IVPB 1000 milliGRAM(s) IV Intermittent once  enoxaparin Injectable 40 milliGRAM(s) SubCutaneous every 24 hours  HYDROmorphone   Tablet 4 milliGRAM(s) Oral every 4 hours  influenza   Vaccine 0.5 milliLiter(s) IntraMuscular once  lactulose Syrup 10 Gram(s) Oral every 24 hours PRN  levothyroxine 100 MICROGram(s) Oral daily  lidocaine   4% Patch 1 Patch Transdermal daily  methylPREDNISolone sodium succinate Injectable 40 milliGRAM(s) IV Push two times a day  morphine  - Injectable 2 milliGRAM(s) IV Push every 4 hours PRN  oxyCODONE    IR 5 milliGRAM(s) Oral every 4 hours PRN  polyethylene glycol 3350 17 Gram(s) Oral two times a day  senna 2 Tablet(s) Oral at bedtime PRN      Review of Systems:  A 10-point review of systems was obtained.   Review of systems otherwise negative except as previously noted.    Allergies: penicillin (Hives)    For details regarding the patient's past medical history, social history, family history, and other miscellaneous elements, please refer the initial infectious diseases consultation and/or the admitting history and physical examination for this admission.    Objective:  Vitals:   T(C): 36.7 (12-05-24 @ 11:01), Max: 36.9 (12-04-24 @ 20:25)  HR: 75 (12-05-24 @ 11:01) (58 - 75)  BP: 130/72 (12-05-24 @ 11:01) (116/62 - 130/72)  RR: 18 (12-05-24 @ 11:01) (18 - 18)  SpO2: 95% (12-05-24 @ 11:01) (95% - 97%)    Physical Examination:  General: no acute distress  HEENT: NC/AT, EOMI,  Cardio: RRR  Resp: decreased breath sounds  Abd: soft, NT, ND  Ext: no edema or cyanosis  Skin: warm, dry, no visible rash      Laboratory Studies:  CBC:                       13.2   12.32 )-----------( 313      ( 04 Dec 2024 07:46 )             39.8     CMP: 12-04    138  |  107  |  21  ----------------------------<  108[H]  4.3   |  27  |  0.67    Ca    9.3      04 Dec 2024 07:46    TPro  7.2  /  Alb  3.4  /  TBili  0.3  /  DBili  x   /  AST  13[L]  /  ALT  22  /  AlkPhos  72  12-04    LIVER FUNCTIONS - ( 04 Dec 2024 07:46 )  Alb: 3.4 g/dL / Pro: 7.2 g/dL / ALK PHOS: 72 U/L / ALT: 22 U/L / AST: 13 U/L / GGT: x           Urinalysis Basic - ( 04 Dec 2024 07:46 )    Color: x / Appearance: x / SG: x / pH: x  Gluc: 108 mg/dL / Ketone: x  / Bili: x / Urobili: x   Blood: x / Protein: x / Nitrite: x   Leuk Esterase: x / RBC: x / WBC x   Sq Epi: x / Non Sq Epi: x / Bacteria: x        Microbiology: reviewed    Culture - Urine (collected 12-01-24 @ 03:25)  Source: Clean Catch  Final Report (12-02-24 @ 11:56):    <10,000 CFU/mL Normal Urogenital Monique    Urinalysis with Rflx Culture (collected 12-01-24 @ 03:25)          Radiology: reviewed

## 2024-12-05 NOTE — PROGRESS NOTE ADULT - SUBJECTIVE AND OBJECTIVE BOX
Patient is a 64y old  Female who presents with a chief complaint of back pain (05 Dec 2024 11:51)    Date of servie : 12-05-24 @ 16:29  INTERVAL HPI/OVERNIGHT EVENTS:  T(C): 36.7 (12-05-24 @ 11:01), Max: 36.9 (12-04-24 @ 20:25)  HR: 75 (12-05-24 @ 11:01) (58 - 75)  BP: 130/72 (12-05-24 @ 11:01) (116/62 - 130/72)  RR: 18 (12-05-24 @ 11:01) (18 - 18)  SpO2: 95% (12-05-24 @ 11:01) (95% - 97%)  Wt(kg): --  I&O's Summary      LABS:                        13.2   12.32 )-----------( 313      ( 04 Dec 2024 07:46 )             39.8     12-04    138  |  107  |  21  ----------------------------<  108[H]  4.3   |  27  |  0.67    Ca    9.3      04 Dec 2024 07:46    TPro  7.2  /  Alb  3.4  /  TBili  0.3  /  DBili  x   /  AST  13[L]  /  ALT  22  /  AlkPhos  72  12-04      Urinalysis Basic - ( 04 Dec 2024 07:46 )    Color: x / Appearance: x / SG: x / pH: x  Gluc: 108 mg/dL / Ketone: x  / Bili: x / Urobili: x   Blood: x / Protein: x / Nitrite: x   Leuk Esterase: x / RBC: x / WBC x   Sq Epi: x / Non Sq Epi: x / Bacteria: x      CAPILLARY BLOOD GLUCOSE            Urinalysis Basic - ( 04 Dec 2024 07:46 )    Color: x / Appearance: x / SG: x / pH: x  Gluc: 108 mg/dL / Ketone: x  / Bili: x / Urobili: x   Blood: x / Protein: x / Nitrite: x   Leuk Esterase: x / RBC: x / WBC x   Sq Epi: x / Non Sq Epi: x / Bacteria: x        MEDICATIONS  (STANDING):  acetaminophen     Tablet .. 1000 milliGRAM(s) Oral every 8 hours  cefTRIAXone   IVPB 1000 milliGRAM(s) IV Intermittent once  enoxaparin Injectable 40 milliGRAM(s) SubCutaneous every 24 hours  HYDROmorphone   Tablet 4 milliGRAM(s) Oral every 4 hours  influenza   Vaccine 0.5 milliLiter(s) IntraMuscular once  levothyroxine 100 MICROGram(s) Oral daily  lidocaine   4% Patch 1 Patch Transdermal daily  methylPREDNISolone sodium succinate Injectable 40 milliGRAM(s) IV Push two times a day  polyethylene glycol 3350 17 Gram(s) Oral two times a day    MEDICATIONS  (PRN):  baclofen 5 milliGRAM(s) Oral every 8 hours PRN Muscle Spasm  bisacodyl 5 milliGRAM(s) Oral every 12 hours PRN Constipation  lactulose Syrup 10 Gram(s) Oral every 24 hours PRN constipation  morphine  - Injectable 2 milliGRAM(s) IV Push every 4 hours PRN Severe Pain (7 - 10)  oxyCODONE    IR 5 milliGRAM(s) Oral every 4 hours PRN Moderate Pain (4 - 6)  senna 2 Tablet(s) Oral at bedtime PRN Constipation          PHYSICAL EXAM:  GENERAL: NAD, well-groomed, well-developed  HEAD:  Atraumatic, Normocephalic  CHEST/LUNG: Clear to percussion bilaterally; No rales, rhonchi, wheezing, or rubs  HEART: Regular rate and rhythm; No murmurs, rubs, or gallops  ABDOMEN: Soft, Nontender, Nondistended; Bowel sounds present  EXTREMITIES:  2+ Peripheral Pulses, No clubbing, cyanosis, or edema  LYMPH: No lymphadenopathy noted  SKIN: No rashes or lesions    Care Discussed with Consultants/Other Providers [ ] YES  [ ] NO

## 2024-12-05 NOTE — PROGRESS NOTE ADULT - SUBJECTIVE AND OBJECTIVE BOX
Neurology follow up note    NAIDA [id number]      Interval History:    Patient feels ok no new complaints.    Allergies    penicillin (Hives)    Intolerances        MEDICATIONS    acetaminophen     Tablet .. 1000 milliGRAM(s) Oral every 8 hours  baclofen 5 milliGRAM(s) Oral every 8 hours PRN  bisacodyl 5 milliGRAM(s) Oral every 12 hours PRN  enoxaparin Injectable 40 milliGRAM(s) SubCutaneous every 24 hours  HYDROmorphone   Tablet 4 milliGRAM(s) Oral every 4 hours  influenza   Vaccine 0.5 milliLiter(s) IntraMuscular once  lactulose Syrup 10 Gram(s) Oral every 24 hours PRN  levothyroxine 100 MICROGram(s) Oral daily  lidocaine   4% Patch 1 Patch Transdermal daily  methylPREDNISolone sodium succinate Injectable 40 milliGRAM(s) IV Push two times a day  morphine  - Injectable 2 milliGRAM(s) IV Push every 4 hours PRN  oxyCODONE    IR 5 milliGRAM(s) Oral every 4 hours PRN  polyethylene glycol 3350 17 Gram(s) Oral two times a day  senna 2 Tablet(s) Oral at bedtime PRN              Vital Signs Last 24 Hrs  T(C): 36.6 (05 Dec 2024 05:16), Max: 36.9 (04 Dec 2024 20:25)  T(F): 97.8 (05 Dec 2024 05:16), Max: 98.5 (04 Dec 2024 20:25)  HR: 58 (05 Dec 2024 05:16) (58 - 59)  BP: 128/65 (05 Dec 2024 05:16) (116/62 - 131/66)  BP(mean): --  RR: 18 (05 Dec 2024 05:16) (18 - 18)  SpO2: 97% (05 Dec 2024 05:16) (96% - 97%)    Parameters below as of 05 Dec 2024 05:16  Patient On (Oxygen Delivery Method): room air    REVIEW OF SYSTEMS:  CONSTITUTIONAL:  The patient denies fever, chills, or night sweats.[id number]  HEAD:  No headache.  EYES:  No double vision or blurry vision.  EARS:  No ringing in ears.  NECK:  No neck pain.  CARDIOVASCULAR:  No chest pain.  RESPIRATORY:  No shortness of breath.  ABDOMEN:  No nausea, vomiting, or abdominal pain.  EXTREMITIES/NEUROLOGICAL:  No numbness or tingling.  GENERAL:  Positive back pain that runs down to her left buttocks.  No problems passing urine or stools.    PHYSICAL EXAMINATION:  HEAD:  Normocephalic, atraumatic.  EYES:  No scleral icterus.  EARS:  Hearing bilaterally intact.  NECK:  Supple.  CARDIOVASCULAR:  S1 and S2 heard .  RESPIRATORY:  Air entry bilaterally.  ABDOMEN:  Soft, nontender.  EXTREMITIES:  No clubbing or cyanosis was noted.    NEUROLOGIC:  The patient is awake, alert, and oriented x3.  Extraocular movements are intact.  Speech was fluent.  Smile symmetric.  Motor, bilateral upper 5/5, right lower 4+ out of 5 left 4-/5 proximally secondary to pain.  Sensory, bilaterally intact to light touch.      LABS:  CBC Full  -  ( 04 Dec 2024 07:46 )  WBC Count : 12.32 K/uL  RBC Count : 4.23 M/uL  Hemoglobin : 13.2 g/dL  Hematocrit : 39.8 %  Platelet Count - Automated : 313 K/uL  Mean Cell Volume : 94.1 fl  Mean Cell Hemoglobin : 31.2 pg  Mean Cell Hemoglobin Concentration : 33.2 g/dL  Auto Neutrophil # : x  Auto Lymphocyte # : x  Auto Monocyte # : x  Auto Eosinophil # : x  Auto Basophil # : x  Auto Neutrophil % : x  Auto Lymphocyte % : x  Auto Monocyte % : x  Auto Eosinophil % : x  Auto Basophil % : x    Urinalysis Basic - ( 04 Dec 2024 07:46 )    Color: x / Appearance: x / SG: x / pH: x  Gluc: 108 mg/dL / Ketone: x  / Bili: x / Urobili: x   Blood: x / Protein: x / Nitrite: x   Leuk Esterase: x / RBC: x / WBC x   Sq Epi: x / Non Sq Epi: x / Bacteria: x      12-04    138  |  107  |  21  ----------------------------<  108[H]  4.3   |  27  |  0.67    Ca    9.3      04 Dec 2024 07:46    TPro  7.2  /  Alb  3.4  /  TBili  0.3  /  DBili  x   /  AST  13[L]  /  ALT  22  /  AlkPhos  72  12-04    Hemoglobin A1C:     LIVER FUNCTIONS - ( 04 Dec 2024 07:46 )  Alb: 3.4 g/dL / Pro: 7.2 g/dL / ALK PHOS: 72 U/L / ALT: 22 U/L / AST: 13 U/L / GGT: x           Vitamin B12         RADIOLOGY  CLINICAL INDICATION: Low back pain with left lower extremity radiculopathy    TECHNIQUE: Multiplanar, multisequence MR images of the lumbar spine were   obtained without the administration of intravenous contrast.    COMPARISON: CT lumbar spine 11/30/2024    FINDINGS:    Vertebral body height, marrow signal homogeneity, and facet alignment are   maintained throughout the visualized spinal segments.    Mild disc space narrowing at L2-L3 with Modic type II endplate   degenerative changes.    The conus is normal in size, position, and signal characteristics, ending   at L1.    Descending lumbosacral nerve roots are not nodular or thickened.    T12-L1: No spinal canal stenosis or neural foraminal narrowing.    L1-L2: No spinal canal stenosis or neural foraminal narrowing.    L2-L3: Mild disc bulge. Mild bilateral facet/ligamentous hypertrophy.   Mild thecal sac compression. Mild right neural foraminal narrowing.    L3-L4: Mild disc bulge. Mild bilateral facet/ligamentous hypertrophy.   Mild thecal sac compression. Mild bilateral neural foraminal narrowing.    L4-L5: Mild disc bulge with superimposed left foraminal disc protrusion.   Bilateral facet/ligamentous hypertrophy. Mild thecal sac compression.   Mass effect upon the exiting left L4 nerve root. Mild right neural   foraminal narrowing.    L5-S1: Mild broad-based disc protrusion minimally deforms the ventral   thecal sac. Mild bilateral facet hypertrophy. No neural foraminal   narrowing.    IMPRESSION:  L4-L5 mild disc bulge with superimposed left foraminal disc protrusion   results in mass effect upon the exiting left L4 nerve root.    Other milder degenerative changes.  ANALYSIS AND PLAN:  This is a 64-year-old with lower back pain.    1.For lower back pain, questionable could this be secondary to lumbar radiculopathy being exacerbated versus possibly a type of renal stone.  2.fall precautions   3.We will recommend pain management.  4.For history of hypothyroidism, continue Synthroid.  5.We will try Lidoderm patch.  6.MRI imaging, noted Ortho thanks   7 less pain today 12/4  .50 minutes of time spent with the patient, plan of care, reviewing data, speaking to multidisciplinary health care team with greater than 50% of time counseling and care coordination.  PATIENT HAS NOT YET BEEN SEEN AND EXAMINED TODAY. NOTE AND CHART REVIEWED IN AM AND EXAM FORM PREVIOUS.  ONCE PATIENT SEEN, CHART WILL BE UPDATE AT PRESENT NOTE IS INCOMPLETE     Neurology follow up note    NAIDA [id number]      Interval History:    Patient feels ok no new complaints.    Allergies    penicillin (Hives)    Intolerances        MEDICATIONS    acetaminophen     Tablet .. 1000 milliGRAM(s) Oral every 8 hours  baclofen 5 milliGRAM(s) Oral every 8 hours PRN  bisacodyl 5 milliGRAM(s) Oral every 12 hours PRN  enoxaparin Injectable 40 milliGRAM(s) SubCutaneous every 24 hours  HYDROmorphone   Tablet 4 milliGRAM(s) Oral every 4 hours  influenza   Vaccine 0.5 milliLiter(s) IntraMuscular once  lactulose Syrup 10 Gram(s) Oral every 24 hours PRN  levothyroxine 100 MICROGram(s) Oral daily  lidocaine   4% Patch 1 Patch Transdermal daily  methylPREDNISolone sodium succinate Injectable 40 milliGRAM(s) IV Push two times a day  morphine  - Injectable 2 milliGRAM(s) IV Push every 4 hours PRN  oxyCODONE    IR 5 milliGRAM(s) Oral every 4 hours PRN  polyethylene glycol 3350 17 Gram(s) Oral two times a day  senna 2 Tablet(s) Oral at bedtime PRN              Vital Signs Last 24 Hrs  T(C): 36.6 (05 Dec 2024 05:16), Max: 36.9 (04 Dec 2024 20:25)  T(F): 97.8 (05 Dec 2024 05:16), Max: 98.5 (04 Dec 2024 20:25)  HR: 58 (05 Dec 2024 05:16) (58 - 59)  BP: 128/65 (05 Dec 2024 05:16) (116/62 - 131/66)  BP(mean): --  RR: 18 (05 Dec 2024 05:16) (18 - 18)  SpO2: 97% (05 Dec 2024 05:16) (96% - 97%)    Parameters below as of 05 Dec 2024 05:16  Patient On (Oxygen Delivery Method): room air    REVIEW OF SYSTEMS:  CONSTITUTIONAL:  The patient denies fever, chills, or night sweats.[id number]  HEAD:  No headache.  EYES:  No double vision or blurry vision.  EARS:  No ringing in ears.  NECK:  No neck pain.  CARDIOVASCULAR:  No chest pain.  RESPIRATORY:  No shortness of breath.  ABDOMEN:  No nausea, vomiting, or abdominal pain.  EXTREMITIES/NEUROLOGICAL:  No numbness or tingling.  GENERAL:  Positive back pain that runs down to her left buttocks.  No problems passing urine or stools.    PHYSICAL EXAMINATION:  HEAD:  Normocephalic, atraumatic.  EYES:  No scleral icterus.  EARS:  Hearing bilaterally intact.  NECK:  Supple.  CARDIOVASCULAR:  S1 and S2 heard .  RESPIRATORY:  Air entry bilaterally.  ABDOMEN:  Soft, nontender.  EXTREMITIES:  No clubbing or cyanosis was noted.    NEUROLOGIC:  The patient is awake, alert, and oriented x3.  Extraocular movements are intact.  Speech was fluent.  Smile symmetric.  Motor, bilateral upper 5/5, right lower 4+ out of 5 left 4-/5 proximally secondary to pain.  Sensory, bilaterally intact to light touch.      LABS:  CBC Full  -  ( 04 Dec 2024 07:46 )  WBC Count : 12.32 K/uL  RBC Count : 4.23 M/uL  Hemoglobin : 13.2 g/dL  Hematocrit : 39.8 %  Platelet Count - Automated : 313 K/uL  Mean Cell Volume : 94.1 fl  Mean Cell Hemoglobin : 31.2 pg  Mean Cell Hemoglobin Concentration : 33.2 g/dL  Auto Neutrophil # : x  Auto Lymphocyte # : x  Auto Monocyte # : x  Auto Eosinophil # : x  Auto Basophil # : x  Auto Neutrophil % : x  Auto Lymphocyte % : x  Auto Monocyte % : x  Auto Eosinophil % : x  Auto Basophil % : x    Urinalysis Basic - ( 04 Dec 2024 07:46 )    Color: x / Appearance: x / SG: x / pH: x  Gluc: 108 mg/dL / Ketone: x  / Bili: x / Urobili: x   Blood: x / Protein: x / Nitrite: x   Leuk Esterase: x / RBC: x / WBC x   Sq Epi: x / Non Sq Epi: x / Bacteria: x      12-04    138  |  107  |  21  ----------------------------<  108[H]  4.3   |  27  |  0.67    Ca    9.3      04 Dec 2024 07:46    TPro  7.2  /  Alb  3.4  /  TBili  0.3  /  DBili  x   /  AST  13[L]  /  ALT  22  /  AlkPhos  72  12-04    Hemoglobin A1C:     LIVER FUNCTIONS - ( 04 Dec 2024 07:46 )  Alb: 3.4 g/dL / Pro: 7.2 g/dL / ALK PHOS: 72 U/L / ALT: 22 U/L / AST: 13 U/L / GGT: x           Vitamin B12         RADIOLOGY  CLINICAL INDICATION: Low back pain with left lower extremity radiculopathy    TECHNIQUE: Multiplanar, multisequence MR images of the lumbar spine were   obtained without the administration of intravenous contrast.    COMPARISON: CT lumbar spine 11/30/2024    FINDINGS:    Vertebral body height, marrow signal homogeneity, and facet alignment are   maintained throughout the visualized spinal segments.    Mild disc space narrowing at L2-L3 with Modic type II endplate   degenerative changes.    The conus is normal in size, position, and signal characteristics, ending   at L1.    Descending lumbosacral nerve roots are not nodular or thickened.    T12-L1: No spinal canal stenosis or neural foraminal narrowing.    L1-L2: No spinal canal stenosis or neural foraminal narrowing.    L2-L3: Mild disc bulge. Mild bilateral facet/ligamentous hypertrophy.   Mild thecal sac compression. Mild right neural foraminal narrowing.    L3-L4: Mild disc bulge. Mild bilateral facet/ligamentous hypertrophy.   Mild thecal sac compression. Mild bilateral neural foraminal narrowing.    L4-L5: Mild disc bulge with superimposed left foraminal disc protrusion.   Bilateral facet/ligamentous hypertrophy. Mild thecal sac compression.   Mass effect upon the exiting left L4 nerve root. Mild right neural   foraminal narrowing.    L5-S1: Mild broad-based disc protrusion minimally deforms the ventral   thecal sac. Mild bilateral facet hypertrophy. No neural foraminal   narrowing.    IMPRESSION:  L4-L5 mild disc bulge with superimposed left foraminal disc protrusion   results in mass effect upon the exiting left L4 nerve root.    Other milder degenerative changes.  ANALYSIS AND PLAN:  This is a 64-year-old with lower back pain.    1.For lower back pain, questionable could this be secondary to lumbar radiculopathy being exacerbated versus possibly a type of renal stone.  2.fall precautions   3.We will recommend pain management.  4.For history of hypothyroidism, continue Synthroid.  5.We will try Lidoderm patch.  6.MRI imaging, noted Ortho thanks   7 less pain today 12/5  8 able to walk more today   .50 minutes of time spent with the patient, plan of care, reviewing data, speaking to multidisciplinary health care team with greater than 50% of time counseling and care coordination.

## 2024-12-05 NOTE — PROGRESS NOTE ADULT - SUBJECTIVE AND OBJECTIVE BOX
Chief Complaint: Back pain    Interval Events: No events overnight.    Review of Systems:  General: No fevers, chills, weight gain  Skin: No rashes, color changes  Cardiovascular: No chest pain, orthopnea  Respiratory: No shortness of breath, cough  Gastrointestinal: No nausea, abdominal pain  Genitourinary: No incontinence, pain with urination  Musculoskeletal: No pain, swelling, decreased range of motion  Neurological: No headache, weakness  Psychiatric: No depression, anxiety  Endocrine: No weight gain, increased thirst  All other systems are comprehensively negative.    Physical Exam:  Vital Signs Last 24 Hrs  T(C): 36.6 (05 Dec 2024 05:16), Max: 36.9 (04 Dec 2024 20:25)  T(F): 97.8 (05 Dec 2024 05:16), Max: 98.5 (04 Dec 2024 20:25)  HR: 58 (05 Dec 2024 05:16) (58 - 59)  BP: 128/65 (05 Dec 2024 05:16) (116/62 - 131/66)  BP(mean): --  RR: 18 (05 Dec 2024 05:16) (18 - 18)  SpO2: 97% (05 Dec 2024 05:16) (96% - 97%)  Parameters below as of 05 Dec 2024 05:16  Patient On (Oxygen Delivery Method): room air  General: NAD  HEENT: MMM  Neck: No JVD, no carotid bruit  Lungs: CTAB  CV: RRR, nl S1/S2, no M/R/G  Abdomen: S/NT/ND, +BS  Extremities: No LE edema, no cyanosis  Neuro: AAOx3, non-focal  Skin: No rash    Labs:             12-04    138  |  107  |  21  ----------------------------<  108[H]  4.3   |  27  |  0.67    Ca    9.3      04 Dec 2024 07:46    TPro  7.2  /  Alb  3.4  /  TBili  0.3  /  DBili  x   /  AST  13[L]  /  ALT  22  /  AlkPhos  72  12-04                        13.2   12.32 )-----------( 313      ( 04 Dec 2024 07:46 )             39.8

## 2024-12-06 ENCOUNTER — TRANSCRIPTION ENCOUNTER (OUTPATIENT)
Age: 64
End: 2024-12-06

## 2024-12-06 VITALS
SYSTOLIC BLOOD PRESSURE: 135 MMHG | RESPIRATION RATE: 18 BRPM | OXYGEN SATURATION: 98 % | TEMPERATURE: 98 F | HEART RATE: 59 BPM | DIASTOLIC BLOOD PRESSURE: 75 MMHG

## 2024-12-06 LAB
ALBUMIN SERPL ELPH-MCNC: 3.4 G/DL — SIGNIFICANT CHANGE UP (ref 3.3–5)
ALP SERPL-CCNC: 66 U/L — SIGNIFICANT CHANGE UP (ref 40–120)
ALT FLD-CCNC: 27 U/L — SIGNIFICANT CHANGE UP (ref 12–78)
ANION GAP SERPL CALC-SCNC: 7 MMOL/L — SIGNIFICANT CHANGE UP (ref 5–17)
AST SERPL-CCNC: 14 U/L — LOW (ref 15–37)
BILIRUB SERPL-MCNC: 0.4 MG/DL — SIGNIFICANT CHANGE UP (ref 0.2–1.2)
BUN SERPL-MCNC: 19 MG/DL — SIGNIFICANT CHANGE UP (ref 7–23)
CALCIUM SERPL-MCNC: 8.8 MG/DL — SIGNIFICANT CHANGE UP (ref 8.5–10.1)
CHLORIDE SERPL-SCNC: 106 MMOL/L — SIGNIFICANT CHANGE UP (ref 96–108)
CO2 SERPL-SCNC: 27 MMOL/L — SIGNIFICANT CHANGE UP (ref 22–31)
CREAT SERPL-MCNC: 0.61 MG/DL — SIGNIFICANT CHANGE UP (ref 0.5–1.3)
EGFR: 101 ML/MIN/1.73M2 — SIGNIFICANT CHANGE UP
GLUCOSE SERPL-MCNC: 94 MG/DL — SIGNIFICANT CHANGE UP (ref 70–99)
HCT VFR BLD CALC: 41 % — SIGNIFICANT CHANGE UP (ref 34.5–45)
HGB BLD-MCNC: 13.6 G/DL — SIGNIFICANT CHANGE UP (ref 11.5–15.5)
MCHC RBC-ENTMCNC: 31.2 PG — SIGNIFICANT CHANGE UP (ref 27–34)
MCHC RBC-ENTMCNC: 33.2 G/DL — SIGNIFICANT CHANGE UP (ref 32–36)
MCV RBC AUTO: 94 FL — SIGNIFICANT CHANGE UP (ref 80–100)
NRBC # BLD: 0 /100 WBCS — SIGNIFICANT CHANGE UP (ref 0–0)
PLATELET # BLD AUTO: 279 K/UL — SIGNIFICANT CHANGE UP (ref 150–400)
POTASSIUM SERPL-MCNC: 4.4 MMOL/L — SIGNIFICANT CHANGE UP (ref 3.5–5.3)
POTASSIUM SERPL-SCNC: 4.4 MMOL/L — SIGNIFICANT CHANGE UP (ref 3.5–5.3)
PROT SERPL-MCNC: 7.1 G/DL — SIGNIFICANT CHANGE UP (ref 6–8.3)
RBC # BLD: 4.36 M/UL — SIGNIFICANT CHANGE UP (ref 3.8–5.2)
RBC # FLD: 12.3 % — SIGNIFICANT CHANGE UP (ref 10.3–14.5)
SODIUM SERPL-SCNC: 140 MMOL/L — SIGNIFICANT CHANGE UP (ref 135–145)
WBC # BLD: 12.64 K/UL — HIGH (ref 3.8–10.5)
WBC # FLD AUTO: 12.64 K/UL — HIGH (ref 3.8–10.5)

## 2024-12-06 PROCEDURE — 96374 THER/PROPH/DIAG INJ IV PUSH: CPT

## 2024-12-06 PROCEDURE — 93306 TTE W/DOPPLER COMPLETE: CPT

## 2024-12-06 PROCEDURE — 97162 PT EVAL MOD COMPLEX 30 MIN: CPT

## 2024-12-06 PROCEDURE — 99285 EMERGENCY DEPT VISIT HI MDM: CPT

## 2024-12-06 PROCEDURE — 85025 COMPLETE CBC W/AUTO DIFF WBC: CPT

## 2024-12-06 PROCEDURE — 96375 TX/PRO/DX INJ NEW DRUG ADDON: CPT

## 2024-12-06 PROCEDURE — 72148 MRI LUMBAR SPINE W/O DYE: CPT | Mod: MC

## 2024-12-06 PROCEDURE — 36415 COLL VENOUS BLD VENIPUNCTURE: CPT

## 2024-12-06 PROCEDURE — 81001 URINALYSIS AUTO W/SCOPE: CPT

## 2024-12-06 PROCEDURE — 85027 COMPLETE CBC AUTOMATED: CPT

## 2024-12-06 PROCEDURE — 80053 COMPREHEN METABOLIC PANEL: CPT

## 2024-12-06 PROCEDURE — 97116 GAIT TRAINING THERAPY: CPT

## 2024-12-06 PROCEDURE — 97530 THERAPEUTIC ACTIVITIES: CPT

## 2024-12-06 PROCEDURE — 83690 ASSAY OF LIPASE: CPT

## 2024-12-06 PROCEDURE — 93005 ELECTROCARDIOGRAM TRACING: CPT

## 2024-12-06 PROCEDURE — 74176 CT ABD & PELVIS W/O CONTRAST: CPT | Mod: MC

## 2024-12-06 PROCEDURE — 87086 URINE CULTURE/COLONY COUNT: CPT

## 2024-12-06 PROCEDURE — 72131 CT LUMBAR SPINE W/O DYE: CPT | Mod: MC

## 2024-12-06 RX ORDER — OXYCODONE HYDROCHLORIDE 30 MG/1
1 TABLET ORAL
Qty: 9 | Refills: 0
Start: 2024-12-06 | End: 2024-12-08

## 2024-12-06 RX ADMIN — Medication 100 MICROGRAM(S): at 05:21

## 2024-12-06 RX ADMIN — ENOXAPARIN SODIUM 40 MILLIGRAM(S): 30 INJECTION SUBCUTANEOUS at 10:21

## 2024-12-06 RX ADMIN — HYDROMORPHONE HYDROCHLORIDE 4 MILLIGRAM(S): 2 TABLET ORAL at 06:20

## 2024-12-06 RX ADMIN — ACETAMINOPHEN 500MG 1000 MILLIGRAM(S): 500 TABLET, COATED ORAL at 05:20

## 2024-12-06 RX ADMIN — PREDNISONE 20 MILLIGRAM(S): 20 TABLET ORAL at 05:21

## 2024-12-06 RX ADMIN — HYDROMORPHONE HYDROCHLORIDE 4 MILLIGRAM(S): 2 TABLET ORAL at 01:47

## 2024-12-06 RX ADMIN — HYDROMORPHONE HYDROCHLORIDE 4 MILLIGRAM(S): 2 TABLET ORAL at 10:21

## 2024-12-06 RX ADMIN — ACETAMINOPHEN 500MG 1000 MILLIGRAM(S): 500 TABLET, COATED ORAL at 06:20

## 2024-12-06 RX ADMIN — HYDROMORPHONE HYDROCHLORIDE 4 MILLIGRAM(S): 2 TABLET ORAL at 05:20

## 2024-12-06 RX ADMIN — HYDROMORPHONE HYDROCHLORIDE 4 MILLIGRAM(S): 2 TABLET ORAL at 02:47

## 2024-12-06 NOTE — PROGRESS NOTE ADULT - ASSESSMENT
63y/o female presents to ED with Left lower back pain. Pt reports pain shoots into leg. Began Wednesday and has been worsening since. Reports significant difficulty with ambulating and pain worse with movement. Denies trauma, fever, abdominal pain, nausea, emesis, dysuria.    Back pain  - pain control  - steroids  - neuro fu   - MRI reviewed   - orthospine and pain management fu     Hypothyroid  - cw synthroid    UTI  - cw abx  - fu cultures     Nonobstructive real stone  - outpt fu   - cw abx     DVT prophylaxis   
The patient is a 64 year old female with a history of hypothyroidism, enlarged aorta, lower back pain who presents with back pain.    Plan:  - ECG with sinus rhythm and anterior TWI  - CT renal stone with finding of subendocardial fatty deposition in the left ventricular apex  - While this finding is often a normal finding in most patients, cannot exclude other issues such as ARVD  - Echo with normal LV systolic function, no significant valve issues  - Suggest outpatient cardiac MRI. She will follow-up with her cardiologist for this.  - Enlarged aorta - most recent CT chest with 3.5 cm thoracic aorta which is borderline and not significantly dilated  - On IV antibiotics for UTI and kidney stone  - May need ortho eval
The patient is a 64 year old female with a history of hypothyroidism, enlarged aorta, lower back pain who presents with back pain.    Plan:  - ECG with sinus rhythm and anterior TWI  - CT renal stone with finding of subendocardial fatty deposition in the left ventricular apex  - While this finding is often a normal finding in most patients, cannot exclude other issues such as ARVD  - Echo with normal LV systolic function, no significant valve issues  - Suggest outpatient cardiac MRI. She will follow-up with her cardiologist for this.  - Enlarged aorta - most recent CT chest with 3.5 cm thoracic aorta which is borderline and not significantly dilated  - On IV antibiotics for UTI and kidney stone  - Ortho follow-up
63y/o female presents to ED with Left lower back pain. Pt reports pain shoots into leg. Began Wednesday and has been worsening since. Reports significant difficulty with ambulating and pain worse with movement. Denies trauma, fever, abdominal pain, nausea, emesis, dysuria.    Back pain  - pain control as per pain management   - still with severe pain  this morning   - changed steroids to PO  - cw baclofen  - cw lidocaine patch   - neuro fu   - ortho spine and pain management fu appreciated     Hypothyroid  - cw synthroid    UTI  - cw abx , last day today    Nonobstructive renal stone  - outpt fu   - cw abx for uti     DVT prophylaxis       dc planning in am if pain is controlled   
65y/o female presents to ED with Left lower back pain.   ID c/s for UTI    Acute Cystitis  Afebrile, Leukocytosis to 14 on admission  UA mildly positive, 15-20 WBCs  UCx NGTD  CT Renal w/ nonobstructive stone, no other acute pathology  S/p ceftriaxone x5 day course, completed    Back Pain  MRI L4-L5 mild disc bulge with superimposed left foraminal disc protrusion results in mass effect upon the exiting left L4 nerve root.    Penicillin Allergy  noted--hives only. No hx of anaphylaxis    Recommendations:   Monitor off Abx  Trend temps/WBC  Pain control, supportive measures  Additional care per primary team    Stable from ID standpoint  D/c planning per primary team when medically ready    Please call with any questions.  Patricia Brunson M.D.  [facility]ious [facility] [phone]  For after 5 P.M. and weekends, please call [phone]  Available on [employer] TEAMS  
65y/o female presents to ED with Left lower back pain.   ID c/s for UTI    Acute Cystitis  Back Pain  Afebrile, Leukocytosis to 14 on admission  UA mildly positive, 15-20 WBCs  UCx NGTD  CT Renal w/ nonobstructive stone, no other acute pathology  MRI L4-L5 mild disc bulge with superimposed left foraminal disc protrusion results in mass effect upon the exiting left L4 nerve root.    Penicillin Allergy  noted--hives only. No hx of anaphylaxis    Recommendations:   C/w ceftriaxone x5 day course, last day today  Trend temps/WBC  Pain control, supportive measures  Additional care per primary team    Infectious Diseases will follow. Please call with any questions.  Patricia Brunson M.D.  [facility] [phone]  For after 5 P.M. and weekends, please call [phone]  Available on [employer] TEAMS  
The patient is a 64 year old female with a history of hypothyroidism, enlarged aorta, lower back pain who presents with back pain.    Plan:  - ECG with sinus rhythm and anterior TWI  - CT renal stone with finding of subendocardial fatty deposition in the left ventricular apex  - Unclear significance of this finding; this was not documented on prior CTs  - Echo pending  - If echo unremarkable, suggest outpatient cardiac MRI. She will follow-up with her cardiologist for this.  - Enlarged aorta - most recent CT chest with 3.5 cm thoracic aorta which is borderline and not significantly dilated  - On IV antibiotics for UTI and kidney stone  - May need ortho eval
63y/o female presents to ED with Left lower back pain. Pt reports pain shoots into leg. Began Wednesday and has been worsening since. Reports significant difficulty with ambulating and pain worse with movement. Denies trauma, fever, abdominal pain, nausea, emesis, dysuria.    Back pain  - pain control  - steroids  - neuro fu   -  MRI reviewed   - will monitor     Hypothyroid  - cw synthroid    UTI  - CW ABX  - FU CULTURES  - id fu     Nonobstructive real stone  - outpt fu   - cw abx     DVT prophylaxis       
63y/o female presents to ED with Left lower back pain. Pt reports pain shoots into leg. Began Wednesday and has been worsening since. Reports significant difficulty with ambulating and pain worse with movement. Denies trauma, fever, abdominal pain, nausea, emesis, dysuria.    Back pain  - pain control  - steroids  - neuro fu   - MRI reviewed   - orthospine and pain management fu     Hypothyroid  - cw synthroid    UTI  - cw abx     Nonobstructive renal stone  - outpt fu   - cw abx for uti     DVT prophylaxis       dc planning in am 
The patient is a 64 year old female with a history of hypothyroidism, enlarged aorta, lower back pain who presents with back pain.    Plan:  - ECG with sinus rhythm and anterior TWI  - CT renal stone with finding of subendocardial fatty deposition in the left ventricular apex  - While this finding is often a normal finding in most patients, cannot exclude other issues such as ARVD  - Echo with normal LV systolic function, no significant valve issues  - Suggest outpatient cardiac MRI. She will follow-up with her cardiologist for this.  - Enlarged aorta - most recent CT chest with 3.5 cm thoracic aorta which is borderline and not significantly dilated  - Completed IV antibiotics for UTI and kidney stone  - Discharge planning
The patient is a 64 year old female with a history of hypothyroidism, enlarged aorta, lower back pain who presents with back pain.    Plan:  - ECG with sinus rhythm and anterior TWI  - CT renal stone with finding of subendocardial fatty deposition in the left ventricular apex  - While this finding is often a normal finding in most patients, cannot exclude other issues such as ARVD  - Echo with normal LV systolic function, no significant valve issues  - Suggest outpatient cardiac MRI. She will follow-up with her cardiologist for this.  - Enlarged aorta - most recent CT chest with 3.5 cm thoracic aorta which is borderline and not significantly dilated  - On IV antibiotics for UTI and kidney stone  - Discharge planning
63y/o female presents to ED with Left lower back pain.   ID c/s for UTI    Acute Cystitis  Back Pain  Afebrile, Leukocytosis to 14 on admission  UA mildly positive, 15-20 WBCs  UCx NGTD  CT Renal w/ nonobstructive stone, no other acute pathology  MRI L4-L5 mild disc bulge with superimposed left foraminal disc protrusion results in mass effect upon the exiting left L4 nerve root.    Penicillin Allergy  noted--hives only. No hx of anaphylaxis    Recommendations:   C/w ceftriaxone x5 day course, last day 12/5  Trend temps/WBC  Pain control, supportive measures  Additional care per primary team    Infectious Diseases will follow. Please call with any questions.  Patricia Brunson M.D.  [facility] [phone]  For after 5 P.M. and weekends, please call [phone]  Available on [employer] TEAMS  
63y/o female presents to ED with Left lower back pain.   ID c/s for UTI    Acute Cystitis  Back Pain  Afebrile, Leukocytosis to 14 on admission  UA mildly positive, 15-20 WBCs  UCx NGTD  CT Renal w/ nonobstructive stone, no other acute pathology  MRI L4-L5 mild disc bulge with superimposed left foraminal disc protrusion results in mass effect upon the exiting left L4 nerve root.    Penicillin Allergy  noted--hives only. No hx of anaphylaxis    Recommendations:   C/w ceftriaxone x5 day course, switch to PO cefpodoxime 200mg BID to complete course if pending d/c  Trend temps/WBC  Pain control, supportive measures  Additional care per primary team    Infectious Diseases will follow. Please call with any questions.  Patricia Brunson M.D.  [facility] Division of Infectious Diseases [phone]  For after 5 P.M. and weekends, please call [phone]  Available on [employer] TEAMS  
65y/o female presents to ED with Left lower back pain.   ID c/s for UTI    Acute Cystitis  Back Pain  Afebrile, Leukocytosis to 14 on admission  UA mildly positive, 15-20 WBCs; UCx NGTD  CT Renal w/ nonobstructive stone, no other acute pathology  MRI L4-L5 mild disc bulge with superimposed left foraminal disc protrusion results in mass effect upon the exiting left L4 nerve root.    Penicillin Allergy  noted--hives only. No hx of anaphylaxis    Recommendations:   C/w ceftriaxone x5 day course w/ switch to PO cefpodoxime 200mg BID to complete course when ready for d/c  Trend temps/WBC  Pain control, supportive measures  Additional care per primary team    Infectious Diseases will follow. Please call with any questions.  Patricia Brunson M.D.  [facility] Division of Infectious Diseases [phone]  For after 5 P.M. and weekends, please call [phone]  Available on [employer] TEAMS

## 2024-12-06 NOTE — PROGRESS NOTE ADULT - SUBJECTIVE AND OBJECTIVE BOX
Chief Complaint: Back pain    Interval Events: No events overnight.    Review of Systems:  General: No fevers, chills, weight gain  Skin: No rashes, color changes  Cardiovascular: No chest pain, orthopnea  Respiratory: No shortness of breath, cough  Gastrointestinal: No nausea, abdominal pain  Genitourinary: No incontinence, pain with urination  Musculoskeletal: No pain, swelling, decreased range of motion  Neurological: No headache, weakness  Psychiatric: No depression, anxiety  Endocrine: No weight gain, increased thirst  All other systems are comprehensively negative.    Physical Exam:  Vital Signs Last 24 Hrs  T(C): 36.7 (06 Dec 2024 05:11), Max: 36.7 (05 Dec 2024 11:01)  T(F): 98 (06 Dec 2024 05:11), Max: 98 (05 Dec 2024 11:01)  HR: 57 (06 Dec 2024 05:11) (57 - 75)  BP: 121/72 (06 Dec 2024 05:11) (109/64 - 130/72)  BP(mean): --  RR: 18 (06 Dec 2024 05:11) (18 - 18)  SpO2: 97% (06 Dec 2024 05:11) (95% - 97%)  Parameters below as of 06 Dec 2024 05:11  Patient On (Oxygen Delivery Method): room air  General: NAD  HEENT: MMM  Neck: No JVD, no carotid bruit  Lungs: CTAB  CV: RRR, nl S1/S2, no M/R/G  Abdomen: S/NT/ND, +BS  Extremities: No LE edema, no cyanosis  Neuro: AAOx3, non-focal  Skin: No rash    Labs:

## 2024-12-06 NOTE — PROGRESS NOTE ADULT - SUBJECTIVE AND OBJECTIVE BOX
Neurology follow up note    NAIDA [id number]      Interval History:    Patient feels ok no new complaints walking in room     Allergies    penicillin (Hives)    Intolerances        MEDICATIONS    acetaminophen     Tablet .. 1000 milliGRAM(s) Oral every 8 hours  baclofen 5 milliGRAM(s) Oral every 8 hours PRN  bisacodyl 5 milliGRAM(s) Oral every 12 hours PRN  enoxaparin Injectable 40 milliGRAM(s) SubCutaneous every 24 hours  HYDROmorphone   Tablet 4 milliGRAM(s) Oral every 4 hours  influenza   Vaccine 0.5 milliLiter(s) IntraMuscular once  lactulose Syrup 10 Gram(s) Oral every 24 hours PRN  levothyroxine 100 MICROGram(s) Oral daily  lidocaine   4% Patch 1 Patch Transdermal daily  morphine  - Injectable 2 milliGRAM(s) IV Push every 4 hours PRN  oxyCODONE    IR 5 milliGRAM(s) Oral every 4 hours PRN  polyethylene glycol 3350 17 Gram(s) Oral two times a day  predniSONE   Tablet 20 milliGRAM(s) Oral daily  senna 2 Tablet(s) Oral at bedtime PRN              Vital Signs Last 24 Hrs  T(C): 36.7 (06 Dec 2024 05:11), Max: 36.7 (05 Dec 2024 11:01)  T(F): 98 (06 Dec 2024 05:11), Max: 98 (05 Dec 2024 11:01)  HR: 57 (06 Dec 2024 05:11) (57 - 75)  BP: 121/72 (06 Dec 2024 05:11) (109/64 - 130/72)  BP(mean): --  RR: 18 (06 Dec 2024 05:11) (18 - 18)  SpO2: 97% (06 Dec 2024 05:11) (95% - 97%)    Parameters below as of 06 Dec 2024 05:11  Patient On (Oxygen Delivery Method): room air        REVIEW OF SYSTEMS:  CONSTITUTIONAL:  The patient denies fever, chills, or night sweats.[id number]  HEAD:  No headache.  EYES:  No double vision or blurry vision.  EARS:  No ringing in ears.  NECK:  No neck pain.  CARDIOVASCULAR:  No chest pain.  RESPIRATORY:  No shortness of breath.  ABDOMEN:  No nausea, vomiting, or abdominal pain.  EXTREMITIES/NEUROLOGICAL:  No numbness or tingling.  GENERAL:  Positive back pain that runs down to her left buttocks.  No problems passing urine or stools.    PHYSICAL EXAMINATION:  HEAD:  Normocephalic, atraumatic.  EYES:  No scleral icterus.  EARS:  Hearing bilaterally intact.  NECK:  Supple.  CARDIOVASCULAR:  S1 and S2 heard .  RESPIRATORY:  Air entry bilaterally.  ABDOMEN:  Soft, nontender.  EXTREMITIES:  No clubbing or cyanosis was noted.    NEUROLOGIC:  The patient is awake, alert, and oriented x3.  Extraocular movements are intact.  Speech was fluent.  Smile symmetric.  Motor, bilateral upper 5/5, right lower 4+ out of 5 left 4-/5 proximally secondary to pain.  Sensory, bilaterally intact to light touch.      LABS:            Hemoglobin A1C:       Vitamin B12         RADIOLOGY  CLINICAL INDICATION: Low back pain with left lower extremity radiculopathy    TECHNIQUE: Multiplanar, multisequence MR images of the lumbar spine were   obtained without the administration of intravenous contrast.    COMPARISON: CT lumbar spine 11/30/2024    FINDINGS:    Vertebral body height, marrow signal homogeneity, and facet alignment are   maintained throughout the visualized spinal segments.    Mild disc space narrowing at L2-L3 with Modic type II endplate   degenerative changes.    The conus is normal in size, position, and signal characteristics, ending   at L1.    Descending lumbosacral nerve roots are not nodular or thickened.    T12-L1: No spinal canal stenosis or neural foraminal narrowing.    L1-L2: No spinal canal stenosis or neural foraminal narrowing.    L2-L3: Mild disc bulge. Mild bilateral facet/ligamentous hypertrophy.   Mild thecal sac compression. Mild right neural foraminal narrowing.    L3-L4: Mild disc bulge. Mild bilateral facet/ligamentous hypertrophy.   Mild thecal sac compression. Mild bilateral neural foraminal narrowing.    L4-L5: Mild disc bulge with superimposed left foraminal disc protrusion.   Bilateral facet/ligamentous hypertrophy. Mild thecal sac compression.   Mass effect upon the exiting left L4 nerve root. Mild right neural   foraminal narrowing.    L5-S1: Mild broad-based disc protrusion minimally deforms the ventral   thecal sac. Mild bilateral facet hypertrophy. No neural foraminal   narrowing.    IMPRESSION:  L4-L5 mild disc bulge with superimposed left foraminal disc protrusion   results in mass effect upon the exiting left L4 nerve root.    Other milder degenerative changes.  ANALYSIS AND PLAN:  This is a 64-year-old with lower back pain.    1.For lower back pain, questionable could this be secondary to lumbar radiculopathy being exacerbated versus possibly a type of renal stone.  2.fall precautions   3.We will recommend pain management.  4.For history of hypothyroidism, continue Synthroid.  5.We will try Lidoderm patch.  6.MRI imaging, noted Ortho thanks   7 less pain today 12/6  8 able to walk more today   47 minutes of time spent with the patient, plan of care, reviewing data, speaking to multidisciplinary health care team with greater than 50% of time counseling and care coordination.

## 2024-12-06 NOTE — DISCHARGE NOTE NURSING/CASE MANAGEMENT/SOCIAL WORK - FINANCIAL ASSISTANCE
[facility] provides services at a reduced cost to those who are determined to be eligible through [facility]’s financial assistance program. Information regarding [facility]’s financial assistance program can be found by going to [email or web address] or by calling [phone].

## 2024-12-06 NOTE — PROGRESS NOTE ADULT - PROVIDER SPECIALTY LIST ADULT
Cardiology
Neurology
Cardiology
Cardiology
Hospitalist
Hospitalist
Infectious Disease
Cardiology
Cardiology
Hospitalist
Infectious Disease
Neurology
Neurology
Hospitalist
Infectious Disease

## 2024-12-06 NOTE — DISCHARGE NOTE PROVIDER - CARE PROVIDER_API CALL
Isela Calvo  [address]   [address] [place] [address]  Phone: [phone]  Fax: (   )    -  Follow Up Time:

## 2024-12-06 NOTE — DISCHARGE NOTE PROVIDER - NSDCMRMEDTOKEN_GEN_ALL_CORE_FT
levothyroxine 100 mcg (0.1 mg) oral tablet: 1 tab(s) orally once a day AM  oxyCODONE 5 mg oral tablet: 1 tab(s) orally every 8 hours as needed for  severe pain MDD: 15  predniSONE 20 mg oral tablet: 1 tab(s) orally once a day

## 2024-12-06 NOTE — PROGRESS NOTE ADULT - SUBJECTIVE AND OBJECTIVE BOX
\[facility]\"", Division of Infectious Diseases  MAEVE Garcia Y. Patel, S. Shah, [facility]  [phone]    Name: NAIDA FAM  Age: 64y  Gender: Female  MRN: [id number]    Interval History:  No acute overnight events.   Notes reviewed    Antibiotics:      Medications:  acetaminophen     Tablet .. 1000 milliGRAM(s) Oral every 8 hours  baclofen 5 milliGRAM(s) Oral every 8 hours PRN  bisacodyl 5 milliGRAM(s) Oral every 12 hours PRN  enoxaparin Injectable 40 milliGRAM(s) SubCutaneous every 24 hours  HYDROmorphone   Tablet 4 milliGRAM(s) Oral every 4 hours  influenza   Vaccine 0.5 milliLiter(s) IntraMuscular once  lactulose Syrup 10 Gram(s) Oral every 24 hours PRN  levothyroxine 100 MICROGram(s) Oral daily  lidocaine   4% Patch 1 Patch Transdermal daily  morphine  - Injectable 2 milliGRAM(s) IV Push every 4 hours PRN  oxyCODONE    IR 5 milliGRAM(s) Oral every 4 hours PRN  polyethylene glycol 3350 17 Gram(s) Oral two times a day  predniSONE   Tablet 20 milliGRAM(s) Oral daily  senna 2 Tablet(s) Oral at bedtime PRN      Review of Systems:  A 10-point review of systems was obtained.   Review of systems otherwise negative except as previously noted.    Allergies: penicillin (Hives)    For details regarding the patient's past medical history, social history, family history, and other miscellaneous elements, please refer the initial infectious diseases consultation and/or the admitting history and physical examination for this admission.    Objective:  Vitals:   T(C): 36.7 (12-06-24 @ 05:11), Max: 36.7 (12-05-24 @ 11:01)  HR: 57 (12-06-24 @ 05:11) (57 - 75)  BP: 121/72 (12-06-24 @ 05:11) (109/64 - 130/72)  RR: 18 (12-06-24 @ 05:11) (18 - 18)  SpO2: 97% (12-06-24 @ 05:11) (95% - 97%)    Physical Examination:  General: no acute distress  HEENT: NC/AT, EOMI,  Cardio: S1, S2 heard, RRR, no murmurs  Resp: breath sounds heard bilaterally, no rales, wheezes or rhonchi  Abd: soft, NT, ND  Ext: no edema or cyanosis  Skin: warm, dry, no visible rash      Laboratory Studies:  CBC:                       13.6   12.64 )-----------( 279      ( 06 Dec 2024 08:48 )             41.0     CMP:             Microbiology: reviewed    Culture - Urine (collected 12-01-24 @ 03:25)  Source: Clean Catch  Final Report (12-02-24 @ 11:56):    <10,000 CFU/mL Normal Urogenital Monique    Urinalysis with Rflx Culture (collected 12-01-24 @ 03:25)          Radiology: reviewed

## 2024-12-06 NOTE — DISCHARGE NOTE NURSING/CASE MANAGEMENT/SOCIAL WORK - PATIENT PORTAL LINK FT
You can access the FollowMyHealth Patient Portal offered by [facility] by registering at the following website: [email or web address]. By joining [facility]’s FollowMyHealth portal, you will also be able to view your health information using other applications (apps) compatible with our system.

## 2024-12-06 NOTE — DISCHARGE NOTE PROVIDER - PROVIDER TOKENS
FREE:[LAST:[Thelma Sanches],FIRST:[Isela],PHONE:[phone]],FAX:[(   )    -],ADDRESS:[[address] [place] [id number]]]

## 2024-12-06 NOTE — DISCHARGE NOTE PROVIDER - HOSPITAL COURSE
65y/o female presents to ED with Left lower back pain. Pt reports pain shoots into leg. Began Wednesday and has been worsening since. Reports significant difficulty with ambulating and pain worse with movement. Denies trauma, fever, abdominal pain, nausea, emesis, dysuria.    Back pain  - pain control as per pain management   - still with severe pain  this morning   - changed steroids to PO  - cw baclofen  - cw lidocaine patch   - neuro fu   - ortho spine and pain management fu appreciated     Hypothyroid  - cw synthroid    UTI  - cw abx , last day today    Nonobstructive renal stone  - outpt fu   - cw abx for uti

## 2024-12-06 NOTE — DISCHARGE NOTE NURSING/CASE MANAGEMENT/SOCIAL WORK - NSDCPEFALRISK_GEN_ALL_CORE
For information on Fall & Injury Prevention, visit: [email or web address] OR  [email or web address] OR  https://www.cdc.gov/steadi/patient.html

## 2024-12-06 NOTE — CASE MANAGEMENT PROGRESS NOTE - NSCMPROGRESSNOTE_GEN_ALL_CORE
Per MD, patient is medically cleared for discharge home today.  CM met with patient at bedside to discussed discharge disposition is home with outpatient PT (script provided.  Patient states she would like to go to [facility] (known to her).  to transport patient home. Patient verbalized understanding of the transition plan and is in agreement. CM answered all questions to the best of my abilities. CM remains available throughout hospital stay.

## 2024-12-06 NOTE — DISCHARGE NOTE PROVIDER - NSDCCPCAREPLAN_GEN_ALL_CORE_FT
PRINCIPAL DISCHARGE DIAGNOSIS  Diagnosis: Back pain  Assessment and Plan of Treatment: You were admitted with back pain. You were given steroids and pain medication. Your symptoms have improved. Please complete Prednisone 20 mg oral for one additional day 12/7. Follow with your primary medical doctor and pain management doctor. Continue with Oxycodone as needed every 8 hours, prescription for 3 days sent to your pharmacy.      SECONDARY DISCHARGE DIAGNOSES  Diagnosis: Hypothyroidism  Assessment and Plan of Treatment: Continue Levothyroxine.

## 2024-12-07 RX ORDER — PREDNISONE 20 MG/1
1 TABLET ORAL
Qty: 1 | Refills: 0
Start: 2024-12-07 | End: 2024-12-07

## 2024-12-07 RX ORDER — PREDNISONE 20 MG/1
1 TABLET ORAL
Qty: 2 | Refills: 0
Start: 2024-12-07 | End: 2024-12-08

## 2024-12-09 ENCOUNTER — APPOINTMENT (OUTPATIENT)
Dept: PAIN MANAGEMENT | Facility: CLINIC | Age: 64
End: 2024-12-09
Payer: COMMERCIAL

## 2024-12-09 VITALS — WEIGHT: 160 LBS | HEIGHT: 63 IN | BODY MASS INDEX: 28.35 KG/M2

## 2024-12-09 DIAGNOSIS — M79.18 MYALGIA, OTHER SITE: ICD-10-CM

## 2024-12-09 DIAGNOSIS — G89.4 CHRONIC PAIN SYNDROME: ICD-10-CM

## 2024-12-09 PROCEDURE — 96372 THER/PROPH/DIAG INJ SC/IM: CPT

## 2024-12-09 PROCEDURE — J3490M: CUSTOM

## 2024-12-09 PROCEDURE — 99214 OFFICE O/P EST MOD 30 MIN: CPT | Mod: 25

## 2024-12-09 PROCEDURE — 20552 NJX 1/MLT TRIGGER POINT 1/2: CPT

## 2024-12-11 ENCOUNTER — APPOINTMENT (OUTPATIENT)
Dept: PAIN MANAGEMENT | Facility: CLINIC | Age: 64
End: 2024-12-11
Payer: COMMERCIAL

## 2024-12-11 PROCEDURE — 64483 NJX AA&/STRD TFRM EPI L/S 1: CPT | Mod: LT

## 2024-12-11 PROCEDURE — 64484 NJX AA&/STRD TFRM EPI L/S EA: CPT | Mod: 59,LT

## 2024-12-18 ENCOUNTER — APPOINTMENT (OUTPATIENT)
Dept: ORTHOPEDIC SURGERY | Facility: CLINIC | Age: 64
End: 2024-12-18
Payer: COMMERCIAL

## 2024-12-18 VITALS — HEIGHT: 63 IN | WEIGHT: 160 LBS | BODY MASS INDEX: 28.35 KG/M2

## 2024-12-18 DIAGNOSIS — M54.16 RADICULOPATHY, LUMBAR REGION: ICD-10-CM

## 2024-12-18 DIAGNOSIS — M51.9 UNSPECIFIED THORACIC, THORACOLUMBAR AND LUMBOSACRAL INTERVERTEBRAL DISC DISORDER: ICD-10-CM

## 2024-12-18 DIAGNOSIS — M47.816 SPONDYLOSIS W/OUT MYELOPATHY OR RADICULOPATHY, LUMBAR REGION: ICD-10-CM

## 2024-12-18 PROCEDURE — 72170 X-RAY EXAM OF PELVIS: CPT

## 2024-12-18 PROCEDURE — 99215 OFFICE O/P EST HI 40 MIN: CPT

## 2024-12-18 PROCEDURE — 72110 X-RAY EXAM L-2 SPINE 4/>VWS: CPT

## 2024-12-18 RX ORDER — OXYCODONE AND ACETAMINOPHEN 7.5; 325 MG/1; MG/1
7.5-325 TABLET ORAL EVERY 8 HOURS
Qty: 42 | Refills: 0 | Status: ACTIVE | COMMUNITY
Start: 2024-12-09 | End: 1900-01-01
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
## 2025-01-08 ENCOUNTER — APPOINTMENT (OUTPATIENT)
Dept: PAIN MANAGEMENT | Facility: CLINIC | Age: 65
End: 2025-01-08
Payer: COMMERCIAL

## 2025-01-08 VITALS — BODY MASS INDEX: 28.35 KG/M2 | HEIGHT: 63 IN | WEIGHT: 160 LBS

## 2025-01-08 PROCEDURE — 99214 OFFICE O/P EST MOD 30 MIN: CPT

## 2025-01-08 RX ORDER — GABAPENTIN 300 MG/1
300 CAPSULE ORAL TWICE DAILY
Qty: 60 | Refills: 0 | Status: ACTIVE | COMMUNITY
Start: 2025-01-08 | End: 1900-01-01

## 2025-01-15 ENCOUNTER — APPOINTMENT (OUTPATIENT)
Dept: ORTHOPEDIC SURGERY | Facility: CLINIC | Age: 65
End: 2025-01-15
Payer: COMMERCIAL

## 2025-01-15 VITALS — BODY MASS INDEX: 28.35 KG/M2 | WEIGHT: 160 LBS | HEIGHT: 63 IN

## 2025-01-15 DIAGNOSIS — M54.50 LOW BACK PAIN, UNSPECIFIED: ICD-10-CM

## 2025-01-15 DIAGNOSIS — M54.16 RADICULOPATHY, LUMBAR REGION: ICD-10-CM

## 2025-01-15 DIAGNOSIS — M51.9 UNSPECIFIED THORACIC, THORACOLUMBAR AND LUMBOSACRAL INTERVERTEBRAL DISC DISORDER: ICD-10-CM

## 2025-01-15 PROCEDURE — 99214 OFFICE O/P EST MOD 30 MIN: CPT

## 2025-01-29 ENCOUNTER — APPOINTMENT (OUTPATIENT)
Dept: PAIN MANAGEMENT | Facility: CLINIC | Age: 65
End: 2025-01-29
Payer: COMMERCIAL

## 2025-01-29 DIAGNOSIS — M54.16 RADICULOPATHY, LUMBAR REGION: ICD-10-CM

## 2025-01-29 PROCEDURE — 64483 NJX AA&/STRD TFRM EPI L/S 1: CPT | Mod: LT

## 2025-01-29 PROCEDURE — 64484 NJX AA&/STRD TFRM EPI L/S EA: CPT | Mod: 59,LT

## 2025-02-12 ENCOUNTER — APPOINTMENT (OUTPATIENT)
Dept: PAIN MANAGEMENT | Facility: CLINIC | Age: 65
End: 2025-02-12
Payer: COMMERCIAL

## 2025-02-12 VITALS — WEIGHT: 160 LBS | HEIGHT: 63 IN | BODY MASS INDEX: 28.35 KG/M2

## 2025-02-12 DIAGNOSIS — M54.16 RADICULOPATHY, LUMBAR REGION: ICD-10-CM

## 2025-02-12 PROCEDURE — 99213 OFFICE O/P EST LOW 20 MIN: CPT

## 2025-02-26 ENCOUNTER — APPOINTMENT (OUTPATIENT)
Dept: ORTHOPEDIC SURGERY | Facility: CLINIC | Age: 65
End: 2025-02-26
Payer: COMMERCIAL

## 2025-02-26 DIAGNOSIS — M54.16 RADICULOPATHY, LUMBAR REGION: ICD-10-CM

## 2025-02-26 DIAGNOSIS — M51.9 UNSPECIFIED THORACIC, THORACOLUMBAR AND LUMBOSACRAL INTERVERTEBRAL DISC DISORDER: ICD-10-CM

## 2025-02-26 DIAGNOSIS — M47.816 SPONDYLOSIS W/OUT MYELOPATHY OR RADICULOPATHY, LUMBAR REGION: ICD-10-CM

## 2025-02-26 PROCEDURE — 99214 OFFICE O/P EST MOD 30 MIN: CPT

## 2025-04-16 ENCOUNTER — APPOINTMENT (OUTPATIENT)
Dept: ORTHOPEDIC SURGERY | Facility: CLINIC | Age: 65
End: 2025-04-16
Payer: COMMERCIAL

## 2025-04-16 VITALS — BODY MASS INDEX: 28.35 KG/M2 | HEIGHT: 63 IN | WEIGHT: 160 LBS

## 2025-04-16 DIAGNOSIS — M25.511 PAIN IN RIGHT SHOULDER: ICD-10-CM

## 2025-04-16 DIAGNOSIS — M75.21 BICIPITAL TENDINITIS, RIGHT SHOULDER: ICD-10-CM

## 2025-04-16 DIAGNOSIS — M75.41 IMPINGEMENT SYNDROME OF RIGHT SHOULDER: ICD-10-CM

## 2025-04-16 PROCEDURE — 20611 DRAIN/INJ JOINT/BURSA W/US: CPT | Mod: RT

## 2025-04-16 PROCEDURE — 99214 OFFICE O/P EST MOD 30 MIN: CPT | Mod: 25

## 2025-04-16 RX ORDER — METHYLPREDNISOLONE 4 MG/1
4 TABLET ORAL
Qty: 1 | Refills: 0 | Status: ACTIVE | COMMUNITY
Start: 2025-04-16 | End: 1900-01-01

## 2025-06-18 ENCOUNTER — APPOINTMENT (OUTPATIENT)
Dept: ORTHOPEDIC SURGERY | Facility: CLINIC | Age: 65
End: 2025-06-18
Payer: COMMERCIAL

## 2025-06-18 VITALS — BODY MASS INDEX: 28.35 KG/M2 | WEIGHT: 160 LBS | HEIGHT: 63 IN

## 2025-06-18 PROCEDURE — 99214 OFFICE O/P EST MOD 30 MIN: CPT

## 2025-09-17 ENCOUNTER — APPOINTMENT (OUTPATIENT)
Dept: ORTHOPEDIC SURGERY | Facility: CLINIC | Age: 65
End: 2025-09-17
Payer: COMMERCIAL

## 2025-09-17 DIAGNOSIS — S43.51XD SPRAIN OF RIGHT ACROMIOCLAVICULAR JOINT, SUBSEQUENT ENCOUNTER: ICD-10-CM

## 2025-09-17 DIAGNOSIS — M75.41 IMPINGEMENT SYNDROME OF RIGHT SHOULDER: ICD-10-CM

## 2025-09-17 DIAGNOSIS — M75.21 BICIPITAL TENDINITIS, RIGHT SHOULDER: ICD-10-CM

## 2025-09-17 DIAGNOSIS — M19.011 PRIMARY OSTEOARTHRITIS, RIGHT SHOULDER: ICD-10-CM

## 2025-09-17 PROCEDURE — 99214 OFFICE O/P EST MOD 30 MIN: CPT

## 2025-09-17 RX ORDER — GABAPENTIN 300 MG/1
300 CAPSULE ORAL AT BEDTIME
Qty: 90 | Refills: 0 | Status: ACTIVE | COMMUNITY
Start: 2025-09-17 | End: 1900-01-01

## (undated) DEVICE — BUR S&N STONECUTTER ELITE 4MM STRAIGHT (MAROON)

## (undated) DEVICE — SUT MONOCRYL 3-0 18" PS-2 UNDYED

## (undated) DEVICE — SYR LUER LOK 50CC

## (undated) DEVICE — WARMING BLANKET LOWER ADULT

## (undated) DEVICE — CANNULA LINVATEC SHOULDER 7CM (GREY & ORANGE)

## (undated) DEVICE — SOL IRR BAG NS 0.9% 3000ML

## (undated) DEVICE — SUT PROLENE 0 30" CT-1

## (undated) DEVICE — TUBING DEPUY MITEK FMS INFLOW

## (undated) DEVICE — NDL SPINAL 18G X 3.5" (PINK)

## (undated) DEVICE — SUT ORTHOCORD 2 36"

## (undated) DEVICE — POSITIONER PATIENT SAFETY STRAP 3X60"

## (undated) DEVICE — DRSG STERISTRIPS 0.5 X 4"

## (undated) DEVICE — SUT ORTHOCORD COMPOSITE

## (undated) DEVICE — SHAVER BLADE S&N FULL RADIUS BONECUTTER PLATINUM 4.5MM (YELLOW)

## (undated) DEVICE — ELCTR S&N SWITCHPEN WITH L-HOOK FOR FLUID

## (undated) DEVICE — VENODYNE/SCD SLEEVE CALF MEDIUM

## (undated) DEVICE — GLV 7.5 PROTEXIS (WHITE)

## (undated) DEVICE — SUT PDS II 1 96" XLH

## (undated) DEVICE — GLV 8 PROTEXIS (BLUE)

## (undated) DEVICE — DRAPE MAYO STAND 23"

## (undated) DEVICE — DRSG STOCKINETTE TUBULAR COTTON 3" NS

## (undated) DEVICE — DRSG TAPE MICROFOAM 4"

## (undated) DEVICE — DRSG CURITY GAUZE SPONGE 4 X 4" 12-PLY

## (undated) DEVICE — TUBING DEPUY MITEK FMS OUTFLOW

## (undated) DEVICE — PACK SHOULDER

## (undated) DEVICE — KNIFE S&N ACUFEX BANANA SERRATED 3MM STRAIGHT

## (undated) DEVICE — ELCTR GROUNDING PAD ADULT COVIDIEN

## (undated) DEVICE — CANNULA S&N ARTHROSCOPY W OBTURATOR 8MM

## (undated) DEVICE — POSITIONER S&N FACE MASK SPIDER 2

## (undated) DEVICE — ELCTR WAND AMBIENT MEGA 90DRG